# Patient Record
Sex: FEMALE | Race: WHITE | NOT HISPANIC OR LATINO | Employment: OTHER | URBAN - METROPOLITAN AREA
[De-identification: names, ages, dates, MRNs, and addresses within clinical notes are randomized per-mention and may not be internally consistent; named-entity substitution may affect disease eponyms.]

---

## 2017-05-18 ENCOUNTER — OFFICE VISIT (OUTPATIENT)
Dept: MEDICAL GROUP | Facility: MEDICAL CENTER | Age: 52
End: 2017-05-18
Payer: COMMERCIAL

## 2017-05-18 VITALS
WEIGHT: 120 LBS | DIASTOLIC BLOOD PRESSURE: 62 MMHG | TEMPERATURE: 98.4 F | OXYGEN SATURATION: 98 % | SYSTOLIC BLOOD PRESSURE: 110 MMHG | HEIGHT: 62 IN | BODY MASS INDEX: 22.08 KG/M2 | HEART RATE: 68 BPM

## 2017-05-18 DIAGNOSIS — Z13.220 LIPID SCREENING: ICD-10-CM

## 2017-05-18 DIAGNOSIS — Z78.0 POSTMENOPAUSAL STATUS: ICD-10-CM

## 2017-05-18 DIAGNOSIS — N32.81 OVERACTIVE BLADDER: ICD-10-CM

## 2017-05-18 DIAGNOSIS — F43.21 GRIEF: ICD-10-CM

## 2017-05-18 DIAGNOSIS — K51.80 OTHER ULCERATIVE COLITIS WITHOUT COMPLICATION (HCC): ICD-10-CM

## 2017-05-18 DIAGNOSIS — E03.9 ACQUIRED HYPOTHYROIDISM: ICD-10-CM

## 2017-05-18 PROBLEM — K51.90 ULCERATIVE COLITIS WITHOUT COMPLICATIONS (HCC): Status: ACTIVE | Noted: 2017-05-18

## 2017-05-18 PROCEDURE — 99204 OFFICE O/P NEW MOD 45 MIN: CPT | Performed by: NURSE PRACTITIONER

## 2017-05-18 RX ORDER — DOCUSATE SODIUM 250 MG
250 CAPSULE ORAL DAILY
COMMUNITY
End: 2019-07-10

## 2017-05-18 RX ORDER — AZATHIOPRINE 50 MG/1
50-100 TABLET ORAL 2 TIMES DAILY
COMMUNITY

## 2017-05-18 RX ORDER — OXYBUTYNIN CHLORIDE 5 MG/1
5 TABLET ORAL 3 TIMES DAILY
COMMUNITY
End: 2017-09-12 | Stop reason: SDUPTHER

## 2017-05-18 RX ORDER — OXYBUTYNIN CHLORIDE 5 MG/1
5 TABLET ORAL 3 TIMES DAILY
Qty: 90 TAB | Refills: 1 | Status: SHIPPED | OUTPATIENT
Start: 2017-05-18 | End: 2018-05-15 | Stop reason: SDUPTHER

## 2017-05-18 RX ORDER — DOCUSATE SODIUM 250 MG
250 CAPSULE ORAL DAILY
Qty: 30 CAP | Refills: 2 | Status: SHIPPED | OUTPATIENT
Start: 2017-05-18 | End: 2019-07-10

## 2017-05-18 RX ORDER — LEVOTHYROXINE SODIUM 88 UG/1
88 TABLET ORAL
COMMUNITY
End: 2017-09-12 | Stop reason: SDUPTHER

## 2017-05-18 ASSESSMENT — PATIENT HEALTH QUESTIONNAIRE - PHQ9: CLINICAL INTERPRETATION OF PHQ2 SCORE: 0

## 2017-05-18 NOTE — MR AVS SNAPSHOT
"        Mira Ramirez   2017 1:00 PM   Office Visit   MRN: [id number]    Department:  [facility]   Dept Phone:  [phone]    Description:  Female : 1965   Provider:  VANNA Mcintosh           Reason for Visit     Establish Care           Allergies as of 2017     Allergen Noted Reactions    Mesalamine 2017   Shortness of Breath    Sulfa Drugs 2017   Shortness of Breath    Sulfasalazine 2017   Shortness of Breath      You were diagnosed with     Grief   [626992]       Acquired hypothyroidism   [5105499]       Other ulcerative colitis without complication   [1842270]       Overactive bladder   [029336]       Postmenopausal status   [993420]       Lipid screening   [327509]         Vital Signs     Blood Pressure Pulse Temperature Height Weight Body Mass Index    110/62 mmHg 68 36.9 °C (98.4 °F) 1.575 m (5' 2\") 54.432 kg (120 lb) 21.94 kg/m2    Oxygen Saturation Smoking Status                98% Never Smoker           Basic Information     Date Of Birth Sex Race Ethnicity Preferred Language    1965 Female White Non- English      Problem List              ICD-10-CM Priority Class Noted - Resolved    Grief F43.20   2017 - Present    Acquired hypothyroidism E03.9   2017 - Present    Ulcerative colitis without complications (CMS-[facility]) K51.90   2017 - Present    Postmenopausal status Z78.0   2017 - Present    Overactive bladder N32.81   2017 - Present      Health Maintenance     Patient has no pending health maintenance at this time      Current Immunizations     No immunizations on file.      Below and/or attached are the medications your provider expects you to take. Review all of your home medications and newly ordered medications with your provider and/or pharmacist. Follow medication instructions as directed by your provider and/or pharmacist. Please keep your medication list with you and share with your provider. Update the " information when medications are discontinued, doses are changed, or new medications (including over-the-counter products) are added; and carry medication information at all times in the event of emergency situations     Allergies:  MESALAMINE - Shortness of Breath     SULFA DRUGS - Shortness of Breath     SULFASALAZINE - Shortness of Breath               Medications  Valid as of: May 18, 2017 -  3:02 PM    Generic Name Brand Name Tablet Size Instructions for use    AzaTHIOprine (Tab) IMURAN 50 MG Take 50 mg by mouth every day.        Calcium Citrate-Vitamin D (Tab) CITRACAL+D 315-200 MG-UNIT Take 1 Tab by mouth every day.        Docusate Sodium (Cap) COLACE 250 MG Take 250 mg by mouth every day.        Docusate Sodium (Cap) COLACE 250 MG Take 1 Cap by mouth every day.        Levothyroxine Sodium (Tab) SYNTHROID 88 MCG Take 88 mcg by mouth Every morning on an empty stomach.        Oxybutynin Chloride (Tab) DITROPAN 5 MG Take 5 mg by mouth 3 times a day.        Oxybutynin Chloride (Tab) DITROPAN 5 MG Take 1 Tab by mouth 3 times a day.        Pediatric Multivitamins-Iron (Solution) VI-CINDY/FE 10 MG/ML Take 1 mL by mouth every day.        .                 Medicines prescribed today were sent to:     [id number] - [place], [place] - [address] [address] [address]    Phone: [phone] Fax: [phone]    Open 24 Hours?: No      Medication refill instructions:       If your prescription bottle indicates you have medication refills left, it is not necessary to call your provider’s office. Please contact your pharmacy and they will refill your medication.    If your prescription bottle indicates you do not have any refills left, you may request refills at any time through one of the following ways: The online [employer] system (except Urgent Care), by calling your provider’s office, or by asking your pharmacy to contact your provider’s office with a refill request. Medication refills are  processed only during regular business hours and may not be available until the next business day. Your provider may request additional information or to have a follow-up visit with you prior to refilling your medication.   *Please Note: Medication refills are assigned a new Rx number when refilled electronically. Your pharmacy may indicate that no refills were authorized even though a new prescription for the same medication is available at the pharmacy. Please request the medicine by name with the pharmacy before contacting your provider for a refill.        Your To Do List     Future Labs/Procedures Complete By Expires    CBC WITH DIFFERENTIAL  As directed 5/19/2018    COMP METABOLIC PANEL  As directed 5/19/2018    FREE THYROXINE  As directed 5/19/2018    LIPID PROFILE  As directed 5/19/2018    TSH  As directed 5/19/2018      Referral     A referral request has been sent to our patient care coordination department. Please allow 3-5 business days for us to process this request and contact you either by phone or mail. If you do not hear from us by the 5th business day, please call us at [phone].           [employer] Access Code: [email or web address]  Expires: 6/17/2017 12:35 PM    [employer]  A secure, online tool to manage your health information     [employer]’s [employer]® is a secure, online tool that connects you to your personalized health information from the privacy of your home -- day or night - making it very easy for you to manage your healthcare. Once the activation process is completed, you can even access your medical information using the [employer] luiz, which is available for free in the [employer] Luiz store or Google Play store.     [employer] provides the following levels of access (as shown below):   My Chart Features   [facility] Doctor [facility]  Specialists [facility]  Urgent  Care Non-[facility]  Primary Care  Doctor   Email your healthcare team securely and privately 24/7 X X X    Manage appointments:  schedule your next appointment; view details of past/upcoming appointments X      Request prescription refills. X      View recent personal medical records, including lab and immunizations X X X X   View health record, including health history, allergies, medications X X X X   Read reports about your outpatient visits, procedures, consult and ER notes X X X X   See your discharge summary, which is a recap of your hospital and/or ER visit that includes your diagnosis, lab results, and care plan. X X       How to register for [employer]:  1. Go to  [email or web address].  2. Click on the Sign Up Now box, which takes you to the New Member Sign Up page. You will need to provide the following information:  a. Enter your [employer] Access Code exactly as it appears at the top of this page. (You will not need to use this code after you’ve completed the sign-up process. If you do not sign up before the expiration date, you must request a new code.)   b. Enter your date of birth.   c. Enter your home email address.   d. Click Submit, and follow the next screen’s instructions.  3. Create a [employer] ID. This will be your [employer] login ID and cannot be changed, so think of one that is secure and easy to remember.  4. Create a [employer] password. You can change your password at any time.  5. Enter your Password Reset Question and Answer. This can be used at a later time if you forget your password.   6. Enter your e-mail address. This allows you to receive e-mail notifications when new information is available in [employer].  7. Click Sign Up. You can now view your health information.    For assistance activating your [employer] account, call [phone]

## 2017-05-18 NOTE — ASSESSMENT & PLAN NOTE
passed from [place] about a year ago  Interested in counseling and requesting referral  Denies depression

## 2017-05-18 NOTE — Clinical Note
[facility]  SAMEERA Mcintosh.  [address]  [place] [address]  Fax: [phone]   Authorization for Release/Disclosure of   Protected Health Information   Name: MIRA MULLINS : 1965 SSN: [id number]   Address:  [address] [place] [address] Phone:    [phone] (home)    I authorize the entity listed below to release/disclose the PHI below to:   [facility]/VANNA Mcintosh and VANNA Mcintosh   Provider or Entity Name:  Mae   Address   City, [facility] [place]   Phone:      Fax:     Reason for request: continuity of care   Information to be released:    [ x ] LAST COLONOSCOPY,  including any PATH REPORT and follow-up  [  ] LAST FIT/COLOGUARD RESULT [  ] LAST DEXA  [ x ] LAST MAMMOGRAM  [  ] LAST PAP  [  ] LAST LABS [  ] RETINA EXAM REPORT  [  ] IMMUNIZATION RECORDS  [ x ] Release all info      [  ] Check here and initial the line next to each item to release ALL health information INCLUDING  _____ Care and treatment for drug and / or alcohol abuse  _____ HIV testing, infection status, or AIDS  _____ Genetic Testing    DATES OF SERVICE OR TIME PERIOD TO BE DISCLOSED: _____________  I understand and acknowledge that:  * This Authorization may be revoked at any time by you in writing, except if your health information has already been used or disclosed.  * Your health information that will be used or disclosed as a result of you signing this authorization could be re-disclosed by the recipient. If this occurs, your re-disclosed health information may no longer be protected by State or Federal laws.  * You may refuse to sign this Authorization. Your refusal will not affect your ability to obtain treatment.  * This Authorization becomes effective upon signing and will  on (date) __________.      If no date is indicated, this Authorization will  one (1) year from the signature date.    Name: Mira Mullins    Signature:   Date:          5/18/2017       PLEASE FAX REQUESTED RECORDS BACK TO: [phone]

## 2017-05-18 NOTE — Clinical Note
5/18/2017    Subject: Action Required to Complete [employer] Activation    Dear Mira Ramirez,    Thank you for enrolling in [employer], a free online tool where you can schedule appointments, request prescription refills, view test results and more. To complete your [employer] activation, please follow the instructions below.     1. Visit [employer]     2. Click “Sign Up Now”    3. Enter activation code: [email or web address]  Expires: 6/17/2017 12:35 PM    4. Follow the instructions on screen to complete the quick, 3-step activation    Once you’ve completed your activation, you’re ready to view your medical record. Please remember, [employer] is not to be used for urgent needs. For medical emergencies, dial 911.    [employer]’s secure online tool allows you to manage your health information day or night. [employer] allows you to:    • Schedule and view appointments  • View test results  • Request prescription refills  • Message your healthcare provider  • Keep track of your family’s health  • Review immunization records  • View or download your Summary of Care document    Download the [employer] Luiz   After you’ve created a login by following the steps above, you can download the [employer] luiz for your smartphone for even quicker access. Simply visit the luiz store and search “Finding [employer].”    For questions or assistance with [employer], please call Customer Service at [phone].    Sincerely,  Customer Service Team

## 2017-05-18 NOTE — PROGRESS NOTES
Chief Complaint   Patient presents with   • Establish Care     Mira Ramirez is a 51 y.o. female here to [facility]. She has recently relocated from [facility]. She has a daughter in college and son in high school, she recently lost her . She is working at [employer]. We discussed:    Grief   passed from [place] about a year ago  Interested in counseling and requesting referral  Denies depression    Acquired hypothyroidism  Managed with levothyroxine 88 mcg daily  Weight stable, no fatigue, no constipation    Ulcerative colitis without complications (CMS-HCC)  Doing well with imuran  Needs local gastroenterologist  No recent abd pain, no blood in stool    Postmenopausal status  On CA and Vi D supplementation  Unsure if she has had bone density    Overactive bladder  Controlled with oxybutynin    Last mammogram was November 2016. States that she is up-to-date on Pap smear as well  Current medicines (including changes today)  Current Outpatient Prescriptions   Medication Sig Dispense Refill   • poly vits with iron (VI-CINDY/FE) 10 MG/ML Solution Take 1 mL by mouth every day.     • calcium citrate 315 mg-vitamin D 200 mg (CALCIUM CITRATE +) 315-200 MG-UNIT per tablet Take 1 Tab by mouth every day.     • docusate sodium (COLACE) 250 MG capsule Take 250 mg by mouth every day.     • azathioprine (IMURAN) 50 MG Tab Take 50 mg by mouth every day.     • levothyroxine (SYNTHROID) 88 MCG Tab Take 88 mcg by mouth Every morning on an empty stomach.     • oxybutynin (DITROPAN) 5 MG Tab Take 5 mg by mouth 3 times a day.     • oxybutynin (DITROPAN) 5 MG Tab Take 1 Tab by mouth 3 times a day. 90 Tab 1   • docusate sodium (RA COL-RITE) 250 MG capsule Take 1 Cap by mouth every day. 30 Cap 2     No current facility-administered medications for this visit.     She  has no past medical history on file.  She  has past surgical history that includes eye surgery.  Social History   Substance Use Topics   • Smoking  "status: Not on file   • Smokeless tobacco: Not on file   • Alcohol Use: Not on file     Social History     Social History Narrative   • No narrative on file     No family history on file.  No family status information on file.         ROS  Problems listed discussed above, all other systems reviewed and negative     Objective:     Blood pressure 110/62, pulse 68, temperature 36.9 °C (98.4 °F), height 1.575 m (5' 2\"), weight 54.432 kg (120 lb), SpO2 98 %. Body mass index is 21.94 kg/(m^2).  Physical Exam:  General: Alert, oriented in no acute distress.  Eye contact is good, speech is normal, affect calm  HEENT: Right ptosis, perrl, Oral mucosa pink moist, no lesions. Nares patent. TMs gray with good landmarks bilaterally. No cervical or supraclavicular lymphadenopathy, no thyromegaly  Lungs: clear to auscultation bilaterally, good aeration, normal effort. No wheeze/ rhonchi/ rales.  CV: regular rate and rhythm, S1, S2. No murmur, no edema. Pedal pulses 2 + bilaterally  Abdomen: soft, nontender, BS x4, no hepatosplenomegaly.  Ext: color normal, vascularity normal, temperature normal. No rash or lesions.  MS: no point tenderness over spine, no obvious deformity. No joint swelling or redness. Strength is 5/5 globally  Neuro: DTR 2+ bilaterally   Assessment and Plan:   The following treatment plan was discussed   1. Grief  Grieving from loss of , would like counseling. Denies depression  REFERRAL TO PSYCHOLOGY   2. Acquired hypothyroidism  Clinically euthyroid. Recheck labs  COMP METABOLIC PANEL    TSH    FREE THYROXINE   3. Other ulcerative colitis without complication  Stable, refer to local specialist  REFERRAL TO GASTROENTEROLOGY    CBC WITH DIFFERENTIAL    docusate sodium (RA COL-RITE) 250 MG capsule   4. Overactive bladder  Doing well with ditropan, refill provided  oxybutynin (DITROPAN) 5 MG Tab   5. Postmenopausal status  continue calcium and vitamin D supplementation. Will review records for DEXA scan  "   6. Lipid screening  LIPID PROFILE       Educated in proper administration of medication(s) ordered today including safety, possible SE, risks, benefits, rationale and alternatives to therapy.       Records requested, will determine next due date for mammogram  Followup: Pending labs             Please note that this dictation was created using voice recognition software. I have worked with consultants from the vendor as well as technical experts from [facility] to optimize the interface. I have made every reasonable attempt to correct obvious errors, but I expect that there are errors of grammar and possibly content that I did not discover before finalizing the note.

## 2017-05-23 ENCOUNTER — HOSPITAL ENCOUNTER (OUTPATIENT)
Dept: LAB | Facility: MEDICAL CENTER | Age: 52
End: 2017-05-23
Attending: NURSE PRACTITIONER
Payer: COMMERCIAL

## 2017-05-23 ENCOUNTER — APPOINTMENT (OUTPATIENT)
Dept: BEHAVIORAL HEALTH | Facility: PHYSICIAN GROUP | Age: 52
End: 2017-05-23
Payer: COMMERCIAL

## 2017-05-23 ENCOUNTER — OFFICE VISIT (OUTPATIENT)
Dept: BEHAVIORAL HEALTH | Facility: PHYSICIAN GROUP | Age: 52
End: 2017-05-23
Payer: COMMERCIAL

## 2017-05-23 DIAGNOSIS — F43.23 ADJUSTMENT DISORDER WITH MIXED ANXIETY AND DEPRESSED MOOD: ICD-10-CM

## 2017-05-23 DIAGNOSIS — Z13.220 LIPID SCREENING: ICD-10-CM

## 2017-05-23 DIAGNOSIS — K51.80 OTHER ULCERATIVE COLITIS WITHOUT COMPLICATION (HCC): ICD-10-CM

## 2017-05-23 DIAGNOSIS — E03.9 ACQUIRED HYPOTHYROIDISM: ICD-10-CM

## 2017-05-23 LAB
ALBUMIN SERPL BCP-MCNC: 4.3 G/DL (ref 3.2–4.9)
ALBUMIN/GLOB SERPL: 1.3 G/DL
ALP SERPL-CCNC: 58 U/L (ref 30–99)
ALT SERPL-CCNC: 8 U/L (ref 2–50)
ANION GAP SERPL CALC-SCNC: 7 MMOL/L (ref 0–11.9)
AST SERPL-CCNC: 18 U/L (ref 12–45)
BASOPHILS # BLD AUTO: 1.1 % (ref 0–1.8)
BASOPHILS # BLD: 0.06 K/UL (ref 0–0.12)
BILIRUB SERPL-MCNC: 1.9 MG/DL (ref 0.1–1.5)
BUN SERPL-MCNC: 19 MG/DL (ref 8–22)
CALCIUM SERPL-MCNC: 10 MG/DL (ref 8.5–10.5)
CHLORIDE SERPL-SCNC: 104 MMOL/L (ref 96–112)
CHOLEST SERPL-MCNC: 201 MG/DL (ref 100–199)
CO2 SERPL-SCNC: 27 MMOL/L (ref 20–33)
CREAT SERPL-MCNC: 0.57 MG/DL (ref 0.5–1.4)
EOSINOPHIL # BLD AUTO: 0.1 K/UL (ref 0–0.51)
EOSINOPHIL NFR BLD: 1.9 % (ref 0–6.9)
ERYTHROCYTE [DISTWIDTH] IN BLOOD BY AUTOMATED COUNT: 44.5 FL (ref 35.9–50)
GFR SERPL CREATININE-BSD FRML MDRD: >60 ML/MIN/1.73 M 2
GLOBULIN SER CALC-MCNC: 3.2 G/DL (ref 1.9–3.5)
GLUCOSE SERPL-MCNC: 84 MG/DL (ref 65–99)
HCT VFR BLD AUTO: 41.7 % (ref 37–47)
HDLC SERPL-MCNC: 70 MG/DL
HGB BLD-MCNC: 13.4 G/DL (ref 12–16)
IMM GRANULOCYTES # BLD AUTO: 0.01 K/UL (ref 0–0.11)
IMM GRANULOCYTES NFR BLD AUTO: 0.2 % (ref 0–0.9)
LDLC SERPL CALC-MCNC: 116 MG/DL
LYMPHOCYTES # BLD AUTO: 1.68 K/UL (ref 1–4.8)
LYMPHOCYTES NFR BLD: 31.6 % (ref 22–41)
MCH RBC QN AUTO: 30.5 PG (ref 27–33)
MCHC RBC AUTO-ENTMCNC: 32.1 G/DL (ref 33.6–35)
MCV RBC AUTO: 94.8 FL (ref 81.4–97.8)
MONOCYTES # BLD AUTO: 0.48 K/UL (ref 0–0.85)
MONOCYTES NFR BLD AUTO: 9 % (ref 0–13.4)
NEUTROPHILS # BLD AUTO: 2.98 K/UL (ref 2–7.15)
NEUTROPHILS NFR BLD: 56.2 % (ref 44–72)
NRBC # BLD AUTO: 0 K/UL
NRBC BLD AUTO-RTO: 0 /100 WBC
PLATELET # BLD AUTO: 368 K/UL (ref 164–446)
PMV BLD AUTO: 9.5 FL (ref 9–12.9)
POTASSIUM SERPL-SCNC: 4.3 MMOL/L (ref 3.6–5.5)
PROT SERPL-MCNC: 7.5 G/DL (ref 6–8.2)
RBC # BLD AUTO: 4.4 M/UL (ref 4.2–5.4)
SODIUM SERPL-SCNC: 138 MMOL/L (ref 135–145)
T4 FREE SERPL-MCNC: 1.22 NG/DL (ref 0.53–1.43)
TRIGL SERPL-MCNC: 74 MG/DL (ref 0–149)
TSH SERPL DL<=0.005 MIU/L-ACNC: 0.62 UIU/ML (ref 0.3–3.7)
WBC # BLD AUTO: 5.3 K/UL (ref 4.8–10.8)

## 2017-05-23 PROCEDURE — 90791 PSYCH DIAGNOSTIC EVALUATION: CPT | Performed by: MARRIAGE & FAMILY THERAPIST

## 2017-05-23 PROCEDURE — 80053 COMPREHEN METABOLIC PANEL: CPT

## 2017-05-23 PROCEDURE — 80061 LIPID PANEL: CPT

## 2017-05-23 PROCEDURE — 84439 ASSAY OF FREE THYROXINE: CPT

## 2017-05-23 PROCEDURE — 85025 COMPLETE CBC W/AUTO DIFF WBC: CPT

## 2017-05-23 PROCEDURE — 84443 ASSAY THYROID STIM HORMONE: CPT

## 2017-05-23 PROCEDURE — 36415 COLL VENOUS BLD VENIPUNCTURE: CPT

## 2017-05-23 NOTE — BH THERAPY
[facility]  INITIAL ASSESSMENT    Name: Mira Ramirez  MRN: [id number]  : 1965  Age: 51 y.o.  Date of assessment: 2017  PCP: SAMEERA Mcintosh.  Persons in attendance: Patient  Total session time: 45 minutes      CHIEF COMPLAINT AND HISTORY OF PRESENTING PROBLEM:  (as stated by Patient):  Mira Ramirez is a 51 y.o., White female referred for assessment by No ref. provider found.  Primary presenting issue includes   Chief Complaint   Patient presents with   • Anxiety     grief   .    FAMILY/SOCIAL HISTORY  Current living situation/household members: lives w/ 18 yo son Philipp, moved here in Dec 2016 after carlton  2016 of cancer  Relevant family history/structure/dynamics: carlton of 28 yrs was ill for 2 yrs of throat cancer and  in [place], son took care of him; pt says they were a close, loving family; scott, 20, got counseling after his death and it helped but Philipp refused till now and won't come w/out mom; pt moved here for a new start and to get away from memories  Current family/social stressors: grief; also Philipp has been having trouble in school, skipping when he feels like it; went to [employer], the pot thing in [place] w/out telling her; says he has good grades (goes to [employer]) but doesn't seem to care  Quality/quantity of current family and/or social support: her family, her fa is here  Does patient/parent report a family history of behavioral health issues, diagnoses, or treatment? No  No family history on file.     BEHAVIORAL HEALTH TREATMENT HISTORY  Does patient/parent report a history of prior behavioral health treatment for patient? No:    History of untreated behavioral health issues identified? No    MEDICAL HISTORY  Primary care behavioral health screenings: Patient Health Questionaire                                     If depressive symptoms identified deferred to follow up visit unless specifically addressed in assesment and  plan.    Interpretation of PHQ-9 Total Score   Score Severity   1-4 Minimal Depression   5-9 Mild Depression   10-14 Moderate Depression   15-19 Moderately Severe Depression   20-27 Severe Depression     Past medical/surgical history:   No past medical history on file.   Past Surgical History   Procedure Laterality Date   • Eye surgery          Medication Allergies:  Mesalamine; Sulfa drugs; and Sulfasalazine     Patient reports last physical exam: May 2017   Does patient/parent report any history of or current developmental concerns? No  Does patient/parent report nutritional concerns? No  Does patient/parent report change in appetite or weight loss/gain? No  Does patient/parent report history of eating disorder symptoms? No  Does patient/parent report dental problem? No  Does patient/parent report physical pain? No   Indicate if pain is acute or chronic, and location: na   Pain scale rating:       Does patient/parent report functional impact of medical, developmental, or pain issues?   no    EDUCATIONAL  Is patient currently enrolled in a school/educational program?   No:   Highest grade level completed: AA  School performance/functioning: ok  History of Special Education/repeated grades/learning issues: no  Preferred learning style: doing  Current learning needs (large print, language barrier, etc):  no    EMPLOYMENT/RESOURCES  Is the patient currently employed? Yes [employer] business for 29 yrs; [place] [employer]  Does the patient/parent report adequate financial resources? Yes  Does patient identify impact of presenting issue on work functioning? No  Work or income-related stressors:  no     HISTORY:  Does patient report current or past enlistment? No    [If yes, trigger below section]  Does patient report history of exposure to combat? No  Does patient report history of  sexual trauma? No  Does patient report other -related stressors? No    SPIRITUAL/CULTURAL/IDENTITY:  What are the  patient’s/family’s spiritual beliefs or practices? Pentecostalism  What is the patient’s cultural or ethnic background/identity? cauc  How does the patient identify their sexual orientation? het  How does the patient identify their gender? female  Does the patient identify any spiritual/cultural/identity factors as relevant to the presenting issue? No    LEGAL HISTORY  Has the patient ever been involved with juvenile, adult, or family legal systems? No   [If yes, trigger section below:]  Does patient report ever being a victim of a crime?  No  Does patient report involvement in any current legal issues?  No  Does patient report ever being arrested or committing a crime? No  Does patient report any current agency (parole/probation/CPS/) involvement? No    ABUSE/NEGLECT/TRAUMA SCREENING  Does patient report feeling “unsafe” in his/her home, or afraid of anyone? No  Does patient report any history of physical, sexual, or emotional abuse? No  Does parent or significant other report any of the above? na  Is there evidence of neglect by self? No  Is there evidence of neglect by a caregiver? No  Does the patient/parent report any history of CPS/APS/police involvement related to suspected abuse/neglect or domestic violence? No  Does the patient/parent report any other history of potentially traumatic life events? No  Based on the information provided during the current assessment, is a mandated report of suspected abuse/neglect being made?  No     SAFETY ASSESSMENT - SELF  Does patient acknowledge current or past symptoms of dangerousness to self? No  Does parent/significant other report patient has current or past symptoms of dangerousness to self? na      Recent change in frequency/specificity/intensity of suicidal thoughts or self-harm behavior? No  Current access to firearms, medications, or other identified means of suicide/self-harm? na  If yes, willing to restrict access to means of suicide/self-harm?  na  Protective factors present: Fear of suicide, Moral objection to suicide, Spiritual beliefs/practices and Strong family connections    Current Suicide Risk: Low  Crisis Safety Plan completed and copy given to patient: No    SAFETY ASSESSMENT - OTHERS  Does paor past symptoms of aggressive behavior or risk to others? No  Does parent/significant othtient acknowledge current or past symptoms of aggressive behavior or risk to others? No  Does parent/significant other report patient has current or past symptoms of aggressive behavior or risk to others? na    Recent change in frequency/specificity/intensity of thoughts or threats to harm others? No  Current access to firearms/other identified means of harm? No  If yes, willing to restrict access to weapons/means of harm? na  Protective factors present: Moral/spiritual prohibition, Well-developed sense of empathy, Positive impulse-control, Stable relationships and Stable employment    Current Homicide Risk:  Not applicable  Crisis Safety Plan completed and copy given to patient? No  Based on information provided during the current assessment, is a mandated “duty to warn” being exercised? No    SUBSTANCE USE/ADDICTION HISTORY  [] Not applicable - patient 10 years of age or younger    Is there a family history of substance use/addiction? No  Does patient acknowledge or parent/significant other report use of/dependence on substances? No  Last time patient used alcohol: mos  Within the past week? No  Last time patient used marijuana: never  Within the past month? No  Any other street drugs ever tried even once? No  Any use of prescription medications/pills without a prescription, or for reasons others than originally prescribed?  No  Any other addictive behavior reported (gambling, shopping, sex)? No     Drug History:  Amphetamine:      Cannibis:      Cocaine:      Ecstasy:      Hallucinogen:      Inhalant:       Opiate:      Other:      Sedative:           What  "consequences does the patient associate with any of the above substance use and or addictive behaviors? None    Patient’s motivation/readiness for change: here for help w/ son's grief    [] Patient denies use of any substance/addictive behaviors    STRENGTHS/ASSETS  Strengths Identified by interviewer: Stable relationships and Cognitive flexibility  Strengths Identified by patient: willingness    MENTAL STATUS/OBSERVATIONS   Participation: Active verbal participation and Open to feedback  Grooming: Casual and Neat  Orientation:Alert   Behavior: Agitated  Eye contact: Good   Mood:Anxious  Affect:Sad, Anxious and Tearful  Thought process: Logical  Thought content:  Within normal limits  Speech: Pressured and Rapid  Perception: Within normal limits  Memory: No gross evidence of memory deficits  Insight: Good  Judgment:  Good  Other:    Family/couple interaction observations: na    RESULTS OF SCREENING MEASURES:  [] Not applicable  Measure:   Score:     Measure:   Score:       CLINICAL FORMULATION: 50 yo Mira lost her husb last year to cancer, then moved here w/ son to start again; has concerns for him; he took care of his fa the last year; has been having trouble w/ school since the move, even tho he gets good grades; she says she's \"fine\", just mostly concerned about Philipp; scott will be going to college in [place]; will be bringing Philipp to future appts, since she said he will only come w/ her      DIAGNOSTIC IMPRESSION(S):  1. Adjustment disorder with mixed anxiety and depressed mood          IDENTIFIED NEEDS/PLAN:  [If any of these marked, trigger DISPOSITION list]  Mood/anxiety and Other: grief  Actively being addressed by [facility]    Does patient express agreement with the above plan? Yes     Referral appointment(s) scheduled? w/ leonardo therapist for pt and son     End time of session: 1:45    DES Coto.        "

## 2017-05-24 ENCOUNTER — TELEPHONE (OUTPATIENT)
Dept: MEDICAL GROUP | Facility: MEDICAL CENTER | Age: 52
End: 2017-05-24

## 2017-05-24 NOTE — TELEPHONE ENCOUNTER
"----- Message from VANNA Mcintosh sent at 5/24/2017  9:51 AM PDT -----  Please inform patient:  Metabolic panel and blood counts are generally normal. Total cholesterol is 201, normal being under 199. LDL or \"bad cholesterol\" is 116, this is slightly above normal which is less than 100. However, her HDL or \"good cholesterol\" is quite high which balances risk. Thyroid levels in normal range.  Continue with current medications  "

## 2017-06-20 ENCOUNTER — OFFICE VISIT (OUTPATIENT)
Dept: BEHAVIORAL HEALTH | Facility: PHYSICIAN GROUP | Age: 52
End: 2017-06-20
Payer: COMMERCIAL

## 2017-06-20 DIAGNOSIS — F43.23 ADJUSTMENT DISORDER WITH MIXED ANXIETY AND DEPRESSED MOOD: ICD-10-CM

## 2017-06-20 PROCEDURE — 90834 PSYTX W PT 45 MINUTES: CPT | Performed by: MARRIAGE & FAMILY THERAPIST

## 2017-06-20 NOTE — BH THERAPY
[facility]  Therapy Progress Note    Patient Name: Mira Ramirez  Patient MRN: [id number]  Today's Date: 6/20/2017     Type of session: family therapy   Length of session: 45 minutes  Persons in attendance:Patient and Children son Philipp    Subjective/New Info: pt wanted him to talk about his father, but he won't; both complained about fighting and she said he won't do what he's supposed to; he failed 3 classes and now has to go to school longer days, he insists he will graduate, she fears he won't; he got a ticket and she had to do a lot of work to get it handled; she's upset because he won't do chores or tell her his work schedule; he really doesn't see the reason for it; he says they both have hot tempers    Objective/Observations:   Participation: Active verbal participation   Grooming: Casual and Neat   Cognition: Alert   Eye contact: Good   Mood: Anxious and Angry   Affect: Flexible   Thought process: Logical   Speech: Rapid   Other:     Diagnoses:   1. Adjustment disorder with mixed anxiety and depressed mood         Current risk:   SUICIDE: Low   Homicide: Not applicable   Self-harm: Low   Relapse: Not applicable   Other:    Safety Plan reviewed? Not Indicated   If evidence of imminent risk is present, intervention/plan:     Therapeutic Intervention(s): Communication skills, Conflict clarification, Conflict resolution skills, Limit-setting, Parenting/familial roles addressed and Stressors assessed    Treatment Goal(s)/Objective(s) addressed: disc slowing down and talking one at a time; also for Philipp to do his chores at first so mom doesn't get on him so much     Progress toward Treatment Goals: No change    Plan:  - Next appointment scheduled:  7/5/2017    LUCIANA Coto  6/20/2017

## 2017-07-05 ENCOUNTER — APPOINTMENT (OUTPATIENT)
Dept: BEHAVIORAL HEALTH | Facility: PHYSICIAN GROUP | Age: 52
End: 2017-07-05

## 2017-07-19 ENCOUNTER — OFFICE VISIT (OUTPATIENT)
Dept: BEHAVIORAL HEALTH | Facility: PHYSICIAN GROUP | Age: 52
End: 2017-07-19
Payer: COMMERCIAL

## 2017-07-19 DIAGNOSIS — F43.23 ADJUSTMENT DISORDER WITH MIXED ANXIETY AND DEPRESSED MOOD: ICD-10-CM

## 2017-07-19 PROCEDURE — 90834 PSYTX W PT 45 MINUTES: CPT | Performed by: MARRIAGE & FAMILY THERAPIST

## 2017-07-19 NOTE — BH THERAPY
[facility]  Therapy Progress Note    Patient Name: Mira Ramirez  Patient MRN: [id number]  Today's Date: 7/19/2017     Type of session:Family therapy  Length of session: 45 minutes  Persons in attendance:Patient and Children son Philipp    Subjective/New Info: Philipp went to cancer camp but took vape paraphenalia and got caught, pt had to bring him home the next day; he says he accepts the consequences but is angry because she told counselors to throw stuff away; does agree he should never have taken it; mom got angry, so did he and he punched out the sun roof; she made him stay w/ his friend till this appt; both of them get very angry, yell, and don't listen; he said he wants to go home and she agreed if he doesn't have friends there; he's not allowed to drive because he got another ticket    Objective/Observations:   Participation: Active verbal participation   Grooming: Casual   Cognition: Alert   Eye contact: Good   Mood: Anxious and Angry   Affect: Angry   Thought process: Logical   Speech: Rate within normal limits   Other:     Diagnoses:   1. Adjustment disorder with mixed anxiety and depressed mood         Current risk:   SUICIDE: Low   Homicide: Not applicable   Self-harm: Not applicable   Relapse: Not applicable   Other:    Safety Plan reviewed? Not Indicated   If evidence of imminent risk is present, intervention/plan:     Therapeutic Intervention(s): Conflict clarification, Conflict resolution skills, Limit-setting, Maladaptive behavior addressed, Parenting/familial roles addressed and Problem-solving    Treatment Goal(s)/Objective(s) addressed: both did agree that since last session things addressed have improved - Philipp has been doing chores and level of conflict has decreased for the most part; he will return home; observing house rules and both will work on communication and respect     Progress toward Treatment Goals: Mild improvement    Plan:  - Next appointment scheduled:   8/15/2017    LUCIANA Coto  7/19/2017

## 2017-09-12 ENCOUNTER — OFFICE VISIT (OUTPATIENT)
Dept: MEDICAL GROUP | Facility: MEDICAL CENTER | Age: 52
End: 2017-09-12
Payer: COMMERCIAL

## 2017-09-12 VITALS
DIASTOLIC BLOOD PRESSURE: 64 MMHG | OXYGEN SATURATION: 99 % | TEMPERATURE: 98 F | HEIGHT: 62 IN | WEIGHT: 127 LBS | SYSTOLIC BLOOD PRESSURE: 118 MMHG | BODY MASS INDEX: 23.37 KG/M2 | RESPIRATION RATE: 16 BRPM | HEART RATE: 64 BPM

## 2017-09-12 DIAGNOSIS — N90.89 SKIN TAG OF LABIA: ICD-10-CM

## 2017-09-12 DIAGNOSIS — R04.0 EPISTAXIS: ICD-10-CM

## 2017-09-12 DIAGNOSIS — L84 CORN OF FOOT: ICD-10-CM

## 2017-09-12 DIAGNOSIS — Z23 NEED FOR INFLUENZA VACCINATION: ICD-10-CM

## 2017-09-12 DIAGNOSIS — N32.81 OVERACTIVE BLADDER: ICD-10-CM

## 2017-09-12 DIAGNOSIS — E03.9 ACQUIRED HYPOTHYROIDISM: ICD-10-CM

## 2017-09-12 PROCEDURE — 99214 OFFICE O/P EST MOD 30 MIN: CPT | Mod: 25 | Performed by: NURSE PRACTITIONER

## 2017-09-12 PROCEDURE — 99999 PR NO CHARGE: CPT | Performed by: NURSE PRACTITIONER

## 2017-09-12 PROCEDURE — 90686 IIV4 VACC NO PRSV 0.5 ML IM: CPT | Performed by: NURSE PRACTITIONER

## 2017-09-12 PROCEDURE — 90471 IMMUNIZATION ADMIN: CPT | Performed by: NURSE PRACTITIONER

## 2017-09-12 RX ORDER — LEVOTHYROXINE SODIUM 88 UG/1
88 TABLET ORAL
Qty: 30 TAB | Refills: 11 | Status: SHIPPED | OUTPATIENT
Start: 2017-09-12 | End: 2018-05-15 | Stop reason: SDUPTHER

## 2017-09-12 RX ORDER — OXYBUTYNIN CHLORIDE 5 MG/1
5 TABLET ORAL 2 TIMES DAILY
Qty: 60 TAB | Refills: 11 | Status: SHIPPED | OUTPATIENT
Start: 2017-09-12 | End: 2019-11-06 | Stop reason: SDUPTHER

## 2017-09-12 NOTE — ASSESSMENT & PLAN NOTE
She's noticed a bump on the left external labia that she would like evaluated today. It is not painful, has not been growing or changing

## 2017-09-12 NOTE — ASSESSMENT & PLAN NOTE
Doing well on oxybutynin 5 mg twice daily, no frequent urination, no urinary retention  Needing refill

## 2017-09-12 NOTE — PROGRESS NOTES
Subjective:     Chief Complaint   Patient presents with   • Foot Problem   • Epistaxis   • Lump     vaginal     Mira Blanchard is a 51 y.o. female here today to follow up on:    Corn of foot  L foot, has been present for several years  Now causing more pain    Overactive bladder  Doing well on oxybutynin 5 mg twice daily, no frequent urination, no urinary retention  Needing refill    Skin tag of labia  She's noticed a bump on the left external labia that she would like evaluated today. It is not painful, has not been growing or changing    Epistaxis  Occasional difficulty with slight bloody nose over the last few weeks  No nasal sprays  No acute congestion, sinus pressure  No bruising, fatigue      Acquired hypothyroidism  Doing well on levothyroxine, recently notes reviewed   no constipation, fatigue, hair loss      She would like influenza vaccine today       Current medicines (including changes today)  Current Outpatient Prescriptions   Medication Sig Dispense Refill   • oxybutynin (DITROPAN) 5 MG Tab Take 1 Tab by mouth 2 Times a Day. 60 Tab 11   • levothyroxine (SYNTHROID) 88 MCG Tab Take 1 Tab by mouth Every morning on an empty stomach. 30 Tab 11   • poly vits with iron (VI-CINDY/FE) 10 MG/ML Solution Take 1 mL by mouth every day.     • calcium citrate 315 mg-vitamin D 200 mg (CALCIUM CITRATE +) 315-200 MG-UNIT per tablet Take 1 Tab by mouth every day.     • docusate sodium (COLACE) 250 MG capsule Take 250 mg by mouth every day.     • azathioprine (IMURAN) 50 MG Tab Take 50 mg by mouth every day.     • oxybutynin (DITROPAN) 5 MG Tab Take 1 Tab by mouth 3 times a day. 90 Tab 1   • docusate sodium (RA COL-RITE) 250 MG capsule Take 1 Cap by mouth every day. 30 Cap 2     No current facility-administered medications for this visit.      She  has no past medical history on file.    ROS included above     Objective:     Blood pressure 118/64, pulse 64, temperature 36.7 °C (98 °F), resp. rate 16, height 1.575 m (5'  "2\"), weight 57.6 kg (127 lb), SpO2 99 %. Body mass index is 23.23 kg/m².     Physical Exam:  General: Alert, oriented in no acute distress.  Eye contact is good, speech is normal, affect calm  HEENT: Nares with dry mucosa. No ulcerations   Lungs: clear to auscultation bilaterally, normal effort, no wheeze/ rhonchi/ rales.  CV: regular rate and rhythm, S1, S2   Pelvic: Approximately 1 cm fleshy protrusion on the left external labia consistent with skin tag. External genitalia pink, moist. No lesions.   Ext: Small corn present on the left plantar foot. No edema, color normal, vascularity normal, temperature normal    Assessment and Plan:   The following treatment plan was discussed   1. Corn of foot  Has become increasingly painful, she would like to see a podiatrist about removal. Referral placed  REFERRAL TO PODIATRY   2. Overactive bladder  Doing well on oxybutynin, refill provided  oxybutynin (DITROPAN) 5 MG Tab   3. Skin tag of labia  Benign skin growth on left external labia. Notify me if growing or becoming irritated    4. Acquired hypothyroidism  Stable  levothyroxine (SYNTHROID) 88 MCG Tab   5. Epistaxis  Likely related to dry climate. Encourage nasal moisturization with Vaseline, bedside humidifier    6. Need for influenza vaccination  I have placed the below orders and discussed them with an approved delegating provider. The MA is performing the below orders under the direction of Dr. Keisha Becker Quad Inj >3 Year Pre-Filled PF       Followup: as needed         Please note that this dictation was created using voice recognition software. I have worked with consultants from the vendor as well as technical experts from [employer] to optimize the interface. I have made every reasonable attempt to correct obvious errors, but I expect that there are errors of grammar and possibly content that I did not discover before finalizing the note.       "

## 2017-09-12 NOTE — ASSESSMENT & PLAN NOTE
Occasional difficulty with slight bloody nose over the last few weeks  No nasal sprays  No acute congestion, sinus pressure  No bruising, fatigue

## 2017-09-26 ENCOUNTER — OFFICE VISIT (OUTPATIENT)
Dept: BEHAVIORAL HEALTH | Facility: PHYSICIAN GROUP | Age: 52
End: 2017-09-26
Payer: COMMERCIAL

## 2017-09-26 DIAGNOSIS — F43.23 ADJUSTMENT DISORDER WITH MIXED ANXIETY AND DEPRESSED MOOD: ICD-10-CM

## 2017-09-26 PROCEDURE — 90834 PSYTX W PT 45 MINUTES: CPT | Performed by: MARRIAGE & FAMILY THERAPIST

## 2017-09-26 NOTE — BH THERAPY
[facility]  Therapy Progress Note    Patient Name: Mira Blanchard  Patient MRN: [id number]  Today's Date: 9/26/2017     Type of session: family therapy  Length of session: 45 minutes  Persons in attendance:Patient and Children son Philipp    Subjective/New Info: both report that things had been going along well; Philipp is in online school and is catching up w/ credits; session deteriorated when they focused on past two days when he didn't do his chores and didn't put in the four hours he needs to for school; pt very concerned that he won't graduate, he wants her to get out of his school; she worries that he'll slack off; they both swear at each other and talk over the other; not responsive to prompts    Objective/Observations:   Participation: Active verbal participation   Grooming: Neat   Cognition: Alert   Eye contact: Good   Mood: Angry   Affect: Angry   Thought process: Logical   Speech: Rapid   Other:     Diagnoses:   1. Adjustment disorder with mixed anxiety and depressed mood         Current risk:   SUICIDE: Low   Homicide: Not applicable   Self-harm: Not applicable   Relapse: Not applicable   Other:    Safety Plan reviewed? Not Indicated   If evidence of imminent risk is present, intervention/plan:     Therapeutic Intervention(s): Communication skills, Conflict clarification, Goal-setting, Limit-setting, Parenting/familial roles addressed and Problem-solving    Treatment Goal(s)/Objective(s) addressed: both very angry and not listening to each other or to me; focused on what's not happening     Progress toward Treatment Goals: Moderate decline    Plan:  - Next appointment scheduled:  10/10/2017    LUCIANA Coto  9/26/2017

## 2017-10-10 ENCOUNTER — APPOINTMENT (OUTPATIENT)
Dept: BEHAVIORAL HEALTH | Facility: PHYSICIAN GROUP | Age: 52
End: 2017-10-10

## 2017-10-31 ENCOUNTER — HOSPITAL ENCOUNTER (OUTPATIENT)
Dept: LAB | Facility: MEDICAL CENTER | Age: 52
End: 2017-10-31
Attending: INTERNAL MEDICINE
Payer: COMMERCIAL

## 2017-10-31 LAB
ALBUMIN SERPL BCP-MCNC: 4.5 G/DL (ref 3.2–4.9)
ALBUMIN/GLOB SERPL: 1.4 G/DL
ALP SERPL-CCNC: 52 U/L (ref 30–99)
ALT SERPL-CCNC: 12 U/L (ref 2–50)
ANION GAP SERPL CALC-SCNC: 8 MMOL/L (ref 0–11.9)
AST SERPL-CCNC: 23 U/L (ref 12–45)
BASOPHILS # BLD AUTO: 1.6 % (ref 0–1.8)
BASOPHILS # BLD: 0.1 K/UL (ref 0–0.12)
BILIRUB SERPL-MCNC: 1.9 MG/DL (ref 0.1–1.5)
BUN SERPL-MCNC: 16 MG/DL (ref 8–22)
CALCIUM SERPL-MCNC: 10.1 MG/DL (ref 8.5–10.5)
CHLORIDE SERPL-SCNC: 102 MMOL/L (ref 96–112)
CO2 SERPL-SCNC: 27 MMOL/L (ref 20–33)
CREAT SERPL-MCNC: 0.55 MG/DL (ref 0.5–1.4)
EOSINOPHIL # BLD AUTO: 0.13 K/UL (ref 0–0.51)
EOSINOPHIL NFR BLD: 2.1 % (ref 0–6.9)
ERYTHROCYTE [DISTWIDTH] IN BLOOD BY AUTOMATED COUNT: 45 FL (ref 35.9–50)
GFR SERPL CREATININE-BSD FRML MDRD: >60 ML/MIN/1.73 M 2
GLOBULIN SER CALC-MCNC: 3.3 G/DL (ref 1.9–3.5)
GLUCOSE SERPL-MCNC: 91 MG/DL (ref 65–99)
HCT VFR BLD AUTO: 42.2 % (ref 37–47)
HGB BLD-MCNC: 13.5 G/DL (ref 12–16)
IMM GRANULOCYTES # BLD AUTO: 0.01 K/UL (ref 0–0.11)
IMM GRANULOCYTES NFR BLD AUTO: 0.2 % (ref 0–0.9)
LYMPHOCYTES # BLD AUTO: 1.78 K/UL (ref 1–4.8)
LYMPHOCYTES NFR BLD: 28.2 % (ref 22–41)
MCH RBC QN AUTO: 30.6 PG (ref 27–33)
MCHC RBC AUTO-ENTMCNC: 32 G/DL (ref 33.6–35)
MCV RBC AUTO: 95.7 FL (ref 81.4–97.8)
MONOCYTES # BLD AUTO: 0.48 K/UL (ref 0–0.85)
MONOCYTES NFR BLD AUTO: 7.6 % (ref 0–13.4)
NEUTROPHILS # BLD AUTO: 3.82 K/UL (ref 2–7.15)
NEUTROPHILS NFR BLD: 60.3 % (ref 44–72)
NRBC # BLD AUTO: 0 K/UL
NRBC BLD AUTO-RTO: 0 /100 WBC
PLATELET # BLD AUTO: 395 K/UL (ref 164–446)
PMV BLD AUTO: 9.5 FL (ref 9–12.9)
POTASSIUM SERPL-SCNC: 4.4 MMOL/L (ref 3.6–5.5)
PROT SERPL-MCNC: 7.8 G/DL (ref 6–8.2)
RBC # BLD AUTO: 4.41 M/UL (ref 4.2–5.4)
SODIUM SERPL-SCNC: 137 MMOL/L (ref 135–145)
WBC # BLD AUTO: 6.3 K/UL (ref 4.8–10.8)

## 2017-10-31 PROCEDURE — 36415 COLL VENOUS BLD VENIPUNCTURE: CPT

## 2017-10-31 PROCEDURE — 85025 COMPLETE CBC W/AUTO DIFF WBC: CPT

## 2017-10-31 PROCEDURE — 80053 COMPREHEN METABOLIC PANEL: CPT

## 2018-05-15 DIAGNOSIS — N32.81 OVERACTIVE BLADDER: ICD-10-CM

## 2018-05-15 DIAGNOSIS — E03.9 ACQUIRED HYPOTHYROIDISM: ICD-10-CM

## 2018-05-15 RX ORDER — LEVOTHYROXINE SODIUM 88 UG/1
88 TABLET ORAL
Qty: 90 TAB | Refills: 1 | Status: SHIPPED | OUTPATIENT
Start: 2018-05-15 | End: 2018-11-27 | Stop reason: SDUPTHER

## 2018-05-15 RX ORDER — OXYBUTYNIN CHLORIDE 5 MG/1
5 TABLET ORAL 3 TIMES DAILY
Qty: 270 TAB | Refills: 1 | Status: SHIPPED | OUTPATIENT
Start: 2018-05-15 | End: 2019-02-23 | Stop reason: SDUPTHER

## 2018-05-15 NOTE — TELEPHONE ENCOUNTER
Was the patient seen in the last year in this department? Yes     Does patient have an active prescription for medications requested? Yes     Received Request Via: Patient     Patient number: [phone]  Patient called stating she would like you to give her 2 to 3 months refill supply instead of every month. Patient states it's a hassle to have to go get medications every month. And patient is wondering if you can change that now before getting her next refill.    Levothyroxine  Oxybutynin

## 2018-05-24 ENCOUNTER — HOSPITAL ENCOUNTER (OUTPATIENT)
Facility: MEDICAL CENTER | Age: 53
End: 2018-05-24
Attending: NURSE PRACTITIONER
Payer: COMMERCIAL

## 2018-05-24 PROCEDURE — 87493 C DIFF AMPLIFIED PROBE: CPT

## 2018-05-25 LAB
C DIFF DNA SPEC QL NAA+PROBE: NEGATIVE
C DIFF TOX GENS STL QL NAA+PROBE: NEGATIVE

## 2018-06-19 ENCOUNTER — HOSPITAL ENCOUNTER (OUTPATIENT)
Dept: LAB | Facility: MEDICAL CENTER | Age: 53
End: 2018-06-19
Attending: INTERNAL MEDICINE
Payer: COMMERCIAL

## 2018-06-19 ENCOUNTER — HOSPITAL ENCOUNTER (OUTPATIENT)
Dept: LAB | Facility: MEDICAL CENTER | Age: 53
End: 2018-06-19
Attending: NURSE PRACTITIONER
Payer: COMMERCIAL

## 2018-06-19 LAB
ALBUMIN SERPL BCP-MCNC: 4.2 G/DL (ref 3.2–4.9)
ALBUMIN/GLOB SERPL: 1.5 G/DL
ALP SERPL-CCNC: 39 U/L (ref 30–99)
ALT SERPL-CCNC: 17 U/L (ref 2–50)
ANION GAP SERPL CALC-SCNC: 8 MMOL/L (ref 0–11.9)
AST SERPL-CCNC: 14 U/L (ref 12–45)
BASOPHILS # BLD AUTO: 0.1 % (ref 0–1.8)
BASOPHILS # BLD: 0.01 K/UL (ref 0–0.12)
BILIRUB SERPL-MCNC: 1.9 MG/DL (ref 0.1–1.5)
BUN SERPL-MCNC: 18 MG/DL (ref 8–22)
CALCIUM SERPL-MCNC: 9.6 MG/DL (ref 8.5–10.5)
CHLORIDE SERPL-SCNC: 105 MMOL/L (ref 96–112)
CO2 SERPL-SCNC: 26 MMOL/L (ref 20–33)
CREAT SERPL-MCNC: 0.5 MG/DL (ref 0.5–1.4)
CRP SERPL HS-MCNC: 0.03 MG/DL (ref 0–0.75)
EOSINOPHIL # BLD AUTO: 0 K/UL (ref 0–0.51)
EOSINOPHIL NFR BLD: 0 % (ref 0–6.9)
ERYTHROCYTE [DISTWIDTH] IN BLOOD BY AUTOMATED COUNT: 49.2 FL (ref 35.9–50)
GLOBULIN SER CALC-MCNC: 2.8 G/DL (ref 1.9–3.5)
GLUCOSE SERPL-MCNC: 87 MG/DL (ref 65–99)
HCT VFR BLD AUTO: 43 % (ref 37–47)
HGB BLD-MCNC: 13.9 G/DL (ref 12–16)
IMM GRANULOCYTES # BLD AUTO: 0.02 K/UL (ref 0–0.11)
IMM GRANULOCYTES NFR BLD AUTO: 0.3 % (ref 0–0.9)
LYMPHOCYTES # BLD AUTO: 0.99 K/UL (ref 1–4.8)
LYMPHOCYTES NFR BLD: 13.1 % (ref 22–41)
MCH RBC QN AUTO: 30.9 PG (ref 27–33)
MCHC RBC AUTO-ENTMCNC: 32.3 G/DL (ref 33.6–35)
MCV RBC AUTO: 95.6 FL (ref 81.4–97.8)
MONOCYTES # BLD AUTO: 0.37 K/UL (ref 0–0.85)
MONOCYTES NFR BLD AUTO: 4.9 % (ref 0–13.4)
NEUTROPHILS # BLD AUTO: 6.19 K/UL (ref 2–7.15)
NEUTROPHILS NFR BLD: 81.6 % (ref 44–72)
NRBC # BLD AUTO: 0 K/UL
NRBC BLD-RTO: 0 /100 WBC
PLATELET # BLD AUTO: 373 K/UL (ref 164–446)
PMV BLD AUTO: 9.4 FL (ref 9–12.9)
POTASSIUM SERPL-SCNC: 4.3 MMOL/L (ref 3.6–5.5)
PROT SERPL-MCNC: 7 G/DL (ref 6–8.2)
RBC # BLD AUTO: 4.5 M/UL (ref 4.2–5.4)
SODIUM SERPL-SCNC: 139 MMOL/L (ref 135–145)
WBC # BLD AUTO: 7.6 K/UL (ref 4.8–10.8)

## 2018-06-19 PROCEDURE — 86140 C-REACTIVE PROTEIN: CPT

## 2018-06-19 PROCEDURE — 80053 COMPREHEN METABOLIC PANEL: CPT

## 2018-06-19 PROCEDURE — 85025 COMPLETE CBC W/AUTO DIFF WBC: CPT

## 2018-06-19 PROCEDURE — 36415 COLL VENOUS BLD VENIPUNCTURE: CPT

## 2018-06-26 ENCOUNTER — HOSPITAL ENCOUNTER (OUTPATIENT)
Facility: MEDICAL CENTER | Age: 53
End: 2018-06-26
Attending: NURSE PRACTITIONER
Payer: COMMERCIAL

## 2018-06-26 PROCEDURE — 83993 ASSAY FOR CALPROTECTIN FECAL: CPT

## 2018-06-30 LAB — CALPROTECTIN STL-MCNT: 745 UG/G

## 2018-08-07 ENCOUNTER — HOSPITAL ENCOUNTER (OUTPATIENT)
Dept: LAB | Facility: MEDICAL CENTER | Age: 53
End: 2018-08-07
Attending: INTERNAL MEDICINE
Payer: COMMERCIAL

## 2018-08-07 PROCEDURE — 36415 COLL VENOUS BLD VENIPUNCTURE: CPT

## 2018-08-07 PROCEDURE — 85730 THROMBOPLASTIN TIME PARTIAL: CPT

## 2018-08-07 PROCEDURE — 85025 COMPLETE CBC W/AUTO DIFF WBC: CPT

## 2018-08-07 PROCEDURE — 85610 PROTHROMBIN TIME: CPT

## 2018-08-08 LAB
APTT PPP: 30.9 SEC (ref 24.7–36)
BASOPHILS # BLD AUTO: 1.3 % (ref 0–1.8)
BASOPHILS # BLD: 0.07 K/UL (ref 0–0.12)
EOSINOPHIL # BLD AUTO: 0.18 K/UL (ref 0–0.51)
EOSINOPHIL NFR BLD: 3.4 % (ref 0–6.9)
ERYTHROCYTE [DISTWIDTH] IN BLOOD BY AUTOMATED COUNT: 46.5 FL (ref 35.9–50)
HCT VFR BLD AUTO: 40.5 % (ref 37–47)
HGB BLD-MCNC: 12.7 G/DL (ref 12–16)
IMM GRANULOCYTES # BLD AUTO: 0.01 K/UL (ref 0–0.11)
IMM GRANULOCYTES NFR BLD AUTO: 0.2 % (ref 0–0.9)
INR PPP: 1.12 (ref 0.87–1.13)
LYMPHOCYTES # BLD AUTO: 1.73 K/UL (ref 1–4.8)
LYMPHOCYTES NFR BLD: 33.1 % (ref 22–41)
MCH RBC QN AUTO: 30.3 PG (ref 27–33)
MCHC RBC AUTO-ENTMCNC: 31.4 G/DL (ref 33.6–35)
MCV RBC AUTO: 96.7 FL (ref 81.4–97.8)
MONOCYTES # BLD AUTO: 0.37 K/UL (ref 0–0.85)
MONOCYTES NFR BLD AUTO: 7.1 % (ref 0–13.4)
NEUTROPHILS # BLD AUTO: 2.87 K/UL (ref 2–7.15)
NEUTROPHILS NFR BLD: 54.9 % (ref 44–72)
NRBC # BLD AUTO: 0 K/UL
NRBC BLD-RTO: 0 /100 WBC
PLATELET # BLD AUTO: 363 K/UL (ref 164–446)
PMV BLD AUTO: 9.9 FL (ref 9–12.9)
PROTHROMBIN TIME: 14.1 SEC (ref 12–14.6)
RBC # BLD AUTO: 4.19 M/UL (ref 4.2–5.4)
WBC # BLD AUTO: 5.2 K/UL (ref 4.8–10.8)

## 2018-09-19 ENCOUNTER — NON-PROVIDER VISIT (OUTPATIENT)
Dept: MEDICAL GROUP | Facility: MEDICAL CENTER | Age: 53
End: 2018-09-19
Payer: COMMERCIAL

## 2018-09-19 DIAGNOSIS — Z23 NEED FOR VACCINATION: ICD-10-CM

## 2018-09-19 PROCEDURE — 90471 IMMUNIZATION ADMIN: CPT | Performed by: NURSE PRACTITIONER

## 2018-09-19 PROCEDURE — 90686 IIV4 VACC NO PRSV 0.5 ML IM: CPT | Performed by: NURSE PRACTITIONER

## 2018-09-19 NOTE — PROGRESS NOTES
"Mira Ramirez is a 52 y.o. female here for a non-provider visit for:   FLU    Reason for immunization: Annual Flu Vaccine  Immunization records indicate need for vaccine: Yes, confirmed with Epic  Minimum interval has been met for this vaccine: Yes  ABN completed: Not Indicated    Order and dose verified by: CHARLIE BLUM  VIS Dated  08/07/2015 was given to patient: Yes  All IAC Questionnaire questions were answered \"No.\"    Patient tolerated injection and no adverse effects were observed or reported: Yes    Pt scheduled for next dose in series: No    "

## 2018-10-02 ENCOUNTER — OFFICE VISIT (OUTPATIENT)
Dept: MEDICAL GROUP | Facility: MEDICAL CENTER | Age: 53
End: 2018-10-02
Payer: COMMERCIAL

## 2018-10-02 VITALS
TEMPERATURE: 98.4 F | BODY MASS INDEX: 22.82 KG/M2 | WEIGHT: 124 LBS | SYSTOLIC BLOOD PRESSURE: 100 MMHG | OXYGEN SATURATION: 98 % | DIASTOLIC BLOOD PRESSURE: 70 MMHG | HEART RATE: 92 BPM | HEIGHT: 62 IN | RESPIRATION RATE: 16 BRPM

## 2018-10-02 DIAGNOSIS — N81.10 ACQUIRED FEMALE BLADDER PROLAPSE: ICD-10-CM

## 2018-10-02 PROCEDURE — 99213 OFFICE O/P EST LOW 20 MIN: CPT | Performed by: NURSE PRACTITIONER

## 2018-10-02 ASSESSMENT — PATIENT HEALTH QUESTIONNAIRE - PHQ9: CLINICAL INTERPRETATION OF PHQ2 SCORE: 0

## 2018-10-03 NOTE — ASSESSMENT & PLAN NOTE
"She has noted a bulge in the vaginal area for the last few months, protrude slightly when standing, states that she has \"pushed it back up\" when showering.  It has the bulge is not fully protruded from the vaginal introitus.  She has had difficulty with overactive bladder but denies leakage, difficulty emptying her bladder.  No difficulty with recurrent UTI.  No vaginal pain.  Not sexually active  "

## 2018-10-03 NOTE — PROGRESS NOTES
"Subjective:     Chief Complaint   Patient presents with   • Other     VAGINAL PROBLEM     Mira Ramirez is a 52 y.o. female here today to follow up on:    Acquired female bladder prolapse  She has noted a bulge in the vaginal area for the last few months, protrude slightly when standing, states that she has \"pushed it back up\" when showering.  It has the bulge is not fully protruded from the vaginal introitus.  She has had difficulty with overactive bladder but denies leakage, difficulty emptying her bladder.  No difficulty with recurrent UTI.  No vaginal pain.  Not sexually active       Current medicines (including changes today)  Current Outpatient Prescriptions   Medication Sig Dispense Refill   • levothyroxine (SYNTHROID) 88 MCG Tab Take 1 Tab by mouth Every morning on an empty stomach. 90 Tab 1   • oxybutynin (DITROPAN) 5 MG Tab Take 1 Tab by mouth 2 Times a Day. 60 Tab 11   • poly vits with iron (VI-CINDY/FE) 10 MG/ML Solution Take 1 mL by mouth every day.     • calcium citrate 315 mg-vitamin D 200 mg (CALCIUM CITRATE +) 315-200 MG-UNIT per tablet Take 1 Tab by mouth every day.     • docusate sodium (COLACE) 250 MG capsule Take 250 mg by mouth every day.     • azathioprine (IMURAN) 50 MG Tab Take 50 mg by mouth every day.     • docusate sodium (RA COL-RITE) 250 MG capsule Take 1 Cap by mouth every day. 30 Cap 2   • oxybutynin (DITROPAN) 5 MG Tab Take 1 Tab by mouth 3 times a day. 270 Tab 1     No current facility-administered medications for this visit.      She  has no past medical history on file.    ROS included above     Objective:     Blood pressure 100/70, pulse 92, temperature 36.9 °C (98.4 °F), temperature source Temporal, resp. rate 16, height 1.575 m (5' 2\"), weight 56.2 kg (124 lb), SpO2 98 %. Body mass index is 22.68 kg/m².     Physical Exam:  General: Alert, oriented in no acute distress.  Eye contact is good, speech is normal, affect calm  Lungs: clear to auscultation bilaterally, normal " effort, no wheeze/ rhonchi/ rales.  CV: regular rate and rhythm, S1, S2, no murmur  Pelvic: external genitalia pink, moist. No lesions. Vaginal canal with scant white discharge.  Mild central cystocele   Ext: no edema, color normal, vascularity normal, temperature normal    Assessment and Plan:   The following treatment plan was discussed  1. Acquired female bladder prolapse   mild cystocele.  Patient denies any vaginal pain, difficulty emptying bladder.  States that this is a minor concern for her, declines referral at this time.  We will continue to monitor       Followup: As needed         Please note that this dictation was created using voice recognition software. I have worked with consultants from the vendor as well as technical experts from [employer] to optimize the interface. I have made every reasonable attempt to correct obvious errors, but I expect that there are errors of grammar and possibly content that I did not discover before finalizing the note.

## 2018-11-27 DIAGNOSIS — E03.9 ACQUIRED HYPOTHYROIDISM: ICD-10-CM

## 2018-11-27 RX ORDER — LEVOTHYROXINE SODIUM 88 UG/1
88 TABLET ORAL
Qty: 90 TAB | Refills: 3 | Status: SHIPPED | OUTPATIENT
Start: 2018-11-27 | End: 2019-11-06 | Stop reason: SDUPTHER

## 2019-02-13 ENCOUNTER — HOSPITAL ENCOUNTER (OUTPATIENT)
Dept: LAB | Facility: MEDICAL CENTER | Age: 54
End: 2019-02-13
Attending: NURSE PRACTITIONER
Payer: COMMERCIAL

## 2019-02-13 LAB
ALBUMIN SERPL BCP-MCNC: 4.3 G/DL (ref 3.2–4.9)
ALBUMIN/GLOB SERPL: 1.4 G/DL
ALP SERPL-CCNC: 44 U/L (ref 30–99)
ALT SERPL-CCNC: 14 U/L (ref 2–50)
ANION GAP SERPL CALC-SCNC: 9 MMOL/L (ref 0–11.9)
AST SERPL-CCNC: 17 U/L (ref 12–45)
BASOPHILS # BLD AUTO: 0.7 % (ref 0–1.8)
BASOPHILS # BLD: 0.07 K/UL (ref 0–0.12)
BILIRUB SERPL-MCNC: 1.5 MG/DL (ref 0.1–1.5)
BUN SERPL-MCNC: 18 MG/DL (ref 8–22)
CALCIUM SERPL-MCNC: 9.6 MG/DL (ref 8.5–10.5)
CHLORIDE SERPL-SCNC: 103 MMOL/L (ref 96–112)
CO2 SERPL-SCNC: 28 MMOL/L (ref 20–33)
CREAT SERPL-MCNC: 0.68 MG/DL (ref 0.5–1.4)
EOSINOPHIL # BLD AUTO: 0.07 K/UL (ref 0–0.51)
EOSINOPHIL NFR BLD: 0.7 % (ref 0–6.9)
ERYTHROCYTE [DISTWIDTH] IN BLOOD BY AUTOMATED COUNT: 48.4 FL (ref 35.9–50)
FASTING STATUS PATIENT QL REPORTED: NORMAL
GLOBULIN SER CALC-MCNC: 3.1 G/DL (ref 1.9–3.5)
GLUCOSE SERPL-MCNC: 79 MG/DL (ref 65–99)
HCT VFR BLD AUTO: 40.7 % (ref 37–47)
HGB BLD-MCNC: 12.9 G/DL (ref 12–16)
IMM GRANULOCYTES # BLD AUTO: 0.03 K/UL (ref 0–0.11)
IMM GRANULOCYTES NFR BLD AUTO: 0.3 % (ref 0–0.9)
LYMPHOCYTES # BLD AUTO: 1.98 K/UL (ref 1–4.8)
LYMPHOCYTES NFR BLD: 19.5 % (ref 22–41)
MCH RBC QN AUTO: 30.9 PG (ref 27–33)
MCHC RBC AUTO-ENTMCNC: 31.7 G/DL (ref 33.6–35)
MCV RBC AUTO: 97.4 FL (ref 81.4–97.8)
MONOCYTES # BLD AUTO: 0.85 K/UL (ref 0–0.85)
MONOCYTES NFR BLD AUTO: 8.4 % (ref 0–13.4)
NEUTROPHILS # BLD AUTO: 7.14 K/UL (ref 2–7.15)
NEUTROPHILS NFR BLD: 70.4 % (ref 44–72)
NRBC # BLD AUTO: 0 K/UL
NRBC BLD-RTO: 0 /100 WBC
PLATELET # BLD AUTO: 447 K/UL (ref 164–446)
PMV BLD AUTO: 9.1 FL (ref 9–12.9)
POTASSIUM SERPL-SCNC: 3.7 MMOL/L (ref 3.6–5.5)
PROT SERPL-MCNC: 7.4 G/DL (ref 6–8.2)
RBC # BLD AUTO: 4.18 M/UL (ref 4.2–5.4)
SODIUM SERPL-SCNC: 140 MMOL/L (ref 135–145)
WBC # BLD AUTO: 10.1 K/UL (ref 4.8–10.8)

## 2019-02-13 PROCEDURE — 36415 COLL VENOUS BLD VENIPUNCTURE: CPT

## 2019-02-13 PROCEDURE — 85025 COMPLETE CBC W/AUTO DIFF WBC: CPT

## 2019-02-13 PROCEDURE — 80053 COMPREHEN METABOLIC PANEL: CPT

## 2019-02-19 ENCOUNTER — HOSPITAL ENCOUNTER (OUTPATIENT)
Dept: LAB | Facility: MEDICAL CENTER | Age: 54
End: 2019-02-19
Attending: NURSE PRACTITIONER
Payer: COMMERCIAL

## 2019-02-19 PROCEDURE — 86140 C-REACTIVE PROTEIN: CPT

## 2019-02-19 PROCEDURE — 36415 COLL VENOUS BLD VENIPUNCTURE: CPT

## 2019-02-20 LAB — CRP SERPL HS-MCNC: 0.27 MG/DL (ref 0–0.75)

## 2019-02-23 DIAGNOSIS — N32.81 OVERACTIVE BLADDER: ICD-10-CM

## 2019-02-25 RX ORDER — OXYBUTYNIN CHLORIDE 5 MG/1
TABLET ORAL
Qty: 270 TAB | Refills: 1 | Status: SHIPPED | OUTPATIENT
Start: 2019-02-25 | End: 2019-07-10

## 2019-03-04 ENCOUNTER — HOSPITAL ENCOUNTER (OUTPATIENT)
Facility: MEDICAL CENTER | Age: 54
End: 2019-03-04
Attending: NURSE PRACTITIONER
Payer: COMMERCIAL

## 2019-03-04 PROCEDURE — 87493 C DIFF AMPLIFIED PROBE: CPT

## 2019-03-05 LAB
C DIFF DNA SPEC QL NAA+PROBE: NEGATIVE
C DIFF TOX GENS STL QL NAA+PROBE: NEGATIVE

## 2019-03-07 ENCOUNTER — HOSPITAL ENCOUNTER (OUTPATIENT)
Facility: MEDICAL CENTER | Age: 54
End: 2019-03-07
Attending: NURSE PRACTITIONER

## 2019-03-07 PROCEDURE — 83993 ASSAY FOR CALPROTECTIN FECAL: CPT

## 2019-03-09 LAB — CALPROTECTIN STL-MCNT: 156 UG/G

## 2019-07-09 ENCOUNTER — TELEPHONE (OUTPATIENT)
Dept: MEDICAL GROUP | Facility: MEDICAL CENTER | Age: 54
End: 2019-07-09

## 2019-07-09 DIAGNOSIS — N95.0 POSTMENOPAUSAL BLEEDING: ICD-10-CM

## 2019-07-09 DIAGNOSIS — N81.10 BLADDER PROLAPSE, FEMALE, ACQUIRED: ICD-10-CM

## 2019-07-09 NOTE — TELEPHONE ENCOUNTER
Please inform patient I recommend she see a specialist, will start an urgent referral.  Postmenopausal bleeding requires further evaluation, possibly a biopsy.  She should receive a phone call to schedule with OB/GYN in the next few days.  If bleeding is heavy or she is having pain I recommend ER evaluation

## 2019-07-09 NOTE — TELEPHONE ENCOUNTER
VOICEMAIL ENCOUNTER:    1. Caller Name: Mira Ramirez                              Call Back Number: [phone]    2. Message: Patient called and left voice message, she was crying over the phone, stating that she feels like her uterus is getting lower and lower, she said she mentioned this at her last visit, states that yesterday she was bleeding and is very concerned, does not know if she needs to go to the ER, patient was begging for an appointment to see you today or tomorrow, she is not schedule to see you until next week and would like to come in sooner. Please advise..    3. Patient approves office to leave a detailed voicemail/6sicuro.ithart message: N\A

## 2019-07-10 ENCOUNTER — APPOINTMENT (OUTPATIENT)
Dept: RADIOLOGY | Facility: MEDICAL CENTER | Age: 54
End: 2019-07-10
Attending: EMERGENCY MEDICINE
Payer: COMMERCIAL

## 2019-07-10 ENCOUNTER — HOSPITAL ENCOUNTER (EMERGENCY)
Facility: MEDICAL CENTER | Age: 54
End: 2019-07-10
Attending: EMERGENCY MEDICINE
Payer: COMMERCIAL

## 2019-07-10 VITALS
WEIGHT: 127.21 LBS | TEMPERATURE: 98.2 F | OXYGEN SATURATION: 97 % | RESPIRATION RATE: 16 BRPM | HEIGHT: 62 IN | SYSTOLIC BLOOD PRESSURE: 108 MMHG | BODY MASS INDEX: 23.41 KG/M2 | DIASTOLIC BLOOD PRESSURE: 62 MMHG | HEART RATE: 68 BPM

## 2019-07-10 DIAGNOSIS — N93.8 DYSFUNCTIONAL UTERINE BLEEDING: ICD-10-CM

## 2019-07-10 LAB
ALBUMIN SERPL BCP-MCNC: 4.2 G/DL (ref 3.2–4.9)
ALBUMIN/GLOB SERPL: 1.1 G/DL
ALP SERPL-CCNC: 49 U/L (ref 30–99)
ALT SERPL-CCNC: 14 U/L (ref 2–50)
ANION GAP SERPL CALC-SCNC: 12 MMOL/L (ref 0–11.9)
APPEARANCE UR: CLEAR
AST SERPL-CCNC: 18 U/L (ref 12–45)
BACTERIA #/AREA URNS HPF: ABNORMAL /HPF
BASOPHILS # BLD AUTO: 1.1 % (ref 0–1.8)
BASOPHILS # BLD: 0.06 K/UL (ref 0–0.12)
BILIRUB SERPL-MCNC: 1.5 MG/DL (ref 0.1–1.5)
BILIRUB UR QL STRIP.AUTO: NEGATIVE
BUN SERPL-MCNC: 23 MG/DL (ref 8–22)
CALCIUM SERPL-MCNC: 10 MG/DL (ref 8.4–10.2)
CHLORIDE SERPL-SCNC: 102 MMOL/L (ref 96–112)
CO2 SERPL-SCNC: 25 MMOL/L (ref 20–33)
COLOR UR: YELLOW
CREAT SERPL-MCNC: 0.69 MG/DL (ref 0.5–1.4)
EOSINOPHIL # BLD AUTO: 0.17 K/UL (ref 0–0.51)
EOSINOPHIL NFR BLD: 3.1 % (ref 0–6.9)
EPI CELLS #/AREA URNS HPF: ABNORMAL /HPF
ERYTHROCYTE [DISTWIDTH] IN BLOOD BY AUTOMATED COUNT: 46.5 FL (ref 35.9–50)
GLOBULIN SER CALC-MCNC: 4 G/DL (ref 1.9–3.5)
GLUCOSE SERPL-MCNC: 94 MG/DL (ref 65–99)
GLUCOSE UR STRIP.AUTO-MCNC: NEGATIVE MG/DL
HCG SERPL QL: NEGATIVE
HCT VFR BLD AUTO: 36.9 % (ref 37–47)
HGB BLD-MCNC: 11.7 G/DL (ref 12–16)
IMM GRANULOCYTES # BLD AUTO: 0.01 K/UL (ref 0–0.11)
IMM GRANULOCYTES NFR BLD AUTO: 0.2 % (ref 0–0.9)
KETONES UR STRIP.AUTO-MCNC: NEGATIVE MG/DL
LEUKOCYTE ESTERASE UR QL STRIP.AUTO: NEGATIVE
LYMPHOCYTES # BLD AUTO: 1.55 K/UL (ref 1–4.8)
LYMPHOCYTES NFR BLD: 28.7 % (ref 22–41)
MCH RBC QN AUTO: 28.6 PG (ref 27–33)
MCHC RBC AUTO-ENTMCNC: 31.7 G/DL (ref 33.6–35)
MCV RBC AUTO: 90.2 FL (ref 81.4–97.8)
MICRO URNS: ABNORMAL
MONOCYTES # BLD AUTO: 0.59 K/UL (ref 0–0.85)
MONOCYTES NFR BLD AUTO: 10.9 % (ref 0–13.4)
MUCOUS THREADS #/AREA URNS HPF: ABNORMAL /HPF
NEUTROPHILS # BLD AUTO: 3.02 K/UL (ref 2–7.15)
NEUTROPHILS NFR BLD: 56 % (ref 44–72)
NITRITE UR QL STRIP.AUTO: NEGATIVE
NRBC # BLD AUTO: 0 K/UL
NRBC BLD-RTO: 0 /100 WBC
PH UR STRIP.AUTO: 6 [PH]
PLATELET # BLD AUTO: 418 K/UL (ref 164–446)
PMV BLD AUTO: 8.6 FL (ref 9–12.9)
POTASSIUM SERPL-SCNC: 4.1 MMOL/L (ref 3.6–5.5)
PROT SERPL-MCNC: 8.2 G/DL (ref 6–8.2)
PROT UR QL STRIP: NEGATIVE MG/DL
RBC # BLD AUTO: 4.09 M/UL (ref 4.2–5.4)
RBC # URNS HPF: ABNORMAL /HPF
RBC UR QL AUTO: ABNORMAL
SODIUM SERPL-SCNC: 139 MMOL/L (ref 135–145)
SP GR UR STRIP.AUTO: 1.02
WBC # BLD AUTO: 5.4 K/UL (ref 4.8–10.8)
WBC #/AREA URNS HPF: ABNORMAL /HPF

## 2019-07-10 PROCEDURE — 84703 CHORIONIC GONADOTROPIN ASSAY: CPT

## 2019-07-10 PROCEDURE — 36415 COLL VENOUS BLD VENIPUNCTURE: CPT

## 2019-07-10 PROCEDURE — 76856 US EXAM PELVIC COMPLETE: CPT

## 2019-07-10 PROCEDURE — 85025 COMPLETE CBC W/AUTO DIFF WBC: CPT

## 2019-07-10 PROCEDURE — 80053 COMPREHEN METABOLIC PANEL: CPT

## 2019-07-10 PROCEDURE — 99284 EMERGENCY DEPT VISIT MOD MDM: CPT

## 2019-07-10 PROCEDURE — 81001 URINALYSIS AUTO W/SCOPE: CPT

## 2019-07-10 RX ORDER — MESALAMINE 0.38 G/1
1.5 CAPSULE, EXTENDED RELEASE ORAL EVERY MORNING
COMMUNITY
Start: 2019-07-01

## 2019-07-10 NOTE — ED PROVIDER NOTES
ED Provider Note    CHIEF COMPLAINT  Chief Complaint   Patient presents with   • Vaginal Bleeding     vaginal spotting, cramping, and uterine prolapse  Has already went through menopause       HPI  Mira Ramirez is a 53 y.o. female who presents for evaluation of vaginal bleeding, discomfort with history of uterine prolapse.  The patient has a gynecologist locally.  She called them earlier this week and not able to get into Monday.  She denies any history of easy bruising or bleeding no high fevers or chills.  She has no report of dysuria or hematuria no night sweats or weight loss.    REVIEW OF SYSTEMS  See HPI for further details.  No high fevers chills night sweats weight loss all other systems are negative.     PAST MEDICAL HISTORY  No past medical history on file.  Thyroid disease  FAMILY HISTORY  No history of hemophilia    SOCIAL HISTORY  Social History     Social History   • Marital status:      Spouse name: N/A   • Number of children: N/A   • Years of education: N/A     Social History Main Topics   • Smoking status: Never Smoker   • Smokeless tobacco: Never Used   • Alcohol use 0.0 oz/week      Comment: rare   • Drug use: No   • Sexual activity: Not Currently     Partners: Male     Other Topics Concern   • Not on file     Social History Narrative   • No narrative on file     Denies drugs or alcohol  SURGICAL HISTORY  Past Surgical History:   Procedure Laterality Date   • EYE SURGERY         CURRENT MEDICATIONS  No current facility-administered medications for this encounter.     Current Outpatient Prescriptions:   •  Mesalamine (APRISO) 0.375 GM CAPSULE SR 24 HR, Take 1.5 g by mouth every morning., Disp: , Rfl:   •  IRON PO, Take 1 Tab by mouth every day., Disp: , Rfl:   •  levothyroxine (SYNTHROID) 88 MCG Tab, Take 1 Tab by mouth Every morning on an empty stomach., Disp: 90 Tab, Rfl: 3  •  oxybutynin (DITROPAN) 5 MG Tab, Take 1 Tab by mouth 2 Times a Day., Disp: 60 Tab, Rfl: 11  •  calcium  "citrate 315 mg-vitamin D 200 mg (CALCIUM CITRATE +) 315-200 MG-UNIT per tablet, Take 1 Tab by mouth every day., Disp: , Rfl:   •  azathioprine (IMURAN) 50 MG Tab, Take  mg by mouth 2 Times a Day. 50mg in AM, 100mg in PM, Disp: , Rfl:       ALLERGIES  Allergies   Allergen Reactions   • Sulfa Drugs Shortness of Breath   • Sulfasalazine Shortness of Breath   • Latex Unspecified     Bumps     • Mesalamine Shortness of Breath     Given new RX 7/2019, currently taking       PHYSICAL EXAM  VITAL SIGNS: /69   Pulse 78   Temp 37.1 °C (98.7 °F) (Temporal)   Resp 16   Ht 1.575 m (5' 2\")   Wt 57.7 kg (127 lb 3.3 oz)   SpO2 96%   BMI 23.27 kg/m²  Room air O2: 96    Constitutional: Well developed, Well nourished, No acute distress, Non-toxic appearance.   HENT: Normocephalic, Atraumatic, Bilateral external ears normal, Oropharynx moist, No oral exudates, Nose normal.   Eyes: PERRLA, EOMI, Conjunctiva normal, No discharge.   Neck: Normal range of motion, No tenderness, Supple, No stridor.   Cardiovascular: Normal heart rate, Normal rhythm, No murmurs, No rubs, No gallops.   Thorax & Lungs: Normal breath sounds, No respiratory distress, No wheezing, No chest tenderness.   Abdomen: Bowel sounds normal, Soft, No tenderness, No masses, No pulsatile masses.   Skin: Warm, Dry, No erythema, No rash.   Back: No tenderness, No CVA tenderness.   Genitalia: Patient declined pelvic exam  Extremities: Intact distal pulses, No edema, No tenderness, No cyanosis, No clubbing.   Neurologic: Alert & oriented x 3, Normal motor function, Normal sensory function, No focal deficits noted.   Psychiatric: Anxious  Results for orders placed or performed during the hospital encounter of 07/10/19   CBC with Differential   Result Value Ref Range    WBC 5.4 4.8 - 10.8 K/uL    RBC 4.09 (L) 4.20 - 5.40 M/uL    Hemoglobin 11.7 (L) 12.0 - 16.0 g/dL    Hematocrit 36.9 (L) 37.0 - 47.0 %    MCV 90.2 81.4 - 97.8 fL    MCH 28.6 27.0 - 33.0 pg    " MCHC 31.7 (L) 33.6 - 35.0 g/dL    RDW 46.5 35.9 - 50.0 fL    Platelet Count 418 164 - 446 K/uL    MPV 8.6 (L) 9.0 - 12.9 fL    Neutrophils-Polys 56.00 44.00 - 72.00 %    Lymphocytes 28.70 22.00 - 41.00 %    Monocytes 10.90 0.00 - 13.40 %    Eosinophils 3.10 0.00 - 6.90 %    Basophils 1.10 0.00 - 1.80 %    Immature Granulocytes 0.20 0.00 - 0.90 %    Nucleated RBC 0.00 /100 WBC    Neutrophils (Absolute) 3.02 2.00 - 7.15 K/uL    Lymphs (Absolute) 1.55 1.00 - 4.80 K/uL    Monos (Absolute) 0.59 0.00 - 0.85 K/uL    Eos (Absolute) 0.17 0.00 - 0.51 K/uL    Baso (Absolute) 0.06 0.00 - 0.12 K/uL    Immature Granulocytes (abs) 0.01 0.00 - 0.11 K/uL    NRBC (Absolute) 0.00 K/uL   Comp Metabolic Panel   Result Value Ref Range    Sodium 139 135 - 145 mmol/L    Potassium 4.1 3.6 - 5.5 mmol/L    Chloride 102 96 - 112 mmol/L    Co2 25 20 - 33 mmol/L    Anion Gap 12.0 (H) 0.0 - 11.9    Glucose 94 65 - 99 mg/dL    Bun 23 (H) 8 - 22 mg/dL    Creatinine 0.69 0.50 - 1.40 mg/dL    Calcium 10.0 8.4 - 10.2 mg/dL    AST(SGOT) 18 12 - 45 U/L    ALT(SGPT) 14 2 - 50 U/L    Alkaline Phosphatase 49 30 - 99 U/L    Total Bilirubin 1.5 0.1 - 1.5 mg/dL    Albumin 4.2 3.2 - 4.9 g/dL    Total Protein 8.2 6.0 - 8.2 g/dL    Globulin 4.0 (H) 1.9 - 3.5 g/dL    A-G Ratio 1.1 g/dL   HCG Qual Serum   Result Value Ref Range    Beta-Hcg Qualitative Serum Negative Negative   ESTIMATED GFR   Result Value Ref Range    GFR If African American >60 >60 mL/min/1.73 m 2    GFR If Non African American >60 >60 mL/min/1.73 m 2   URINALYSIS,CULTURE IF INDICATED   Result Value Ref Range    Micro Urine Req Microscopic         RADIOLOGY/PROCEDURES  US-PELVIC COMPLETE (TRANSABDOMINAL/TRANSVAGINAL) (COMBO)   Final Result      1.  Small subserosal uterine leiomyoma.      2.  3.3 cm left ovarian cyst with a tiny peripheral calcification, likely benign. Consider follow-up imaging to ensure stability or resolution.            COURSE & MEDICAL DECISION MAKING  Pertinent Labs &  Imaging studies reviewed. (See chart for details)  Patient presents here with some very mild vaginal bleeding.  She has no tachycardia her hemoglobin is on the lower end but is reasonable at 11.7.  She is not on any anticoagulant therapy.  Ultrasound demonstrates a leiomyoma as well as an ovarian cyst.  The patient apparently Estuardo has follow-up with gynecology.  Due to her age I will not place her on any hormones and I will provide her copies of her records for gynecological follow-up which apparently will happen on Monday    FINAL IMPRESSION  1.  Dysfunctional uterine bleeding         Electronically signed by: Miguel Riggs, 7/10/2019 3:33 PM

## 2019-07-10 NOTE — ED NOTES
Med rec completed per pt at bedside and a call to verify new RX with pharmacy (Apriso) that pt was unsure of   Allergies reviewed  No ABX in last 14 days

## 2019-07-11 ENCOUNTER — GYNECOLOGY VISIT (OUTPATIENT)
Dept: OBGYN | Facility: MEDICAL CENTER | Age: 54
End: 2019-07-11
Payer: COMMERCIAL

## 2019-07-11 ENCOUNTER — HOSPITAL ENCOUNTER (OUTPATIENT)
Facility: MEDICAL CENTER | Age: 54
End: 2019-07-11
Attending: OBSTETRICS & GYNECOLOGY
Payer: COMMERCIAL

## 2019-07-11 VITALS
BODY MASS INDEX: 22.82 KG/M2 | WEIGHT: 124 LBS | DIASTOLIC BLOOD PRESSURE: 64 MMHG | SYSTOLIC BLOOD PRESSURE: 100 MMHG | HEIGHT: 62 IN

## 2019-07-11 DIAGNOSIS — Z11.51 SCREENING FOR HUMAN PAPILLOMAVIRUS (HPV): ICD-10-CM

## 2019-07-11 DIAGNOSIS — N81.3 THIRD DEGREE UTERINE PROLAPSE: ICD-10-CM

## 2019-07-11 DIAGNOSIS — Z12.4 SCREENING FOR CERVICAL CANCER: ICD-10-CM

## 2019-07-11 DIAGNOSIS — N95.0 POST-MENOPAUSAL BLEEDING: ICD-10-CM

## 2019-07-11 DIAGNOSIS — Z12.39 SCREENING FOR BREAST CANCER: ICD-10-CM

## 2019-07-11 PROCEDURE — 87624 HPV HI-RISK TYP POOLED RSLT: CPT

## 2019-07-11 PROCEDURE — 99204 OFFICE O/P NEW MOD 45 MIN: CPT | Performed by: OBSTETRICS & GYNECOLOGY

## 2019-07-11 PROCEDURE — 88175 CYTOPATH C/V AUTO FLUID REDO: CPT

## 2019-07-12 ENCOUNTER — TELEPHONE (OUTPATIENT)
Dept: OBGYN | Facility: CLINIC | Age: 54
End: 2019-07-12

## 2019-07-12 DIAGNOSIS — Z12.4 SCREENING FOR CERVICAL CANCER: ICD-10-CM

## 2019-07-12 DIAGNOSIS — Z11.51 SCREENING FOR HUMAN PAPILLOMAVIRUS (HPV): ICD-10-CM

## 2019-07-12 LAB
CYTOLOGY REG CYTOL: NORMAL
HPV HR 12 DNA CVX QL NAA+PROBE: NEGATIVE
HPV16 DNA SPEC QL NAA+PROBE: NEGATIVE
HPV18 DNA SPEC QL NAA+PROBE: NEGATIVE
SPECIMEN SOURCE: NORMAL

## 2019-07-12 NOTE — TELEPHONE ENCOUNTER
Pt called stating she was going to get a second opinion because she feels her uterine prolapse should be taken care of sooner rather than later, pt requested to make an appointment with Dr. Crow, adv pt she is in the same group as Dr. Dewitt, pt stated she will call another MD and get a second opinion and wait for EMB

## 2019-07-16 ENCOUNTER — HOSPITAL ENCOUNTER (OUTPATIENT)
Dept: RADIOLOGY | Facility: MEDICAL CENTER | Age: 54
End: 2019-07-16
Attending: NURSE PRACTITIONER
Payer: COMMERCIAL

## 2019-07-16 ENCOUNTER — OFFICE VISIT (OUTPATIENT)
Dept: MEDICAL GROUP | Facility: MEDICAL CENTER | Age: 54
End: 2019-07-16
Payer: COMMERCIAL

## 2019-07-16 VITALS
HEIGHT: 61 IN | TEMPERATURE: 98.1 F | OXYGEN SATURATION: 94 % | BODY MASS INDEX: 23.6 KG/M2 | WEIGHT: 125 LBS | SYSTOLIC BLOOD PRESSURE: 112 MMHG | HEART RATE: 69 BPM | DIASTOLIC BLOOD PRESSURE: 88 MMHG | RESPIRATION RATE: 16 BRPM

## 2019-07-16 DIAGNOSIS — M25.532 LEFT WRIST PAIN: ICD-10-CM

## 2019-07-16 PROCEDURE — 99213 OFFICE O/P EST LOW 20 MIN: CPT | Performed by: NURSE PRACTITIONER

## 2019-07-16 PROCEDURE — 73100 X-RAY EXAM OF WRIST: CPT | Mod: LT

## 2019-07-16 ASSESSMENT — PATIENT HEALTH QUESTIONNAIRE - PHQ9: CLINICAL INTERPRETATION OF PHQ2 SCORE: 0

## 2019-07-16 NOTE — PROGRESS NOTES
"Subjective:     Chief Complaint   Patient presents with   • Hand Pain     LEFT HAND ONLY, THROBBING PAIN      Mira Ramirez is a 53 y.o. female established patient here for evaluation of left wrist pain.  The wrist is been bothering her for the last several weeks, gradually worsening.  Pain is up to 7 out of 10 at times, and the dorsal aspect of the wrist, the \"throbbing\".  She works as a  and is constantly using her hands, this seems to be exacerbating her discomfort.  She also feels that her flexion at the wrist is somewhat limited.  No history of injury, discoloration.  No numbness or tingling in the hand.  She has been trying a topical over-the-counter analgesic without much improvement.  She also has a wrist brace    She has recently been evaluated by OB/GYN for uterine prolapse and postmenopausal bleeding, she is awaiting authorization for endometrial biopsy    No problem-specific Assessment & Plan notes found for this encounter.       Current medicines (including changes today)  Current Outpatient Prescriptions   Medication Sig Dispense Refill   • Diclofenac Sodium 1 % Gel Apply 2-4 g to skin as directed 4 times a day as needed. 1 Tube 5   • Mesalamine (APRISO) 0.375 GM CAPSULE SR 24 HR Take 1.5 g by mouth every morning.     • IRON PO Take 1 Tab by mouth every day.     • levothyroxine (SYNTHROID) 88 MCG Tab Take 1 Tab by mouth Every morning on an empty stomach. 90 Tab 3   • oxybutynin (DITROPAN) 5 MG Tab Take 1 Tab by mouth 2 Times a Day. 60 Tab 11   • calcium citrate 315 mg-vitamin D 200 mg (CALCIUM CITRATE +) 315-200 MG-UNIT per tablet Take 1 Tab by mouth every day.     • azathioprine (IMURAN) 50 MG Tab Take  mg by mouth 2 Times a Day. 50mg in AM, 100mg in PM       No current facility-administered medications for this visit.      She  has a past medical history of Bleeding from the nose; Blood transfusion without reported diagnosis; Hay fever; Hives; Measles; and Mumps.    ROS included " "above     Objective:     /88 (BP Location: Left arm, Patient Position: Sitting, BP Cuff Size: Adult)   Pulse 69   Temp 36.7 °C (98.1 °F) (Temporal)   Resp 16   Ht 1.549 m (5' 1\")   Wt 56.7 kg (125 lb)   SpO2 94%  Body mass index is 23.62 kg/m².     Physical Exam:  General: Alert, oriented in no acute distress.  Eye contact is good, speech is normal, affect calm  Lungs: clear to auscultation bilaterally, normal effort, no wheeze/ rhonchi/ rales.  CV: regular rate and rhythm, S1, S2, no murmur  MS: Moderately tender of the left dorsal wrist with slight swelling present, no discrete nodule, no overlying erythema.  Slight limitation of dorsiflexion at the wrist, normal range of motion otherwise Ext: no edema, color normal, vascularity normal, temperature normal    Assessment and Plan:   The following treatment plan was discussed   1. Left wrist pain   possible ganglion cyst in the dorsal left wrist somewhat limiting flexion.  Will evaluate x-ray, may try topical diclofenac or oral NSAID as needed.  I will follow-up with her pending results  DX-WRIST-LIMITED 2- LEFT    Diclofenac Sodium 1 % Gel       Followup: Pending test         Please note that this dictation was created using voice recognition software. I have worked with consultants from the vendor as well as technical experts from [employer] to optimize the interface. I have made every reasonable attempt to correct obvious errors, but I expect that there are errors of grammar and possibly content that I did not discover before finalizing the note.       "

## 2019-07-23 ENCOUNTER — HOSPITAL ENCOUNTER (OUTPATIENT)
Dept: LAB | Facility: MEDICAL CENTER | Age: 54
End: 2019-07-23
Attending: NURSE PRACTITIONER
Payer: COMMERCIAL

## 2019-07-23 LAB
BASOPHILS # BLD AUTO: 1.3 % (ref 0–1.8)
BASOPHILS # BLD: 0.08 K/UL (ref 0–0.12)
EOSINOPHIL # BLD AUTO: 0.11 K/UL (ref 0–0.51)
EOSINOPHIL NFR BLD: 1.8 % (ref 0–6.9)
ERYTHROCYTE [DISTWIDTH] IN BLOOD BY AUTOMATED COUNT: 47.8 FL (ref 35.9–50)
HCT VFR BLD AUTO: 36.5 % (ref 37–47)
HGB BLD-MCNC: 11.3 G/DL (ref 12–16)
IMM GRANULOCYTES # BLD AUTO: 0.01 K/UL (ref 0–0.11)
IMM GRANULOCYTES NFR BLD AUTO: 0.2 % (ref 0–0.9)
LYMPHOCYTES # BLD AUTO: 1.27 K/UL (ref 1–4.8)
LYMPHOCYTES NFR BLD: 20.4 % (ref 22–41)
MCH RBC QN AUTO: 28.7 PG (ref 27–33)
MCHC RBC AUTO-ENTMCNC: 31 G/DL (ref 33.6–35)
MCV RBC AUTO: 92.6 FL (ref 81.4–97.8)
MONOCYTES # BLD AUTO: 0.46 K/UL (ref 0–0.85)
MONOCYTES NFR BLD AUTO: 7.4 % (ref 0–13.4)
NEUTROPHILS # BLD AUTO: 4.29 K/UL (ref 2–7.15)
NEUTROPHILS NFR BLD: 68.9 % (ref 44–72)
NRBC # BLD AUTO: 0 K/UL
NRBC BLD-RTO: 0 /100 WBC
PLATELET # BLD AUTO: 364 K/UL (ref 164–446)
PMV BLD AUTO: 9.3 FL (ref 9–12.9)
RBC # BLD AUTO: 3.94 M/UL (ref 4.2–5.4)
WBC # BLD AUTO: 6.2 K/UL (ref 4.8–10.8)

## 2019-07-23 PROCEDURE — 80053 COMPREHEN METABOLIC PANEL: CPT

## 2019-07-23 PROCEDURE — 36415 COLL VENOUS BLD VENIPUNCTURE: CPT

## 2019-07-23 PROCEDURE — 85025 COMPLETE CBC W/AUTO DIFF WBC: CPT

## 2019-07-24 LAB
ALBUMIN SERPL BCP-MCNC: 4.3 G/DL (ref 3.2–4.9)
ALBUMIN/GLOB SERPL: 1.3 G/DL
ALP SERPL-CCNC: 45 U/L (ref 30–99)
ALT SERPL-CCNC: 9 U/L (ref 2–50)
ANION GAP SERPL CALC-SCNC: 7 MMOL/L (ref 0–11.9)
AST SERPL-CCNC: 16 U/L (ref 12–45)
BILIRUB SERPL-MCNC: 1.7 MG/DL (ref 0.1–1.5)
BUN SERPL-MCNC: 16 MG/DL (ref 8–22)
CALCIUM SERPL-MCNC: 9.8 MG/DL (ref 8.5–10.5)
CHLORIDE SERPL-SCNC: 106 MMOL/L (ref 96–112)
CO2 SERPL-SCNC: 26 MMOL/L (ref 20–33)
CREAT SERPL-MCNC: 0.65 MG/DL (ref 0.5–1.4)
FASTING STATUS PATIENT QL REPORTED: NORMAL
GLOBULIN SER CALC-MCNC: 3.3 G/DL (ref 1.9–3.5)
GLUCOSE SERPL-MCNC: 94 MG/DL (ref 65–99)
POTASSIUM SERPL-SCNC: 4.1 MMOL/L (ref 3.6–5.5)
PROT SERPL-MCNC: 7.6 G/DL (ref 6–8.2)
SODIUM SERPL-SCNC: 139 MMOL/L (ref 135–145)

## 2019-08-20 ENCOUNTER — TELEPHONE (OUTPATIENT)
Dept: MEDICAL GROUP | Facility: MEDICAL CENTER | Age: 54
End: 2019-08-20

## 2019-08-20 NOTE — TELEPHONE ENCOUNTER
VOICEMAIL ENCOUNTER:    1. Caller Name: Mira Ramirez                              Call Back Number: [phone]    2. Message: Patient called and states the cream you prescribed her for her hand is not working, her hand is still swollen and red and painful, also nobody has working on patient referral to orthopedics, I will try contacting someone in referrals dept.    3. Patient approves office to leave a detailed voicemail/MyChart message: N\A

## 2019-08-21 NOTE — TELEPHONE ENCOUNTER
Referral done, please notify pt    [facility]  [address]  [place] [address]  Phone: [phone]  Fax: [phone]  Patient Care Coordination notes:  Referral faxed

## 2019-11-06 DIAGNOSIS — N32.81 OVERACTIVE BLADDER: ICD-10-CM

## 2019-11-06 DIAGNOSIS — E03.9 ACQUIRED HYPOTHYROIDISM: ICD-10-CM

## 2019-11-06 RX ORDER — LEVOTHYROXINE SODIUM 88 UG/1
88 TABLET ORAL
Qty: 90 TAB | Refills: 1 | Status: SHIPPED | OUTPATIENT
Start: 2019-11-06 | End: 2020-03-24

## 2019-11-06 RX ORDER — OXYBUTYNIN CHLORIDE 5 MG/1
5 TABLET ORAL 2 TIMES DAILY
Qty: 60 TAB | Refills: 5 | Status: SHIPPED | OUTPATIENT
Start: 2019-11-06 | End: 2020-05-19

## 2020-03-24 DIAGNOSIS — E03.9 ACQUIRED HYPOTHYROIDISM: ICD-10-CM

## 2020-03-24 RX ORDER — LEVOTHYROXINE SODIUM 88 UG/1
TABLET ORAL
Qty: 90 TAB | Refills: 1 | Status: SHIPPED | OUTPATIENT
Start: 2020-03-24

## 2020-03-24 NOTE — TELEPHONE ENCOUNTER
Received request via: Pharmacy    Was the patient seen in the last year in this department? Yes    Does the patient have an active prescription (recently filled or refills available) for medication(s) requested? No     Requested Prescriptions     Pending Prescriptions Disp Refills   • levothyroxine (SYNTHROID) 88 MCG Tab [Pharmacy Med Name: LEVOTHYROXINE 88 MCG TABLET] 90 Tab      Sig: TAKE ONE TABLET BY MOUTH EVERY MORNING ON AN EMPTY STOMACH (SYNTHROID)

## 2020-05-19 DIAGNOSIS — N32.81 OVERACTIVE BLADDER: ICD-10-CM

## 2020-05-19 RX ORDER — OXYBUTYNIN CHLORIDE 5 MG/1
TABLET ORAL
Qty: 60 TAB | Refills: 4 | Status: SHIPPED | OUTPATIENT
Start: 2020-05-19

## 2021-03-15 DIAGNOSIS — Z23 NEED FOR VACCINATION: ICD-10-CM

## 2021-07-20 ENCOUNTER — HOSPITAL ENCOUNTER (OUTPATIENT)
Dept: LAB | Facility: MEDICAL CENTER | Age: 56
End: 2021-07-20
Attending: FAMILY MEDICINE
Payer: COMMERCIAL

## 2021-07-20 LAB
T4 FREE SERPL-MCNC: 1.87 NG/DL (ref 0.93–1.7)
TSH SERPL DL<=0.005 MIU/L-ACNC: 0.1 UIU/ML (ref 0.38–5.33)

## 2021-07-20 PROCEDURE — 36415 COLL VENOUS BLD VENIPUNCTURE: CPT

## 2021-07-20 PROCEDURE — 84439 ASSAY OF FREE THYROXINE: CPT

## 2021-07-20 PROCEDURE — 84443 ASSAY THYROID STIM HORMONE: CPT

## 2021-09-23 ENCOUNTER — HOSPITAL ENCOUNTER (OUTPATIENT)
Dept: LAB | Facility: MEDICAL CENTER | Age: 56
End: 2021-09-23
Attending: FAMILY MEDICINE
Payer: COMMERCIAL

## 2021-09-23 LAB
T4 FREE SERPL-MCNC: 1.6 NG/DL (ref 0.93–1.7)
TSH SERPL DL<=0.005 MIU/L-ACNC: 0.37 UIU/ML (ref 0.38–5.33)

## 2021-09-23 PROCEDURE — 84443 ASSAY THYROID STIM HORMONE: CPT

## 2021-09-23 PROCEDURE — 84439 ASSAY OF FREE THYROXINE: CPT

## 2021-09-23 PROCEDURE — 36415 COLL VENOUS BLD VENIPUNCTURE: CPT

## 2022-01-10 ENCOUNTER — HOSPITAL ENCOUNTER (OUTPATIENT)
Dept: LAB | Facility: MEDICAL CENTER | Age: 57
End: 2022-01-10
Attending: FAMILY MEDICINE
Payer: COMMERCIAL

## 2022-01-10 LAB
CHOLEST SERPL-MCNC: 253 MG/DL (ref 100–199)
FASTING STATUS PATIENT QL REPORTED: NORMAL
HDLC SERPL-MCNC: 58 MG/DL
LDLC SERPL CALC-MCNC: 159 MG/DL
T4 FREE SERPL-MCNC: 1.55 NG/DL (ref 0.93–1.7)
TRIGL SERPL-MCNC: 179 MG/DL (ref 0–149)
TSH SERPL DL<=0.005 MIU/L-ACNC: 0.33 UIU/ML (ref 0.38–5.33)

## 2022-01-10 PROCEDURE — 84443 ASSAY THYROID STIM HORMONE: CPT

## 2022-01-10 PROCEDURE — 36415 COLL VENOUS BLD VENIPUNCTURE: CPT

## 2022-01-10 PROCEDURE — 80061 LIPID PANEL: CPT

## 2022-01-10 PROCEDURE — 84439 ASSAY OF FREE THYROXINE: CPT

## 2022-04-19 ENCOUNTER — HOSPITAL ENCOUNTER (OUTPATIENT)
Dept: LAB | Facility: MEDICAL CENTER | Age: 57
End: 2022-04-19
Attending: FAMILY MEDICINE
Payer: COMMERCIAL

## 2022-04-19 LAB
T4 FREE SERPL-MCNC: 1.78 NG/DL (ref 0.93–1.7)
TSH SERPL DL<=0.005 MIU/L-ACNC: 0.3 UIU/ML (ref 0.38–5.33)

## 2022-04-19 PROCEDURE — 36415 COLL VENOUS BLD VENIPUNCTURE: CPT

## 2022-04-19 PROCEDURE — 84439 ASSAY OF FREE THYROXINE: CPT

## 2022-04-19 PROCEDURE — 84443 ASSAY THYROID STIM HORMONE: CPT

## 2022-11-17 ENCOUNTER — HOSPITAL ENCOUNTER (OUTPATIENT)
Dept: LAB | Facility: MEDICAL CENTER | Age: 57
End: 2022-11-17
Attending: FAMILY MEDICINE
Payer: COMMERCIAL

## 2022-11-17 LAB
CHOLEST SERPL-MCNC: 213 MG/DL (ref 100–199)
EST. AVERAGE GLUCOSE BLD GHB EST-MCNC: 126 MG/DL
HBA1C MFR BLD: 6 % (ref 4–5.6)
HDLC SERPL-MCNC: 60 MG/DL
LDLC SERPL CALC-MCNC: 135 MG/DL
TRIGL SERPL-MCNC: 92 MG/DL (ref 0–149)

## 2022-11-17 PROCEDURE — 36415 COLL VENOUS BLD VENIPUNCTURE: CPT

## 2022-11-17 PROCEDURE — 80061 LIPID PANEL: CPT

## 2022-11-17 PROCEDURE — 83036 HEMOGLOBIN GLYCOSYLATED A1C: CPT

## 2023-09-11 ENCOUNTER — HOSPITAL ENCOUNTER (OUTPATIENT)
Dept: LAB | Facility: MEDICAL CENTER | Age: 58
End: 2023-09-11
Attending: PEDIATRICS
Payer: COMMERCIAL

## 2023-09-11 LAB
25(OH)D3 SERPL-MCNC: 31 NG/ML (ref 30–100)
CRP SERPL HS-MCNC: <0.3 MG/DL (ref 0–0.75)
ERYTHROCYTE [SEDIMENTATION RATE] IN BLOOD BY WESTERGREN METHOD: 33 MM/HOUR (ref 0–25)

## 2023-09-11 PROCEDURE — 82306 VITAMIN D 25 HYDROXY: CPT

## 2023-09-11 PROCEDURE — 36415 COLL VENOUS BLD VENIPUNCTURE: CPT

## 2023-09-11 PROCEDURE — 86140 C-REACTIVE PROTEIN: CPT

## 2023-09-11 PROCEDURE — 85652 RBC SED RATE AUTOMATED: CPT

## 2023-09-20 ENCOUNTER — HOSPITAL ENCOUNTER (OUTPATIENT)
Facility: MEDICAL CENTER | Age: 58
End: 2023-09-20
Attending: NURSE PRACTITIONER
Payer: COMMERCIAL

## 2023-09-20 PROCEDURE — 83993 ASSAY FOR CALPROTECTIN FECAL: CPT

## 2023-09-24 LAB — CALPROTECTIN STL-MCNT: 44 UG/G

## 2023-10-30 ENCOUNTER — NON-PROVIDER VISIT (OUTPATIENT)
Dept: INTERNAL MEDICINE | Facility: OTHER | Age: 58
End: 2023-10-30
Payer: COMMERCIAL

## 2023-10-30 VITALS — BODY MASS INDEX: 22.84 KG/M2 | WEIGHT: 121 LBS | HEIGHT: 61 IN

## 2023-10-30 DIAGNOSIS — K90.9 INTESTINAL MALABSORPTION, UNSPECIFIED TYPE: ICD-10-CM

## 2023-10-30 DIAGNOSIS — Z71.3 DIETARY COUNSELING AND SURVEILLANCE: ICD-10-CM

## 2023-10-30 DIAGNOSIS — K51.019 ULCERATIVE PANCOLITIS WITH COMPLICATION (HCC): ICD-10-CM

## 2023-10-30 DIAGNOSIS — R63.4 UNINTENTIONAL WEIGHT LOSS: ICD-10-CM

## 2023-10-30 PROCEDURE — 97802 MEDICAL NUTRITION INDIV IN: CPT | Performed by: DIETITIAN, REGISTERED

## 2023-10-30 ASSESSMENT — FIBROSIS 4 INDEX: FIB4 SCORE: 0.4

## 2023-10-30 NOTE — PROGRESS NOTES
"Mira Ramirez is a 58 y.o. year old, female initial nutrition evaluation for management of ulcerative colitis.    ROS: UC dx since age 18  years; last flare-up in March 2023    Meds: weaning off mesalamine (Apriso), IV infusion Nleson began on   Supplements: Iron supplement, Calcium 500 mg +D  My Health Profile:    PHYSICAL ASSESSMENT  Current Height:  61\"  Ht Readings from Last 1 Encounters:   07/16/19 1.549 m (5' 1\")     Current Weight:  121#  Wt Readings from Last 1 Encounters:   07/16/19 56.7 kg (125 lb)     BMI:  22    FLUID INTAKE:  water 20 oz/day  Typical Beverages: apple juice seems to help her  Servings Alcohol/D/WK/MO: no  Tobacco: none    ACTIVITY REVIEW: walk dog  Current Exercise: stationary bike  Hobbies:     PERTINENT LABS:    Latest Reference Range & Units 09/11/23 14:13   Sed Rate Westergren 0 - 25 mm/hour 33 (H)   (H): Data is abnormally high      Patient Behaviors (indicate frequency)  Meal/Snack Pattern:     Eating less beef, more vegetables without skin, Cheerios work well with her, bananas    B: Cheerios+Banana  L: PB&Jelly sandwich  D: whatever is out, noodles, tomato soup  or egg or breakfast items  HS: hot chocolate before bed    Fish: salmon, tilapia, coconut prawns    Dines away from Home: occ bur not often  Locations:  restaurant  Who lives in home: son, self and renter  Additional Patient Behavior Information: cooks for self and son    PES: Ulcerative Colitis w/recent long flare leading to malabsorption, inability to nourish body as evidenced by 25 lb.weight loss reported with severe diarrhea    NUTRITION COMMENTS:    Mira is a 59 yo who is now feeling she is in good care with new PCP  Antoinette eBnson NP who is managing her ulcerative colitis.    Mira was in a flare from Feb-Sept  S/p colonoscopy led to cascade of illness, medication stopped working, couldn't keep anything down, lost 25 lbs  Severe diarrhea  In between MD's d/t retiring and not able to support her care, further " malnourishment     Mira has followed a low fiber diet for many years  [facility]. biopsies and confirmed dx seen in report from 2013    Has always bounced back after flares but this was debilitating for her  Read a lot and desires to know how to manage her UC optimally.    Supported Mira with IBD guidelines that will assist her in flare and non-flare periods of her chronic disease. Priority is to maintain her nutritional status that will carry her through flares and recovery.    Follow with MD.      RTC x  next available     Time Spent: 60 minutes

## 2023-10-30 NOTE — PATIENT INSTRUCTIONS
I will review IBD- UC guidelines  - note symptoms with re-introduction of foods with resolution of flare  - sample lactose free supplement for tolerance  - sample coconut water for natural electrolytes    I will add in more protein to my meals  - healing and repletion

## 2023-11-14 ENCOUNTER — HOSPITAL ENCOUNTER (OUTPATIENT)
Dept: LAB | Facility: MEDICAL CENTER | Age: 58
End: 2023-11-14
Attending: FAMILY MEDICINE
Payer: COMMERCIAL

## 2023-11-14 LAB
ALBUMIN SERPL BCP-MCNC: 3.9 G/DL (ref 3.2–4.9)
BASOPHILS # BLD AUTO: 0.6 % (ref 0–1.8)
BASOPHILS # BLD: 0.04 K/UL (ref 0–0.12)
BUN SERPL-MCNC: 17 MG/DL (ref 8–22)
CALCIUM ALBUM COR SERPL-MCNC: 9.6 MG/DL (ref 8.5–10.5)
CALCIUM SERPL-MCNC: 9.5 MG/DL (ref 8.4–10.2)
CHLORIDE SERPL-SCNC: 104 MMOL/L (ref 96–112)
CHOLEST SERPL-MCNC: 169 MG/DL (ref 100–199)
CO2 SERPL-SCNC: 25 MMOL/L (ref 20–33)
CREAT SERPL-MCNC: 0.58 MG/DL (ref 0.5–1.4)
EOSINOPHIL # BLD AUTO: 0.3 K/UL (ref 0–0.51)
EOSINOPHIL NFR BLD: 4.4 % (ref 0–6.9)
ERYTHROCYTE [DISTWIDTH] IN BLOOD BY AUTOMATED COUNT: 48.5 FL (ref 35.9–50)
EST. AVERAGE GLUCOSE BLD GHB EST-MCNC: 123 MG/DL
FASTING STATUS PATIENT QL REPORTED: NORMAL
GFR SERPLBLD CREATININE-BSD FMLA CKD-EPI: 103 ML/MIN/1.73 M 2
GLUCOSE SERPL-MCNC: 113 MG/DL (ref 65–99)
HBA1C MFR BLD: 5.9 % (ref 4–5.6)
HCT VFR BLD AUTO: 36.6 % (ref 37–47)
HDLC SERPL-MCNC: 50 MG/DL
HGB BLD-MCNC: 11.4 G/DL (ref 12–16)
IMM GRANULOCYTES # BLD AUTO: 0.01 K/UL (ref 0–0.11)
IMM GRANULOCYTES NFR BLD AUTO: 0.1 % (ref 0–0.9)
LDLC SERPL CALC-MCNC: 105 MG/DL
LYMPHOCYTES # BLD AUTO: 2.28 K/UL (ref 1–4.8)
LYMPHOCYTES NFR BLD: 33.1 % (ref 22–41)
MCH RBC QN AUTO: 27 PG (ref 27–33)
MCHC RBC AUTO-ENTMCNC: 31.1 G/DL (ref 32.2–35.5)
MCV RBC AUTO: 86.5 FL (ref 81.4–97.8)
MONOCYTES # BLD AUTO: 0.65 K/UL (ref 0–0.85)
MONOCYTES NFR BLD AUTO: 9.4 % (ref 0–13.4)
NEUTROPHILS # BLD AUTO: 3.61 K/UL (ref 1.82–7.42)
NEUTROPHILS NFR BLD: 52.4 % (ref 44–72)
NRBC # BLD AUTO: 0 K/UL
NRBC BLD-RTO: 0 /100 WBC (ref 0–0.2)
PHOSPHATE SERPL-MCNC: 3.3 MG/DL (ref 2.5–4.5)
PLATELET # BLD AUTO: 447 K/UL (ref 164–446)
PMV BLD AUTO: 8.6 FL (ref 9–12.9)
POTASSIUM SERPL-SCNC: 4.3 MMOL/L (ref 3.6–5.5)
RBC # BLD AUTO: 4.23 M/UL (ref 4.2–5.4)
SODIUM SERPL-SCNC: 139 MMOL/L (ref 135–145)
T4 FREE SERPL-MCNC: 1.49 NG/DL (ref 0.93–1.7)
TRIGL SERPL-MCNC: 69 MG/DL (ref 0–149)
TSH SERPL DL<=0.005 MIU/L-ACNC: 0.26 UIU/ML (ref 0.38–5.33)
WBC # BLD AUTO: 6.9 K/UL (ref 4.8–10.8)

## 2023-11-14 PROCEDURE — 85025 COMPLETE CBC W/AUTO DIFF WBC: CPT

## 2023-11-14 PROCEDURE — 83036 HEMOGLOBIN GLYCOSYLATED A1C: CPT

## 2023-11-14 PROCEDURE — 36415 COLL VENOUS BLD VENIPUNCTURE: CPT

## 2023-11-14 PROCEDURE — 80061 LIPID PANEL: CPT

## 2023-11-14 PROCEDURE — 84443 ASSAY THYROID STIM HORMONE: CPT

## 2023-11-14 PROCEDURE — 84439 ASSAY OF FREE THYROXINE: CPT

## 2023-11-14 PROCEDURE — 80069 RENAL FUNCTION PANEL: CPT

## 2024-01-08 ENCOUNTER — HOSPITAL ENCOUNTER (OUTPATIENT)
Facility: MEDICAL CENTER | Age: 59
End: 2024-01-08
Attending: NURSE PRACTITIONER
Payer: COMMERCIAL

## 2024-01-08 ENCOUNTER — HOSPITAL ENCOUNTER (OUTPATIENT)
Dept: LAB | Facility: MEDICAL CENTER | Age: 59
End: 2024-01-08
Attending: STUDENT IN AN ORGANIZED HEALTH CARE EDUCATION/TRAINING PROGRAM
Payer: COMMERCIAL

## 2024-01-08 LAB
ALBUMIN SERPL BCP-MCNC: 4.2 G/DL (ref 3.2–4.9)
ALBUMIN/GLOB SERPL: 1.3 G/DL
ALP SERPL-CCNC: 70 U/L (ref 30–99)
ALT SERPL-CCNC: 14 U/L (ref 2–50)
ANION GAP SERPL CALC-SCNC: 10 MMOL/L (ref 7–16)
AST SERPL-CCNC: 12 U/L (ref 12–45)
BASOPHILS # BLD AUTO: 0.3 % (ref 0–1.8)
BASOPHILS # BLD: 0.03 K/UL (ref 0–0.12)
BILIRUB SERPL-MCNC: 0.9 MG/DL (ref 0.1–1.5)
BUN SERPL-MCNC: 20 MG/DL (ref 8–22)
CALCIUM ALBUM COR SERPL-MCNC: 9.6 MG/DL (ref 8.5–10.5)
CALCIUM SERPL-MCNC: 9.8 MG/DL (ref 8.4–10.2)
CHLORIDE SERPL-SCNC: 101 MMOL/L (ref 96–112)
CO2 SERPL-SCNC: 28 MMOL/L (ref 20–33)
CREAT SERPL-MCNC: 0.64 MG/DL (ref 0.5–1.4)
CRP SERPL HS-MCNC: 0.42 MG/DL (ref 0–0.75)
EOSINOPHIL # BLD AUTO: 0.22 K/UL (ref 0–0.51)
EOSINOPHIL NFR BLD: 1.9 % (ref 0–6.9)
ERYTHROCYTE [DISTWIDTH] IN BLOOD BY AUTOMATED COUNT: 52.3 FL (ref 35.9–50)
ERYTHROCYTE [SEDIMENTATION RATE] IN BLOOD BY WESTERGREN METHOD: 26 MM/HOUR (ref 0–25)
GFR SERPLBLD CREATININE-BSD FMLA CKD-EPI: 101 ML/MIN/1.73 M 2
GLOBULIN SER CALC-MCNC: 3.2 G/DL (ref 1.9–3.5)
GLUCOSE SERPL-MCNC: 79 MG/DL (ref 65–99)
HCT VFR BLD AUTO: 36.2 % (ref 37–47)
HGB BLD-MCNC: 11.3 G/DL (ref 12–16)
IMM GRANULOCYTES # BLD AUTO: 0.06 K/UL (ref 0–0.11)
IMM GRANULOCYTES NFR BLD AUTO: 0.5 % (ref 0–0.9)
LYMPHOCYTES # BLD AUTO: 3.77 K/UL (ref 1–4.8)
LYMPHOCYTES NFR BLD: 33 % (ref 22–41)
MCH RBC QN AUTO: 27.2 PG (ref 27–33)
MCHC RBC AUTO-ENTMCNC: 31.2 G/DL (ref 32.2–35.5)
MCV RBC AUTO: 87.2 FL (ref 81.4–97.8)
MONOCYTES # BLD AUTO: 0.73 K/UL (ref 0–0.85)
MONOCYTES NFR BLD AUTO: 6.4 % (ref 0–13.4)
NEUTROPHILS # BLD AUTO: 6.61 K/UL (ref 1.82–7.42)
NEUTROPHILS NFR BLD: 57.9 % (ref 44–72)
NRBC # BLD AUTO: 0 K/UL
NRBC BLD-RTO: 0 /100 WBC (ref 0–0.2)
PLATELET # BLD AUTO: 385 K/UL (ref 164–446)
PMV BLD AUTO: 8.4 FL (ref 9–12.9)
POTASSIUM SERPL-SCNC: 4.4 MMOL/L (ref 3.6–5.5)
PROT SERPL-MCNC: 7.4 G/DL (ref 6–8.2)
RBC # BLD AUTO: 4.15 M/UL (ref 4.2–5.4)
SODIUM SERPL-SCNC: 139 MMOL/L (ref 135–145)
WBC # BLD AUTO: 11.4 K/UL (ref 4.8–10.8)

## 2024-01-08 PROCEDURE — 85652 RBC SED RATE AUTOMATED: CPT

## 2024-01-08 PROCEDURE — 80280 DRUG ASSAY VEDOLIZUMAB: CPT

## 2024-01-08 PROCEDURE — 85025 COMPLETE CBC W/AUTO DIFF WBC: CPT

## 2024-01-08 PROCEDURE — 87328 CRYPTOSPORIDIUM AG IA: CPT

## 2024-01-08 PROCEDURE — 83630 LACTOFERRIN FECAL (QUAL): CPT

## 2024-01-08 PROCEDURE — 80053 COMPREHEN METABOLIC PANEL: CPT

## 2024-01-08 PROCEDURE — 36415 COLL VENOUS BLD VENIPUNCTURE: CPT

## 2024-01-08 PROCEDURE — 86140 C-REACTIVE PROTEIN: CPT

## 2024-01-08 PROCEDURE — 87493 C DIFF AMPLIFIED PROBE: CPT

## 2024-01-08 PROCEDURE — 87899 AGENT NOS ASSAY W/OPTIC: CPT

## 2024-01-08 PROCEDURE — 87045 FECES CULTURE AEROBIC BACT: CPT

## 2024-01-08 PROCEDURE — 87329 GIARDIA AG IA: CPT

## 2024-01-08 PROCEDURE — 83993 ASSAY FOR CALPROTECTIN FECAL: CPT

## 2024-01-08 PROCEDURE — 87046 STOOL CULTR AEROBIC BACT EA: CPT

## 2024-01-08 PROCEDURE — 82397 CHEMILUMINESCENT ASSAY: CPT

## 2024-01-09 LAB
C DIFF DNA SPEC QL NAA+PROBE: NEGATIVE
C DIFF TOX GENS STL QL NAA+PROBE: NEGATIVE
G LAMBLIA+C PARVUM AG STL QL RAPID: NORMAL
LACTOFERRIN STL QL IA: POSITIVE
SIGNIFICANT IND 70042: NORMAL
SITE SITE: NORMAL
SOURCE SOURCE: NORMAL

## 2024-01-11 LAB — CALPROTECTIN STL-MCNT: 366 UG/G

## 2024-01-12 LAB
BACTERIA STL CULT: NORMAL
E COLI SXT1+2 STL IA: NORMAL
E COLI SXT1+2 STL IA: NORMAL
SIGNIFICANT IND 70042: NORMAL
SIGNIFICANT IND 70042: NORMAL
SITE SITE: NORMAL
SITE SITE: NORMAL
SOURCE SOURCE: NORMAL
SOURCE SOURCE: NORMAL

## 2024-01-19 LAB
CLINICAL BIOCHEMIST REVIEW: NORMAL
VEDOLIZUMAB AB SERPL IA-MCNC: <9.8 NG/ML
VEDOLIZUMAB TROUGH SERPL LC/MS/MS-MCNC: 20.1 MCG/ML

## 2024-01-29 ENCOUNTER — HOSPITAL ENCOUNTER (OUTPATIENT)
Dept: LAB | Facility: MEDICAL CENTER | Age: 59
End: 2024-01-29
Attending: FAMILY MEDICINE
Payer: COMMERCIAL

## 2024-01-29 LAB — TSH SERPL DL<=0.005 MIU/L-ACNC: 1.23 UIU/ML (ref 0.38–5.33)

## 2024-01-29 PROCEDURE — 36415 COLL VENOUS BLD VENIPUNCTURE: CPT

## 2024-01-29 PROCEDURE — 84443 ASSAY THYROID STIM HORMONE: CPT

## 2024-04-11 ENCOUNTER — HOSPITAL ENCOUNTER (OUTPATIENT)
Dept: LAB | Facility: MEDICAL CENTER | Age: 59
End: 2024-04-11
Attending: NURSE PRACTITIONER
Payer: COMMERCIAL

## 2024-04-11 LAB
25(OH)D3 SERPL-MCNC: 38 NG/ML (ref 30–100)
ALBUMIN SERPL BCP-MCNC: 4 G/DL (ref 3.2–4.9)
ALBUMIN/GLOB SERPL: 1.2 G/DL
ALP SERPL-CCNC: 72 U/L (ref 30–99)
ALT SERPL-CCNC: 7 U/L (ref 2–50)
ANION GAP SERPL CALC-SCNC: 9 MMOL/L (ref 7–16)
AST SERPL-CCNC: 13 U/L (ref 12–45)
BASOPHILS # BLD AUTO: 0.6 % (ref 0–1.8)
BASOPHILS # BLD: 0.04 K/UL (ref 0–0.12)
BILIRUB SERPL-MCNC: 0.5 MG/DL (ref 0.1–1.5)
BUN SERPL-MCNC: 18 MG/DL (ref 8–22)
CALCIUM ALBUM COR SERPL-MCNC: 9.5 MG/DL (ref 8.5–10.5)
CALCIUM SERPL-MCNC: 9.5 MG/DL (ref 8.4–10.2)
CHLORIDE SERPL-SCNC: 105 MMOL/L (ref 96–112)
CO2 SERPL-SCNC: 24 MMOL/L (ref 20–33)
CREAT SERPL-MCNC: 0.51 MG/DL (ref 0.5–1.4)
CRP SERPL HS-MCNC: 0.64 MG/DL (ref 0–0.75)
EOSINOPHIL # BLD AUTO: 0.26 K/UL (ref 0–0.51)
EOSINOPHIL NFR BLD: 4.1 % (ref 0–6.9)
ERYTHROCYTE [DISTWIDTH] IN BLOOD BY AUTOMATED COUNT: 42.5 FL (ref 35.9–50)
ERYTHROCYTE [SEDIMENTATION RATE] IN BLOOD BY WESTERGREN METHOD: 41 MM/HOUR (ref 0–25)
FASTING STATUS PATIENT QL REPORTED: NORMAL
GFR SERPLBLD CREATININE-BSD FMLA CKD-EPI: 106 ML/MIN/1.73 M 2
GLOBULIN SER CALC-MCNC: 3.3 G/DL (ref 1.9–3.5)
GLUCOSE SERPL-MCNC: 110 MG/DL (ref 65–99)
HBV SURFACE AG SER QL: NORMAL
HCT VFR BLD AUTO: 34.9 % (ref 37–47)
HGB BLD-MCNC: 11 G/DL (ref 12–16)
IMM GRANULOCYTES # BLD AUTO: 0.02 K/UL (ref 0–0.11)
IMM GRANULOCYTES NFR BLD AUTO: 0.3 % (ref 0–0.9)
LYMPHOCYTES # BLD AUTO: 2.35 K/UL (ref 1–4.8)
LYMPHOCYTES NFR BLD: 36.8 % (ref 22–41)
MCH RBC QN AUTO: 27.8 PG (ref 27–33)
MCHC RBC AUTO-ENTMCNC: 31.5 G/DL (ref 32.2–35.5)
MCV RBC AUTO: 88.1 FL (ref 81.4–97.8)
MONOCYTES # BLD AUTO: 0.55 K/UL (ref 0–0.85)
MONOCYTES NFR BLD AUTO: 8.6 % (ref 0–13.4)
NEUTROPHILS # BLD AUTO: 3.16 K/UL (ref 1.82–7.42)
NEUTROPHILS NFR BLD: 49.6 % (ref 44–72)
NRBC # BLD AUTO: 0 K/UL
NRBC BLD-RTO: 0 /100 WBC (ref 0–0.2)
PLATELET # BLD AUTO: 409 K/UL (ref 164–446)
PMV BLD AUTO: 8.8 FL (ref 9–12.9)
POTASSIUM SERPL-SCNC: 4.2 MMOL/L (ref 3.6–5.5)
PROT SERPL-MCNC: 7.3 G/DL (ref 6–8.2)
RBC # BLD AUTO: 3.96 M/UL (ref 4.2–5.4)
SODIUM SERPL-SCNC: 138 MMOL/L (ref 135–145)
WBC # BLD AUTO: 6.4 K/UL (ref 4.8–10.8)

## 2024-04-11 PROCEDURE — 80053 COMPREHEN METABOLIC PANEL: CPT

## 2024-04-11 PROCEDURE — 86480 TB TEST CELL IMMUN MEASURE: CPT

## 2024-04-11 PROCEDURE — 36415 COLL VENOUS BLD VENIPUNCTURE: CPT

## 2024-04-11 PROCEDURE — 87340 HEPATITIS B SURFACE AG IA: CPT

## 2024-04-11 PROCEDURE — 86140 C-REACTIVE PROTEIN: CPT

## 2024-04-11 PROCEDURE — 85652 RBC SED RATE AUTOMATED: CPT

## 2024-04-11 PROCEDURE — 85025 COMPLETE CBC W/AUTO DIFF WBC: CPT

## 2024-04-11 PROCEDURE — 82306 VITAMIN D 25 HYDROXY: CPT

## 2024-04-12 LAB
GAMMA INTERFERON BACKGROUND BLD IA-ACNC: 0.04 IU/ML
M TB IFN-G BLD-IMP: NEGATIVE
M TB IFN-G CD4+ BCKGRND COR BLD-ACNC: 0 IU/ML
MITOGEN IGNF BCKGRD COR BLD-ACNC: 4.7 IU/ML
QFT TB2 - NIL TBQ2: 0 IU/ML

## 2024-05-14 ENCOUNTER — HOSPITAL ENCOUNTER (OUTPATIENT)
Facility: MEDICAL CENTER | Age: 59
End: 2024-05-14
Attending: NURSE PRACTITIONER
Payer: COMMERCIAL

## 2024-05-17 LAB — CALPROTECTIN STL-MCNT: 1390 UG/G

## 2024-06-24 NOTE — PROGRESS NOTES
Date of Admission: 6/17/2024  Date of Discharge: 06/24/24    Final diagnosis:   R11.15 cyclical vomiting  R10.84 generalized abdominal pain  N17.9 acute kidney injury  I50.22 Chronic systolic (congestive) heart failure  I42.9 Cardiomyopathy, unspecified  Z72.0 Tobacco use  I48.2     Chronic atrial fibrillation       Past Medical History:   Diagnosis Date    Atrial fibrillation  (CMD)     Chronic kidney disease     Congestive cardiac failure  (CMD)     Essential (primary) hypertension     Fracture     neck - used Halo    Inguinal hernia, bilateral 10/2018    Otitis media     PAST MEDICAL HISTORY     chronic left abdominal pain    PONV (postoperative nausea and vomiting)     Snores     Tobacco abuse        Hospital course:     Abdominal pain   Vomiting     HPI  This is a 68-year-old male with past medical history significant for congestive heart failure with reduced ejection fraction 16%, nonischemic cardiomyopathy, atrial fibrillation on anticoagulation, status post ICD, was recently admitted to an outside hospital for evaluation of vomiting, was seen by GI, and was treated for constipation.  Presented to the ER last night for evaluation of vomiting, in the ER, labs and vitals were relatively stable, he was admitted to the floor for further management.     This admission the patient was given gentle hydration, Protonix, Carafate seen by Gastroenterology they plan EGD but did not pursue due to fragile cardiac status he had mild bradycardia beta-blocker was held but will be restarted he remains on Eliquis, Entresto and metoprolol.    He continues to have nausea vomiting for which he was given antiemetics and IV Dilaudid will be changed to p.o. Reglan and Percocet on discharge.    Further follow-up as outpatient.          Echo    Final Impressions    * Moderately increased left ventricular chamber size.    * Severely globally decreased left ventricular systolic function.    * Left ventricular ejection fraction; 21 %.     Subjective:      Mira Ramirez is a 53 y.o. female who presents with New Patient (Prolapse)        CC: prolapse    HPI: The patient is a 53-year-old  2 para 2-0-0-2 postmenopausal female who presents with complaints of her uterus falling out.  It has become more severe over the past 3 weeks.  She works in a grocery store as a  and feels her symptoms are worse because she is always on her feet.  She also has started to have vaginal bleeding the past 2 weeks.  Feels her cervix is sticking out she needs to shot it back in she reports difficulty urinating at times.  She has occasional leaking of urine but this is not a problem for her.  She states she has been menopausal for approximately 4 years.  She denies history of abnormal Pap smears or cervical dysplasia.  She reports her mother has history of ovarian cancer.                        ROS REVIEW OF SYSTEMS:    Pertinent positives and negatives mentioned in HPI.    All other systems reviewed and are negative or noncontributory.      Past Medical History:   Diagnosis Date   • Bleeding from the nose    • Blood transfusion without reported diagnosis    • Hay fever    • Hives    • Measles    • Mumps        Past Surgical History:   Procedure Laterality Date   • EYE SURGERY           Current Outpatient Prescriptions:   •  IRON PO, Take 1 Tab by mouth every day., Disp: , Rfl:   •  levothyroxine (SYNTHROID) 88 MCG Tab, Take 1 Tab by mouth Every morning on an empty stomach., Disp: 90 Tab, Rfl: 3  •  oxybutynin (DITROPAN) 5 MG Tab, Take 1 Tab by mouth 2 Times a Day., Disp: 60 Tab, Rfl: 11  •  azathioprine (IMURAN) 50 MG Tab, Take  mg by mouth 2 Times a Day. 50mg in AM, 100mg in PM, Disp: , Rfl:   •  Mesalamine (APRISO) 0.375 GM CAPSULE SR 24 HR, Take 1.5 g by mouth every morning., Disp: , Rfl:   •  calcium citrate 315 mg-vitamin D 200 mg (CALCIUM CITRATE +) 315-200 MG-UNIT per tablet, Take 1 Tab by mouth every day., Disp: , Rfl:     Sulfa drugs;  "Sulfasalazine; Latex; and Mesalamine    Family History   Problem Relation Age of Onset   • Arthritis Neg Hx    • Lung Disease Neg Hx    • Genetic Neg Hx    • Cancer Neg Hx    • Psychiatry Neg Hx    • Diabetes Neg Hx    • Heart Disease Neg Hx    • Hypertension Neg Hx    • Hyperlipidemia Neg Hx    • Stroke Neg Hx    • Alcohol/Drug Neg Hx        Social History     Social History   • Marital status:      Spouse name: N/A   • Number of children: N/A   • Years of education: N/A     Occupational History   • Not on file.     Social History Main Topics   • Smoking status: Never Smoker   • Smokeless tobacco: Never Used   • Alcohol use 0.0 oz/week      Comment: rare   • Drug use: No   • Sexual activity: Not Currently     Partners: Male     Other Topics Concern   • Not on file     Social History Narrative   • No narrative on file       Obstetric History       T2      L2     SAB0   TAB0   Ectopic0   Molar0   Multiple0   Live Births2               Objective:     /64 (BP Location: Left arm, Patient Position: Sitting)   Ht 1.575 m (5' 2\")   Wt 56.2 kg (124 lb)   LMP  (LMP Unknown)   Breastfeeding? No   BMI 22.68 kg/m²      Physical Exam      GENERAL: Alert, in no apparent distress  PSYCHIATRIC: Appropriate affect, intact insight and judgement.  NECK:  Nontender, no masses.  No Thyromegaly or nodules. No lymphadenopathy.  RESPIRATORY: Normal respiratory effort.  Lungs clear to auscultation.   CARDIOVASCULAR: RRR, no murmur, gallop, or rub.  ABDOMEN: Soft, nontender, nondistended.  No palpable masses.  No rebound or guarding.  No inguinal lymphadenopathy.  No hepatosplenomegaly.  No hernias.  BACK: No CVA tenderness  EXTREMITIES: No edema  SKIN: No rash    BREAST: No masses or tenderness.  No skin changes.  No nipple inversion or discharge. No axillary lymphadenopathy.      GENITOURINARY:  Normal external genitalia, no lesions.  Normal urethral meatus, no masses or tenderness.  Normal bladder without " * Grade II diastolic dysfunction of the left ventricle (pseudonormal filling  pattern).    * Mild-moderate mitral valve regurgitation.    * Left atrial volume index of 45 ml/m2.    * Normal IVC dimension with <50% respiratory change of the inferior vena  cava.    * Right ventricular systolic pressure; 32 mmHg.    * Mildly dilated ascending aorta.    * As compared to previous echocardiogram dated 04/17/2023 LVEF increased  from 17% to 21% in current, severe global hypokinesis noted.       CT abdomen    IMPRESSION:  1. 2.1 cm right adrenal nodule as well as some diffuse thickening of the  left adrenal gland.  2. Dilatation of the right renal collecting system unchanged and likely due  to congenital UPJ obstruction.  3. Nonobstructing calculi within the right kidney.  4. Renal cysts within the right kidney.  5. Diverticulosis involving the distal colon.  6. Prominent thickening of the bladder wall.  7. Bilateral pars defect at L5 with associated anterior listhesis.           Recent Labs   Lab 06/20/24  0628   WBC 10.6   RBC 5.01   HGB 16.5   HCT 48.1          Recent Labs   Lab 06/20/24  0628   SODIUM 137   POTASSIUM 3.5   CHLORIDE 102   CO2 29   BUN 13   CREATININE 0.93   GLUCOSE 107*   CALCIUM 9.7       Vitals:    06/23/24 0747 06/23/24 1611 06/23/24 2249 06/24/24 0812   BP: (!) 160/112 (!) 144/92 119/86 (!) 151/106   BP Location: RUE - Right upper extremity RUE - Right upper extremity LUE - Left upper extremity    Patient Position: Supine Supine Supine    Pulse: 99 76 95 71   Resp: 16 16 18 16   Temp: 97.5 °F (36.4 °C) 98.1 °F (36.7 °C) 98.2 °F (36.8 °C) 97.7 °F (36.5 °C)   TempSrc:   Oral    SpO2: 96% 100% 98% 99%   Weight:       Height:              Summary of your Discharge Medications        Take these Medications        Details   apixaBAN 5 MG Tab  Commonly known as: Eliquis   Take 1 tablet by mouth every 12 hours.     metoCLOPramide 5 MG tablet  Commonly known as: Reglan   Take 1 tablet by mouth 3 times  daily as needed for Nausea or Vomiting.     metoPROLOL succinate 25 MG 24 hr tablet  Commonly known as: TOPROL-XL   TAKE 1 TABLET BY MOUTH DAILY     Multiple Vitamin tablet   Take 1 tablet by mouth daily.     oxyCODONE-acetaminophen 5-325 MG per tablet  Commonly known as: Percocet   Take 1 tablet by mouth every 4 hours as needed for Pain.     potassium CHLORIDE 20 MEQ miky ER tablet  Commonly known as: KLOR-CON M   TAKE 1 TABLET BY MOUTH DAILY     psyllium 58.12 % packet for oral suspension  Commonly known as: METAMUCIL MULTIHEALTH FIBER   Take 1 packet by mouth daily as needed (constipation). Mix with water     sacubitril-valsartan 24-26 MG per tablet  Commonly known as: ENTRESTO   Take 0.5 tablets by mouth every morning AND 0.5 tablets every evening.     spironolactone 25 MG tablet  Commonly known as: ALDACTONE   TAKE 1 TABLET BY MOUTH DAILY  Comment: **Patient requests 90 days supply**ZERO refills remain on this prescription. Your patient is requesting advance approval of refills for this medication to PREVENT ANY MISSED DOSES     sucralfate 1 GM/10ML suspension  Commonly known as: CARAFATE   Take 10 mLs by mouth 4 times daily (before meals and nightly).     torsemide 20 MG tablet  Commonly known as: DEMADEX   TAKE 1 TABLET BY MOUTH DAILY  Comment: **Patient requests 90 days supply**                Exam is stable.       Cachorro Enriquez MD     fullness or masses.  Vagina well estrogenized, no vaginal discharge or lesions.  Cervix without lesions or discharge, nontender.  Descends 2 cm past introitus.   Uterus normal size, shape, and contour, nontender.  Adnexa nontender, no masses.  Normal anus and perineum.    Rectal Exam - not indicated.    7/10/19 - pelvic US  Uterus measures 4.02 x 6.98 x 3.98 cm.  A small fibroid is present 1.1 cm in the posterior uterine segment.  The endometrial echo measures 0.22 cm.  The right ovary measures 2.3 x 0.9 x 1.57 cm.  The left ovary measures 3.59 x 2.99 x 3.48 cm a left ovarian cyst measures approximately 3.3 cm x 2.6 cm x 3.0 cm no nodularity or  septations are appreciated.  There is a tiny calcification.     Assessment/Plan:     1. Third degree uterine prolapse  Third-degree uterine prolapse.  We discussed options of pessary versus hysterectomy.  Patient desires hysterectomy and BSO.  Due to the degree of prolapse, it is difficult to tell if she will require anterior posterior repair.  We will plan to perform Ohara's culdoplasty and possible ANP repair if indicated.    2. Post-menopausal bleeding  New onset of postmenopausal bleeding.  No blood was noted on exam today.  Suspect this is due to ulceration from the cervix hanging out of her vagina.  Will rule out endometrial hyperplasia with endometrial biopsy prior to surgery.  - REFERRAL TO GYNECOLOGY    3. Screening for breast cancer  - MA-SCREENING MAMMO BILAT W/TOMOSYNTHESIS W/CAD; Future    4. Screening for cervical cancer   - THINPREP PAP WITH HPV; Future    5. Screening for human papillomavirus (HPV)  - THINPREP PAP WITH HPV; Future    F/U for embx.

## 2024-07-26 ENCOUNTER — HOSPITAL ENCOUNTER (OUTPATIENT)
Dept: LAB | Facility: MEDICAL CENTER | Age: 59
End: 2024-07-26
Attending: NURSE PRACTITIONER
Payer: COMMERCIAL

## 2024-07-26 PROCEDURE — 82397 CHEMILUMINESCENT ASSAY: CPT | Mod: 91

## 2024-07-26 PROCEDURE — 80280 DRUG ASSAY VEDOLIZUMAB: CPT | Mod: 91

## 2024-07-26 PROCEDURE — 36415 COLL VENOUS BLD VENIPUNCTURE: CPT

## 2024-07-26 PROCEDURE — 82397 CHEMILUMINESCENT ASSAY: CPT

## 2024-07-26 PROCEDURE — 80280 DRUG ASSAY VEDOLIZUMAB: CPT

## 2024-08-01 LAB
CLINICAL BIOCHEMIST REVIEW: ABNORMAL
CLINICAL BIOCHEMIST REVIEW: ABNORMAL
VEDOLIZUMAB AB SERPL IA-MCNC: <9.8 NG/ML
VEDOLIZUMAB AB SERPL IA-MCNC: <9.8 NG/ML
VEDOLIZUMAB TROUGH SERPL LC/MS/MS-MCNC: 101 MCG/ML
VEDOLIZUMAB TROUGH SERPL LC/MS/MS-MCNC: 14.7 MCG/ML

## 2024-08-14 ENCOUNTER — HOSPITAL ENCOUNTER (OUTPATIENT)
Dept: LAB | Facility: MEDICAL CENTER | Age: 59
End: 2024-08-14
Attending: NURSE PRACTITIONER
Payer: COMMERCIAL

## 2024-08-14 LAB
ALBUMIN SERPL BCP-MCNC: 4 G/DL (ref 3.2–4.9)
ALBUMIN/GLOB SERPL: 1.1 G/DL
ALP SERPL-CCNC: 81 U/L (ref 30–99)
ALT SERPL-CCNC: 8 U/L (ref 2–50)
ANION GAP SERPL CALC-SCNC: 9 MMOL/L (ref 7–16)
AST SERPL-CCNC: 10 U/L (ref 12–45)
BASOPHILS # BLD AUTO: 0.7 % (ref 0–1.8)
BASOPHILS # BLD: 0.05 K/UL (ref 0–0.12)
BILIRUB SERPL-MCNC: 0.6 MG/DL (ref 0.1–1.5)
BUN SERPL-MCNC: 16 MG/DL (ref 8–22)
CALCIUM ALBUM COR SERPL-MCNC: 9.6 MG/DL (ref 8.5–10.5)
CALCIUM SERPL-MCNC: 9.6 MG/DL (ref 8.4–10.2)
CHLORIDE SERPL-SCNC: 104 MMOL/L (ref 96–112)
CO2 SERPL-SCNC: 28 MMOL/L (ref 20–33)
CREAT SERPL-MCNC: 0.53 MG/DL (ref 0.5–1.4)
EOSINOPHIL # BLD AUTO: 0.18 K/UL (ref 0–0.51)
EOSINOPHIL NFR BLD: 2.3 % (ref 0–6.9)
ERYTHROCYTE [DISTWIDTH] IN BLOOD BY AUTOMATED COUNT: 45.2 FL (ref 35.9–50)
FERRITIN SERPL-MCNC: 28 NG/ML (ref 10–291)
GFR SERPLBLD CREATININE-BSD FMLA CKD-EPI: 105 ML/MIN/1.73 M 2
GLOBULIN SER CALC-MCNC: 3.6 G/DL (ref 1.9–3.5)
GLUCOSE SERPL-MCNC: 108 MG/DL (ref 65–99)
HCT VFR BLD AUTO: 35.6 % (ref 37–47)
HGB BLD-MCNC: 11.2 G/DL (ref 12–16)
IMM GRANULOCYTES # BLD AUTO: 0.02 K/UL (ref 0–0.11)
IMM GRANULOCYTES NFR BLD AUTO: 0.3 % (ref 0–0.9)
IRON SATN MFR SERPL: 8 % (ref 15–55)
IRON SERPL-MCNC: 27 UG/DL (ref 40–170)
LYMPHOCYTES # BLD AUTO: 2.21 K/UL (ref 1–4.8)
LYMPHOCYTES NFR BLD: 28.8 % (ref 22–41)
MCH RBC QN AUTO: 27.9 PG (ref 27–33)
MCHC RBC AUTO-ENTMCNC: 31.5 G/DL (ref 32.2–35.5)
MCV RBC AUTO: 88.8 FL (ref 81.4–97.8)
MONOCYTES # BLD AUTO: 0.46 K/UL (ref 0–0.85)
MONOCYTES NFR BLD AUTO: 6 % (ref 0–13.4)
NEUTROPHILS # BLD AUTO: 4.75 K/UL (ref 1.82–7.42)
NEUTROPHILS NFR BLD: 61.9 % (ref 44–72)
NRBC # BLD AUTO: 0 K/UL
NRBC BLD-RTO: 0 /100 WBC (ref 0–0.2)
PLATELET # BLD AUTO: 430 K/UL (ref 164–446)
PMV BLD AUTO: 8.8 FL (ref 9–12.9)
POTASSIUM SERPL-SCNC: 4.9 MMOL/L (ref 3.6–5.5)
PROT SERPL-MCNC: 7.6 G/DL (ref 6–8.2)
RBC # BLD AUTO: 4.01 M/UL (ref 4.2–5.4)
SODIUM SERPL-SCNC: 141 MMOL/L (ref 135–145)
T4 FREE SERPL-MCNC: 1.59 NG/DL (ref 0.93–1.7)
TIBC SERPL-MCNC: 331 UG/DL (ref 250–450)
TSH SERPL DL<=0.005 MIU/L-ACNC: 0.2 UIU/ML (ref 0.38–5.33)
UIBC SERPL-MCNC: 304 UG/DL (ref 110–370)
WBC # BLD AUTO: 7.7 K/UL (ref 4.8–10.8)

## 2024-08-14 PROCEDURE — 84439 ASSAY OF FREE THYROXINE: CPT

## 2024-08-14 PROCEDURE — 84443 ASSAY THYROID STIM HORMONE: CPT

## 2024-08-14 PROCEDURE — 83540 ASSAY OF IRON: CPT

## 2024-08-14 PROCEDURE — 83550 IRON BINDING TEST: CPT

## 2024-08-14 PROCEDURE — 82728 ASSAY OF FERRITIN: CPT

## 2024-08-14 PROCEDURE — 80053 COMPREHEN METABOLIC PANEL: CPT

## 2024-08-14 PROCEDURE — 36415 COLL VENOUS BLD VENIPUNCTURE: CPT

## 2024-08-14 PROCEDURE — 85025 COMPLETE CBC W/AUTO DIFF WBC: CPT

## 2024-08-15 ENCOUNTER — HOSPITAL ENCOUNTER (OUTPATIENT)
Facility: MEDICAL CENTER | Age: 59
End: 2024-08-15
Attending: NURSE PRACTITIONER
Payer: COMMERCIAL

## 2024-08-15 LAB
ADV 40+41 DNA STL QL NAA+NON-PROBE: NOT DETECTED
ASTRO TYP 1-8 RNA STL QL NAA+NON-PROBE: NOT DETECTED
C CAYETANENSIS DNA STL QL NAA+NON-PROBE: NOT DETECTED
C COLI+JEJ+UPSA DNA STL QL NAA+NON-PROBE: NOT DETECTED
C DIFF DNA SPEC QL NAA+PROBE: NEGATIVE
C DIFF TOX GENS STL QL NAA+PROBE: NEGATIVE
CRYPTOSP DNA STL QL NAA+NON-PROBE: NOT DETECTED
E HISTOLYT DNA STL QL NAA+NON-PROBE: NOT DETECTED
EAEC PAA PLAS AGGR+AATA ST NAA+NON-PRB: NOT DETECTED
EC STX1+STX2 GENES STL QL NAA+NON-PROBE: NOT DETECTED
EPEC EAE GENE STL QL NAA+NON-PROBE: NOT DETECTED
ETEC LTA+ST1A+ST1B TOX ST NAA+NON-PROBE: NOT DETECTED
G LAMBLIA DNA STL QL NAA+NON-PROBE: NOT DETECTED
NOROVIRUS GI+II RNA STL QL NAA+NON-PROBE: NOT DETECTED
P SHIGELLOIDES DNA STL QL NAA+NON-PROBE: NOT DETECTED
RVA RNA STL QL NAA+NON-PROBE: NOT DETECTED
S ENT+BONG DNA STL QL NAA+NON-PROBE: NOT DETECTED
SAPO I+II+IV+V RNA STL QL NAA+NON-PROBE: NOT DETECTED
SHIGELLA SP+EIEC IPAH ST NAA+NON-PROBE: NOT DETECTED
V CHOL+PARA+VUL DNA STL QL NAA+NON-PROBE: NOT DETECTED
V CHOLERAE DNA STL QL NAA+NON-PROBE: NOT DETECTED
Y ENTEROCOL DNA STL QL NAA+NON-PROBE: NOT DETECTED

## 2024-08-15 PROCEDURE — 87507 IADNA-DNA/RNA PROBE TQ 12-25: CPT

## 2024-08-15 PROCEDURE — 83993 ASSAY FOR CALPROTECTIN FECAL: CPT

## 2024-08-15 PROCEDURE — 87493 C DIFF AMPLIFIED PROBE: CPT

## 2024-08-19 LAB — CALPROTECTIN STL-MCNT: >3000 UG/G

## 2024-09-26 ENCOUNTER — HOSPITAL ENCOUNTER (OUTPATIENT)
Dept: LAB | Facility: MEDICAL CENTER | Age: 59
End: 2024-09-26
Attending: FAMILY MEDICINE
Payer: COMMERCIAL

## 2024-09-26 LAB
ALBUMIN SERPL BCP-MCNC: 4.1 G/DL (ref 3.2–4.9)
ALBUMIN/GLOB SERPL: 1.2 G/DL
ALP SERPL-CCNC: 89 U/L (ref 30–99)
ALT SERPL-CCNC: 12 U/L (ref 2–50)
ANION GAP SERPL CALC-SCNC: 9 MMOL/L (ref 7–16)
AST SERPL-CCNC: 16 U/L (ref 12–45)
BASOPHILS # BLD AUTO: 0.8 % (ref 0–1.8)
BASOPHILS # BLD: 0.06 K/UL (ref 0–0.12)
BILIRUB SERPL-MCNC: 0.7 MG/DL (ref 0.1–1.5)
BUN SERPL-MCNC: 20 MG/DL (ref 8–22)
CALCIUM ALBUM COR SERPL-MCNC: 9.4 MG/DL (ref 8.5–10.5)
CALCIUM SERPL-MCNC: 9.5 MG/DL (ref 8.4–10.2)
CHLORIDE SERPL-SCNC: 101 MMOL/L (ref 96–112)
CHOLEST SERPL-MCNC: 178 MG/DL (ref 100–199)
CO2 SERPL-SCNC: 27 MMOL/L (ref 20–33)
CREAT SERPL-MCNC: 0.58 MG/DL (ref 0.5–1.4)
EOSINOPHIL # BLD AUTO: 0.17 K/UL (ref 0–0.51)
EOSINOPHIL NFR BLD: 2.2 % (ref 0–6.9)
ERYTHROCYTE [DISTWIDTH] IN BLOOD BY AUTOMATED COUNT: 44 FL (ref 35.9–50)
EST. AVERAGE GLUCOSE BLD GHB EST-MCNC: 120 MG/DL
FASTING STATUS PATIENT QL REPORTED: NORMAL
GFR SERPLBLD CREATININE-BSD FMLA CKD-EPI: 103 ML/MIN/1.73 M 2
GLOBULIN SER CALC-MCNC: 3.4 G/DL (ref 1.9–3.5)
GLUCOSE SERPL-MCNC: 86 MG/DL (ref 65–99)
HBA1C MFR BLD: 5.8 % (ref 4–5.6)
HCT VFR BLD AUTO: 36.2 % (ref 37–47)
HDLC SERPL-MCNC: 44 MG/DL
HGB BLD-MCNC: 11.3 G/DL (ref 12–16)
IMM GRANULOCYTES # BLD AUTO: 0.01 K/UL (ref 0–0.11)
IMM GRANULOCYTES NFR BLD AUTO: 0.1 % (ref 0–0.9)
LDLC SERPL CALC-MCNC: 91 MG/DL
LYMPHOCYTES # BLD AUTO: 2.08 K/UL (ref 1–4.8)
LYMPHOCYTES NFR BLD: 26.8 % (ref 22–41)
MCH RBC QN AUTO: 28 PG (ref 27–33)
MCHC RBC AUTO-ENTMCNC: 31.2 G/DL (ref 32.2–35.5)
MCV RBC AUTO: 89.8 FL (ref 81.4–97.8)
MONOCYTES # BLD AUTO: 0.6 K/UL (ref 0–0.85)
MONOCYTES NFR BLD AUTO: 7.7 % (ref 0–13.4)
NEUTROPHILS # BLD AUTO: 4.84 K/UL (ref 1.82–7.42)
NEUTROPHILS NFR BLD: 62.4 % (ref 44–72)
NRBC # BLD AUTO: 0 K/UL
NRBC BLD-RTO: 0 /100 WBC (ref 0–0.2)
PLATELET # BLD AUTO: 391 K/UL (ref 164–446)
PMV BLD AUTO: 8.7 FL (ref 9–12.9)
POTASSIUM SERPL-SCNC: 4.9 MMOL/L (ref 3.6–5.5)
PROT SERPL-MCNC: 7.5 G/DL (ref 6–8.2)
RBC # BLD AUTO: 4.03 M/UL (ref 4.2–5.4)
SODIUM SERPL-SCNC: 137 MMOL/L (ref 135–145)
T4 FREE SERPL-MCNC: 1.51 NG/DL (ref 0.93–1.7)
TRIGL SERPL-MCNC: 217 MG/DL (ref 0–149)
TSH SERPL DL<=0.005 MIU/L-ACNC: 0.18 UIU/ML (ref 0.38–5.33)
WBC # BLD AUTO: 7.8 K/UL (ref 4.8–10.8)

## 2024-09-26 PROCEDURE — 83036 HEMOGLOBIN GLYCOSYLATED A1C: CPT

## 2024-09-26 PROCEDURE — 84443 ASSAY THYROID STIM HORMONE: CPT

## 2024-09-26 PROCEDURE — 36415 COLL VENOUS BLD VENIPUNCTURE: CPT

## 2024-09-26 PROCEDURE — 84439 ASSAY OF FREE THYROXINE: CPT

## 2024-09-26 PROCEDURE — 85025 COMPLETE CBC W/AUTO DIFF WBC: CPT

## 2024-09-26 PROCEDURE — 80053 COMPREHEN METABOLIC PANEL: CPT

## 2024-09-26 PROCEDURE — 80061 LIPID PANEL: CPT

## 2025-01-16 ENCOUNTER — ANESTHESIA EVENT (OUTPATIENT)
Dept: SURGERY | Facility: MEDICAL CENTER | Age: 60
DRG: 397 | End: 2025-01-16
Payer: COMMERCIAL

## 2025-01-16 ENCOUNTER — HOSPITAL ENCOUNTER (INPATIENT)
Facility: MEDICAL CENTER | Age: 60
LOS: 5 days | DRG: 397 | End: 2025-01-21
Attending: STUDENT IN AN ORGANIZED HEALTH CARE EDUCATION/TRAINING PROGRAM | Admitting: STUDENT IN AN ORGANIZED HEALTH CARE EDUCATION/TRAINING PROGRAM
Payer: COMMERCIAL

## 2025-01-16 ENCOUNTER — ANESTHESIA (OUTPATIENT)
Dept: SURGERY | Facility: MEDICAL CENTER | Age: 60
DRG: 397 | End: 2025-01-16
Payer: COMMERCIAL

## 2025-01-16 ENCOUNTER — APPOINTMENT (OUTPATIENT)
Dept: RADIOLOGY | Facility: MEDICAL CENTER | Age: 60
DRG: 397 | End: 2025-01-16
Attending: STUDENT IN AN ORGANIZED HEALTH CARE EDUCATION/TRAINING PROGRAM
Payer: COMMERCIAL

## 2025-01-16 ENCOUNTER — APPOINTMENT (OUTPATIENT)
Dept: RADIOLOGY | Facility: MEDICAL CENTER | Age: 60
DRG: 397 | End: 2025-01-16
Payer: COMMERCIAL

## 2025-01-16 DIAGNOSIS — K35.80 ACUTE APPENDICITIS, UNSPECIFIED ACUTE APPENDICITIS TYPE: ICD-10-CM

## 2025-01-16 DIAGNOSIS — R00.1 BRADYCARDIA: ICD-10-CM

## 2025-01-16 PROBLEM — I95.9 HYPOTENSION: Status: ACTIVE | Noted: 2025-01-16

## 2025-01-16 PROBLEM — R10.9 ABDOMINAL PAIN: Status: ACTIVE | Noted: 2025-01-16

## 2025-01-16 LAB
ALBUMIN SERPL BCP-MCNC: 4.1 G/DL (ref 3.2–4.9)
ALBUMIN/GLOB SERPL: 1.1 G/DL
ALP SERPL-CCNC: 73 U/L (ref 30–99)
ALT SERPL-CCNC: 12 U/L (ref 2–50)
ANION GAP SERPL CALC-SCNC: 13 MMOL/L (ref 7–16)
APPEARANCE UR: CLEAR
AST SERPL-CCNC: 24 U/L (ref 12–45)
BACTERIA #/AREA URNS HPF: ABNORMAL /HPF
BASOPHILS # BLD AUTO: 0.1 % (ref 0–1.8)
BASOPHILS # BLD: 0.01 K/UL (ref 0–0.12)
BILIRUB SERPL-MCNC: 0.7 MG/DL (ref 0.1–1.5)
BILIRUB UR QL STRIP.AUTO: NEGATIVE
BUN SERPL-MCNC: 14 MG/DL (ref 8–22)
CALCIUM ALBUM COR SERPL-MCNC: 9.6 MG/DL (ref 8.5–10.5)
CALCIUM SERPL-MCNC: 9.7 MG/DL (ref 8.5–10.5)
CASTS URNS QL MICRO: ABNORMAL /LPF (ref 0–2)
CHLORIDE SERPL-SCNC: 99 MMOL/L (ref 96–112)
CO2 SERPL-SCNC: 22 MMOL/L (ref 20–33)
COLOR UR: YELLOW
CORTIS SERPL-MCNC: 10.9 UG/DL (ref 0–23)
CORTIS SERPL-MCNC: 35.4 UG/DL (ref 0–23)
CREAT SERPL-MCNC: 0.78 MG/DL (ref 0.5–1.4)
EKG IMPRESSION: NORMAL
EKG IMPRESSION: NORMAL
EOSINOPHIL # BLD AUTO: 0 K/UL (ref 0–0.51)
EOSINOPHIL NFR BLD: 0 % (ref 0–6.9)
EPITHELIAL CELLS 1715: ABNORMAL /HPF (ref 0–5)
ERYTHROCYTE [DISTWIDTH] IN BLOOD BY AUTOMATED COUNT: 47.9 FL (ref 35.9–50)
ERYTHROCYTE [DISTWIDTH] IN BLOOD BY AUTOMATED COUNT: 48.3 FL (ref 35.9–50)
ERYTHROCYTE [DISTWIDTH] IN BLOOD BY AUTOMATED COUNT: 51.2 FL (ref 35.9–50)
GFR SERPLBLD CREATININE-BSD FMLA CKD-EPI: 86 ML/MIN/1.73 M 2
GLOBULIN SER CALC-MCNC: 3.6 G/DL (ref 1.9–3.5)
GLUCOSE SERPL-MCNC: 130 MG/DL (ref 65–99)
GLUCOSE UR STRIP.AUTO-MCNC: NEGATIVE MG/DL
HCT VFR BLD AUTO: 28.6 % (ref 37–47)
HCT VFR BLD AUTO: 31.3 % (ref 37–47)
HCT VFR BLD AUTO: 39.2 % (ref 37–47)
HGB BLD-MCNC: 10.3 G/DL (ref 12–16)
HGB BLD-MCNC: 12.7 G/DL (ref 12–16)
HGB BLD-MCNC: 9 G/DL (ref 12–16)
IMM GRANULOCYTES # BLD AUTO: 0.03 K/UL (ref 0–0.11)
IMM GRANULOCYTES NFR BLD AUTO: 0.3 % (ref 0–0.9)
KETONES UR STRIP.AUTO-MCNC: 80 MG/DL
LACTATE SERPL-SCNC: 3.8 MMOL/L (ref 0.5–2)
LEUKOCYTE ESTERASE UR QL STRIP.AUTO: NEGATIVE
LIPASE SERPL-CCNC: 28 U/L (ref 11–82)
LYMPHOCYTES # BLD AUTO: 1.11 K/UL (ref 1–4.8)
LYMPHOCYTES NFR BLD: 12.6 % (ref 22–41)
MCH RBC QN AUTO: 28.9 PG (ref 27–33)
MCH RBC QN AUTO: 29.1 PG (ref 27–33)
MCH RBC QN AUTO: 29.3 PG (ref 27–33)
MCHC RBC AUTO-ENTMCNC: 31.5 G/DL (ref 32.2–35.5)
MCHC RBC AUTO-ENTMCNC: 32.4 G/DL (ref 32.2–35.5)
MCHC RBC AUTO-ENTMCNC: 32.9 G/DL (ref 32.2–35.5)
MCV RBC AUTO: 87.9 FL (ref 81.4–97.8)
MCV RBC AUTO: 89.7 FL (ref 81.4–97.8)
MCV RBC AUTO: 93.2 FL (ref 81.4–97.8)
MICRO URNS: ABNORMAL
MONOCYTES # BLD AUTO: 0.5 K/UL (ref 0–0.85)
MONOCYTES NFR BLD AUTO: 5.7 % (ref 0–13.4)
NEUTROPHILS # BLD AUTO: 7.14 K/UL (ref 1.82–7.42)
NEUTROPHILS NFR BLD: 81.3 % (ref 44–72)
NITRITE UR QL STRIP.AUTO: NEGATIVE
NRBC # BLD AUTO: 0 K/UL
NRBC BLD-RTO: 0 /100 WBC (ref 0–0.2)
PATHOLOGY CONSULT NOTE: NORMAL
PH UR STRIP.AUTO: 5.5 [PH] (ref 5–8)
PLATELET # BLD AUTO: 186 K/UL (ref 164–446)
PLATELET # BLD AUTO: 225 K/UL (ref 164–446)
PLATELET # BLD AUTO: 307 K/UL (ref 164–446)
PMV BLD AUTO: 8.8 FL (ref 9–12.9)
PMV BLD AUTO: 9 FL (ref 9–12.9)
PMV BLD AUTO: 9.4 FL (ref 9–12.9)
POTASSIUM SERPL-SCNC: 3.8 MMOL/L (ref 3.6–5.5)
PROT SERPL-MCNC: 7.7 G/DL (ref 6–8.2)
PROT UR QL STRIP: 30 MG/DL
RBC # BLD AUTO: 3.07 M/UL (ref 4.2–5.4)
RBC # BLD AUTO: 3.56 M/UL (ref 4.2–5.4)
RBC # BLD AUTO: 4.37 M/UL (ref 4.2–5.4)
RBC # URNS HPF: ABNORMAL /HPF (ref 0–2)
RBC UR QL AUTO: ABNORMAL
SODIUM SERPL-SCNC: 134 MMOL/L (ref 135–145)
SP GR UR STRIP.AUTO: 1.03
UROBILINOGEN UR STRIP.AUTO-MCNC: 1 EU/DL
WBC # BLD AUTO: 7 K/UL (ref 4.8–10.8)
WBC # BLD AUTO: 8.1 K/UL (ref 4.8–10.8)
WBC # BLD AUTO: 8.8 K/UL (ref 4.8–10.8)
WBC #/AREA URNS HPF: ABNORMAL /HPF

## 2025-01-16 PROCEDURE — 36415 COLL VENOUS BLD VENIPUNCTURE: CPT

## 2025-01-16 PROCEDURE — 700105 HCHG RX REV CODE 258: Performed by: NURSE PRACTITIONER

## 2025-01-16 PROCEDURE — 700102 HCHG RX REV CODE 250 W/ 637 OVERRIDE(OP): Performed by: STUDENT IN AN ORGANIZED HEALTH CARE EDUCATION/TRAINING PROGRAM

## 2025-01-16 PROCEDURE — 44970 LAPAROSCOPY APPENDECTOMY: CPT | Performed by: STUDENT IN AN ORGANIZED HEALTH CARE EDUCATION/TRAINING PROGRAM

## 2025-01-16 PROCEDURE — 76705 ECHO EXAM OF ABDOMEN: CPT

## 2025-01-16 PROCEDURE — 93005 ELECTROCARDIOGRAM TRACING: CPT | Mod: TC | Performed by: NURSE PRACTITIONER

## 2025-01-16 PROCEDURE — 700111 HCHG RX REV CODE 636 W/ 250 OVERRIDE (IP): Mod: JZ | Performed by: STUDENT IN AN ORGANIZED HEALTH CARE EDUCATION/TRAINING PROGRAM

## 2025-01-16 PROCEDURE — 99223 1ST HOSP IP/OBS HIGH 75: CPT | Mod: 57 | Performed by: STUDENT IN AN ORGANIZED HEALTH CARE EDUCATION/TRAINING PROGRAM

## 2025-01-16 PROCEDURE — 81001 URINALYSIS AUTO W/SCOPE: CPT

## 2025-01-16 PROCEDURE — 83605 ASSAY OF LACTIC ACID: CPT | Mod: 91

## 2025-01-16 PROCEDURE — 85025 COMPLETE CBC W/AUTO DIFF WBC: CPT

## 2025-01-16 PROCEDURE — A9270 NON-COVERED ITEM OR SERVICE: HCPCS | Performed by: STUDENT IN AN ORGANIZED HEALTH CARE EDUCATION/TRAINING PROGRAM

## 2025-01-16 PROCEDURE — 82533 TOTAL CORTISOL: CPT

## 2025-01-16 PROCEDURE — 83690 ASSAY OF LIPASE: CPT

## 2025-01-16 PROCEDURE — 700117 HCHG RX CONTRAST REV CODE 255: Performed by: STUDENT IN AN ORGANIZED HEALTH CARE EDUCATION/TRAINING PROGRAM

## 2025-01-16 PROCEDURE — 160029 HCHG SURGERY MINUTES - 1ST 30 MINS LEVEL 4: Performed by: STUDENT IN AN ORGANIZED HEALTH CARE EDUCATION/TRAINING PROGRAM

## 2025-01-16 PROCEDURE — 160036 HCHG PACU - EA ADDL 30 MINS PHASE I: Performed by: STUDENT IN AN ORGANIZED HEALTH CARE EDUCATION/TRAINING PROGRAM

## 2025-01-16 PROCEDURE — 36620 INSERTION CATHETER ARTERY: CPT

## 2025-01-16 PROCEDURE — 96365 THER/PROPH/DIAG IV INF INIT: CPT

## 2025-01-16 PROCEDURE — 74177 CT ABD & PELVIS W/CONTRAST: CPT

## 2025-01-16 PROCEDURE — 0DTJ4ZZ RESECTION OF APPENDIX, PERCUTANEOUS ENDOSCOPIC APPROACH: ICD-10-PCS | Performed by: STUDENT IN AN ORGANIZED HEALTH CARE EDUCATION/TRAINING PROGRAM

## 2025-01-16 PROCEDURE — 770022 HCHG ROOM/CARE - ICU (200)

## 2025-01-16 PROCEDURE — 160002 HCHG RECOVERY MINUTES (STAT): Performed by: STUDENT IN AN ORGANIZED HEALTH CARE EDUCATION/TRAINING PROGRAM

## 2025-01-16 PROCEDURE — 700111 HCHG RX REV CODE 636 W/ 250 OVERRIDE (IP): Mod: JZ | Performed by: NURSE PRACTITIONER

## 2025-01-16 PROCEDURE — 700105 HCHG RX REV CODE 258: Performed by: STUDENT IN AN ORGANIZED HEALTH CARE EDUCATION/TRAINING PROGRAM

## 2025-01-16 PROCEDURE — 160048 HCHG OR STATISTICAL LEVEL 1-5: Performed by: STUDENT IN AN ORGANIZED HEALTH CARE EDUCATION/TRAINING PROGRAM

## 2025-01-16 PROCEDURE — 160041 HCHG SURGERY MINUTES - EA ADDL 1 MIN LEVEL 4: Performed by: STUDENT IN AN ORGANIZED HEALTH CARE EDUCATION/TRAINING PROGRAM

## 2025-01-16 PROCEDURE — 160035 HCHG PACU - 1ST 60 MINS PHASE I: Performed by: STUDENT IN AN ORGANIZED HEALTH CARE EDUCATION/TRAINING PROGRAM

## 2025-01-16 PROCEDURE — 93005 ELECTROCARDIOGRAM TRACING: CPT | Mod: TC | Performed by: STUDENT IN AN ORGANIZED HEALTH CARE EDUCATION/TRAINING PROGRAM

## 2025-01-16 PROCEDURE — 85027 COMPLETE CBC AUTOMATED: CPT

## 2025-01-16 PROCEDURE — 99291 CRITICAL CARE FIRST HOUR: CPT

## 2025-01-16 PROCEDURE — 88304 TISSUE EXAM BY PATHOLOGIST: CPT | Performed by: PATHOLOGY

## 2025-01-16 PROCEDURE — 96375 TX/PRO/DX INJ NEW DRUG ADDON: CPT

## 2025-01-16 PROCEDURE — 160009 HCHG ANES TIME/MIN: Performed by: STUDENT IN AN ORGANIZED HEALTH CARE EDUCATION/TRAINING PROGRAM

## 2025-01-16 PROCEDURE — 700101 HCHG RX REV CODE 250: Performed by: STUDENT IN AN ORGANIZED HEALTH CARE EDUCATION/TRAINING PROGRAM

## 2025-01-16 PROCEDURE — 80053 COMPREHEN METABOLIC PANEL: CPT

## 2025-01-16 RX ORDER — SODIUM CHLORIDE, SODIUM LACTATE, POTASSIUM CHLORIDE, CALCIUM CHLORIDE 600; 310; 30; 20 MG/100ML; MG/100ML; MG/100ML; MG/100ML
INJECTION, SOLUTION INTRAVENOUS CONTINUOUS
Status: DISCONTINUED | OUTPATIENT
Start: 2025-01-16 | End: 2025-01-16

## 2025-01-16 RX ORDER — SODIUM PHOSPHATE,MONO-DIBASIC 19G-7G/118
1 ENEMA (ML) RECTAL
Status: DISCONTINUED | OUTPATIENT
Start: 2025-01-16 | End: 2025-01-21 | Stop reason: HOSPADM

## 2025-01-16 RX ORDER — MORPHINE SULFATE 4 MG/ML
4 INJECTION INTRAVENOUS ONCE
Status: COMPLETED | OUTPATIENT
Start: 2025-01-16 | End: 2025-01-16

## 2025-01-16 RX ORDER — KETOROLAC TROMETHAMINE 15 MG/ML
INJECTION, SOLUTION INTRAMUSCULAR; INTRAVENOUS PRN
Status: DISCONTINUED | OUTPATIENT
Start: 2025-01-16 | End: 2025-01-16 | Stop reason: SURG

## 2025-01-16 RX ORDER — AMOXICILLIN 250 MG
1 CAPSULE ORAL NIGHTLY
Status: DISCONTINUED | OUTPATIENT
Start: 2025-01-16 | End: 2025-01-21 | Stop reason: HOSPADM

## 2025-01-16 RX ORDER — OXYCODONE HYDROCHLORIDE 5 MG/1
5 TABLET ORAL
Status: DISCONTINUED | OUTPATIENT
Start: 2025-01-16 | End: 2025-01-21 | Stop reason: HOSPADM

## 2025-01-16 RX ORDER — ACETAMINOPHEN 500 MG
1000 TABLET ORAL EVERY 6 HOURS PRN
Status: DISCONTINUED | OUTPATIENT
Start: 2025-01-21 | End: 2025-01-21 | Stop reason: HOSPADM

## 2025-01-16 RX ORDER — ONDANSETRON 2 MG/ML
INJECTION INTRAMUSCULAR; INTRAVENOUS PRN
Status: DISCONTINUED | OUTPATIENT
Start: 2025-01-16 | End: 2025-01-16 | Stop reason: SURG

## 2025-01-16 RX ORDER — METRONIDAZOLE 500 MG/100ML
500 INJECTION, SOLUTION INTRAVENOUS ONCE
Status: DISCONTINUED | OUTPATIENT
Start: 2025-01-16 | End: 2025-01-16

## 2025-01-16 RX ORDER — LIDOCAINE HYDROCHLORIDE 20 MG/ML
JELLY TOPICAL PRN
Status: DISCONTINUED | OUTPATIENT
Start: 2025-01-16 | End: 2025-01-16 | Stop reason: SURG

## 2025-01-16 RX ORDER — DEXAMETHASONE SODIUM PHOSPHATE 4 MG/ML
INJECTION, SOLUTION INTRA-ARTICULAR; INTRALESIONAL; INTRAMUSCULAR; INTRAVENOUS; SOFT TISSUE PRN
Status: DISCONTINUED | OUTPATIENT
Start: 2025-01-16 | End: 2025-01-16 | Stop reason: SURG

## 2025-01-16 RX ORDER — ONDANSETRON 4 MG/1
4 TABLET, ORALLY DISINTEGRATING ORAL EVERY 4 HOURS PRN
Status: DISCONTINUED | OUTPATIENT
Start: 2025-01-16 | End: 2025-01-21 | Stop reason: HOSPADM

## 2025-01-16 RX ORDER — SODIUM CHLORIDE, SODIUM LACTATE, POTASSIUM CHLORIDE, AND CALCIUM CHLORIDE .6; .31; .03; .02 G/100ML; G/100ML; G/100ML; G/100ML
500 INJECTION, SOLUTION INTRAVENOUS ONCE
Status: COMPLETED | OUTPATIENT
Start: 2025-01-16 | End: 2025-01-16

## 2025-01-16 RX ORDER — CEFTRIAXONE 2 G/1
2000 INJECTION, POWDER, FOR SOLUTION INTRAMUSCULAR; INTRAVENOUS ONCE
Status: COMPLETED | OUTPATIENT
Start: 2025-01-16 | End: 2025-01-16

## 2025-01-16 RX ORDER — ONDANSETRON 2 MG/ML
4 INJECTION INTRAMUSCULAR; INTRAVENOUS EVERY 4 HOURS PRN
Status: DISCONTINUED | OUTPATIENT
Start: 2025-01-16 | End: 2025-01-21 | Stop reason: HOSPADM

## 2025-01-16 RX ORDER — ACETAMINOPHEN 650 MG/1
650 SUPPOSITORY RECTAL EVERY 4 HOURS PRN
Status: DISCONTINUED | OUTPATIENT
Start: 2025-01-16 | End: 2025-01-17

## 2025-01-16 RX ORDER — MEPERIDINE HYDROCHLORIDE 25 MG/ML
12.5 INJECTION INTRAMUSCULAR; INTRAVENOUS; SUBCUTANEOUS
Status: DISCONTINUED | OUTPATIENT
Start: 2025-01-16 | End: 2025-01-16

## 2025-01-16 RX ORDER — ROCURONIUM BROMIDE 10 MG/ML
INJECTION, SOLUTION INTRAVENOUS PRN
Status: DISCONTINUED | OUTPATIENT
Start: 2025-01-16 | End: 2025-01-16 | Stop reason: SURG

## 2025-01-16 RX ORDER — POLYETHYLENE GLYCOL 3350 17 G/17G
1 POWDER, FOR SOLUTION ORAL 2 TIMES DAILY
Status: DISCONTINUED | OUTPATIENT
Start: 2025-01-16 | End: 2025-01-21 | Stop reason: HOSPADM

## 2025-01-16 RX ORDER — OXYCODONE HCL 5 MG/5 ML
5 SOLUTION, ORAL ORAL
Status: DISCONTINUED | OUTPATIENT
Start: 2025-01-16 | End: 2025-01-16

## 2025-01-16 RX ORDER — PHENYLEPHRINE HCL IN 0.9% NACL 1 MG/10 ML
SYRINGE (ML) INTRAVENOUS PRN
Status: DISCONTINUED | OUTPATIENT
Start: 2025-01-16 | End: 2025-01-16 | Stop reason: SURG

## 2025-01-16 RX ORDER — ACETAMINOPHEN 325 MG/1
650 TABLET ORAL EVERY 4 HOURS PRN
Status: DISCONTINUED | OUTPATIENT
Start: 2025-01-16 | End: 2025-01-17

## 2025-01-16 RX ORDER — OXYCODONE HYDROCHLORIDE 5 MG/1
2.5 TABLET ORAL
Status: DISCONTINUED | OUTPATIENT
Start: 2025-01-16 | End: 2025-01-21 | Stop reason: HOSPADM

## 2025-01-16 RX ORDER — AMOXICILLIN 250 MG
1 CAPSULE ORAL
Status: DISCONTINUED | OUTPATIENT
Start: 2025-01-16 | End: 2025-01-21 | Stop reason: HOSPADM

## 2025-01-16 RX ORDER — SODIUM CHLORIDE, SODIUM LACTATE, POTASSIUM CHLORIDE, CALCIUM CHLORIDE 600; 310; 30; 20 MG/100ML; MG/100ML; MG/100ML; MG/100ML
INJECTION, SOLUTION INTRAVENOUS
Status: DISCONTINUED | OUTPATIENT
Start: 2025-01-16 | End: 2025-01-16 | Stop reason: SURG

## 2025-01-16 RX ORDER — DOCUSATE SODIUM 100 MG/1
100 CAPSULE, LIQUID FILLED ORAL 2 TIMES DAILY
Status: DISCONTINUED | OUTPATIENT
Start: 2025-01-16 | End: 2025-01-21 | Stop reason: HOSPADM

## 2025-01-16 RX ORDER — DIPHENHYDRAMINE HYDROCHLORIDE 50 MG/ML
12.5 INJECTION INTRAMUSCULAR; INTRAVENOUS
Status: DISCONTINUED | OUTPATIENT
Start: 2025-01-16 | End: 2025-01-16

## 2025-01-16 RX ORDER — CEFAZOLIN SODIUM 1 G/3ML
INJECTION, POWDER, FOR SOLUTION INTRAMUSCULAR; INTRAVENOUS PRN
Status: DISCONTINUED | OUTPATIENT
Start: 2025-01-16 | End: 2025-01-16 | Stop reason: SURG

## 2025-01-16 RX ORDER — BISACODYL 10 MG
10 SUPPOSITORY, RECTAL RECTAL
Status: DISCONTINUED | OUTPATIENT
Start: 2025-01-16 | End: 2025-01-21 | Stop reason: HOSPADM

## 2025-01-16 RX ORDER — ONDANSETRON 2 MG/ML
4 INJECTION INTRAMUSCULAR; INTRAVENOUS ONCE
Status: COMPLETED | OUTPATIENT
Start: 2025-01-16 | End: 2025-01-16

## 2025-01-16 RX ORDER — HALOPERIDOL 5 MG/ML
1 INJECTION INTRAMUSCULAR
Status: DISCONTINUED | OUTPATIENT
Start: 2025-01-16 | End: 2025-01-16

## 2025-01-16 RX ORDER — ENOXAPARIN SODIUM 100 MG/ML
40 INJECTION SUBCUTANEOUS DAILY
Status: DISCONTINUED | OUTPATIENT
Start: 2025-01-16 | End: 2025-01-21 | Stop reason: HOSPADM

## 2025-01-16 RX ORDER — LIDOCAINE HYDROCHLORIDE 20 MG/ML
INJECTION, SOLUTION EPIDURAL; INFILTRATION; INTRACAUDAL; PERINEURAL PRN
Status: DISCONTINUED | OUTPATIENT
Start: 2025-01-16 | End: 2025-01-16 | Stop reason: SURG

## 2025-01-16 RX ORDER — METRONIDAZOLE 500 MG/100ML
500 INJECTION, SOLUTION INTRAVENOUS EVERY 6 HOURS
Status: COMPLETED | OUTPATIENT
Start: 2025-01-16 | End: 2025-01-16

## 2025-01-16 RX ORDER — ACETAMINOPHEN 500 MG
1000 TABLET ORAL 3 TIMES DAILY PRN
COMMUNITY

## 2025-01-16 RX ORDER — OXYCODONE HCL 5 MG/5 ML
10 SOLUTION, ORAL ORAL
Status: DISCONTINUED | OUTPATIENT
Start: 2025-01-16 | End: 2025-01-16

## 2025-01-16 RX ORDER — HYDROCORTISONE SODIUM SUCCINATE 100 MG/2ML
100 INJECTION INTRAMUSCULAR; INTRAVENOUS EVERY 8 HOURS
Status: COMPLETED | OUTPATIENT
Start: 2025-01-16 | End: 2025-01-18

## 2025-01-16 RX ORDER — SODIUM CHLORIDE, SODIUM LACTATE, POTASSIUM CHLORIDE, CALCIUM CHLORIDE 600; 310; 30; 20 MG/100ML; MG/100ML; MG/100ML; MG/100ML
INJECTION, SOLUTION INTRAVENOUS CONTINUOUS
Status: DISCONTINUED | OUTPATIENT
Start: 2025-01-16 | End: 2025-01-18

## 2025-01-16 RX ORDER — MORPHINE SULFATE 4 MG/ML
2 INJECTION INTRAVENOUS
Status: DISCONTINUED | OUTPATIENT
Start: 2025-01-16 | End: 2025-01-21 | Stop reason: HOSPADM

## 2025-01-16 RX ORDER — ACETAMINOPHEN 500 MG
1000 TABLET ORAL EVERY 6 HOURS
Status: DISPENSED | OUTPATIENT
Start: 2025-01-16 | End: 2025-01-21

## 2025-01-16 RX ORDER — BUPIVACAINE HYDROCHLORIDE 2.5 MG/ML
INJECTION, SOLUTION EPIDURAL; INFILTRATION; INTRACAUDAL
Status: DISCONTINUED | OUTPATIENT
Start: 2025-01-16 | End: 2025-01-16 | Stop reason: HOSPADM

## 2025-01-16 RX ORDER — ONDANSETRON 2 MG/ML
4 INJECTION INTRAMUSCULAR; INTRAVENOUS
Status: DISCONTINUED | OUTPATIENT
Start: 2025-01-16 | End: 2025-01-16

## 2025-01-16 RX ADMIN — ONDANSETRON 4 MG: 2 INJECTION INTRAMUSCULAR; INTRAVENOUS at 10:36

## 2025-01-16 RX ADMIN — Medication 50 MCG: at 09:57

## 2025-01-16 RX ADMIN — MORPHINE SULFATE 4 MG: 4 INJECTION, SOLUTION INTRAMUSCULAR; INTRAVENOUS at 04:33

## 2025-01-16 RX ADMIN — CEFAZOLIN 2 G: 1 INJECTION, POWDER, FOR SOLUTION INTRAMUSCULAR; INTRAVENOUS at 09:55

## 2025-01-16 RX ADMIN — CEFTRIAXONE SODIUM 2000 MG: 2 INJECTION, POWDER, FOR SOLUTION INTRAMUSCULAR; INTRAVENOUS at 06:31

## 2025-01-16 RX ADMIN — PIPERACILLIN AND TAZOBACTAM 4.5 G: 4; .5 INJECTION, POWDER, FOR SOLUTION INTRAVENOUS at 12:45

## 2025-01-16 RX ADMIN — FENTANYL CITRATE 50 MCG: 50 INJECTION, SOLUTION INTRAMUSCULAR; INTRAVENOUS at 10:36

## 2025-01-16 RX ADMIN — SODIUM CHLORIDE, POTASSIUM CHLORIDE, SODIUM LACTATE AND CALCIUM CHLORIDE: 600; 310; 30; 20 INJECTION, SOLUTION INTRAVENOUS at 09:41

## 2025-01-16 RX ADMIN — ONDANSETRON 4 MG: 2 INJECTION INTRAMUSCULAR; INTRAVENOUS at 04:33

## 2025-01-16 RX ADMIN — Medication 100 MCG: at 09:54

## 2025-01-16 RX ADMIN — ACETAMINOPHEN 1000 MG: 500 TABLET ORAL at 23:18

## 2025-01-16 RX ADMIN — OXYCODONE 2.5 MG: 5 TABLET ORAL at 20:22

## 2025-01-16 RX ADMIN — PIPERACILLIN AND TAZOBACTAM 4.5 G: 4; .5 INJECTION, POWDER, FOR SOLUTION INTRAVENOUS at 20:30

## 2025-01-16 RX ADMIN — ROCURONIUM BROMIDE 50 MG: 50 INJECTION, SOLUTION INTRAVENOUS at 09:47

## 2025-01-16 RX ADMIN — SODIUM CHLORIDE, POTASSIUM CHLORIDE, SODIUM LACTATE AND CALCIUM CHLORIDE: 600; 310; 30; 20 INJECTION, SOLUTION INTRAVENOUS at 14:00

## 2025-01-16 RX ADMIN — LIDOCAINE HYDROCHLORIDE 60 MG: 20 INJECTION, SOLUTION EPIDURAL; INFILTRATION; INTRACAUDAL; PERINEURAL at 09:47

## 2025-01-16 RX ADMIN — FENTANYL CITRATE 50 MCG: 50 INJECTION, SOLUTION INTRAMUSCULAR; INTRAVENOUS at 09:44

## 2025-01-16 RX ADMIN — ENOXAPARIN SODIUM 40 MG: 100 INJECTION SUBCUTANEOUS at 20:23

## 2025-01-16 RX ADMIN — DEXAMETHASONE SODIUM PHOSPHATE 4 MG: 4 INJECTION INTRA-ARTICULAR; INTRALESIONAL; INTRAMUSCULAR; INTRAVENOUS; SOFT TISSUE at 09:50

## 2025-01-16 RX ADMIN — PROPOFOL 120 MG: 10 INJECTION, EMULSION INTRAVENOUS at 09:47

## 2025-01-16 RX ADMIN — HYDROCORTISONE SODIUM SUCCINATE 100 MG: 100 INJECTION, POWDER, FOR SOLUTION INTRAMUSCULAR; INTRAVENOUS at 21:13

## 2025-01-16 RX ADMIN — KETOROLAC TROMETHAMINE 15 MG: 15 INJECTION, SOLUTION INTRAMUSCULAR; INTRAVENOUS at 10:36

## 2025-01-16 RX ADMIN — SODIUM CHLORIDE, POTASSIUM CHLORIDE, SODIUM LACTATE AND CALCIUM CHLORIDE 500 ML: 600; 310; 30; 20 INJECTION, SOLUTION INTRAVENOUS at 21:36

## 2025-01-16 RX ADMIN — METRONIDAZOLE 500 MG: 500 SOLUTION INTRAVENOUS at 06:31

## 2025-01-16 RX ADMIN — LIDOCAINE HYDROCHLORIDE 3 ML: 20 JELLY TOPICAL at 09:48

## 2025-01-16 RX ADMIN — SODIUM CHLORIDE, POTASSIUM CHLORIDE, SODIUM LACTATE AND CALCIUM CHLORIDE 500 ML: 600; 310; 30; 20 INJECTION, SOLUTION INTRAVENOUS at 12:45

## 2025-01-16 RX ADMIN — SUGAMMADEX 200 MG: 100 INJECTION, SOLUTION INTRAVENOUS at 10:36

## 2025-01-16 RX ADMIN — ACETAMINOPHEN 1000 MG: 500 TABLET ORAL at 17:29

## 2025-01-16 RX ADMIN — PIPERACILLIN AND TAZOBACTAM 4.5 G: 4; .5 INJECTION, POWDER, FOR SOLUTION INTRAVENOUS at 16:09

## 2025-01-16 RX ADMIN — SODIUM CHLORIDE, POTASSIUM CHLORIDE, SODIUM LACTATE AND CALCIUM CHLORIDE 500 ML: 600; 310; 30; 20 INJECTION, SOLUTION INTRAVENOUS at 13:48

## 2025-01-16 RX ADMIN — HYDROCORTISONE SODIUM SUCCINATE 100 MG: 100 INJECTION, POWDER, FOR SOLUTION INTRAMUSCULAR; INTRAVENOUS at 17:29

## 2025-01-16 RX ADMIN — IOHEXOL 80 ML: 350 INJECTION, SOLUTION INTRAVENOUS at 04:53

## 2025-01-16 RX ADMIN — ACETAMINOPHEN 1000 MG: 500 TABLET ORAL at 12:08

## 2025-01-16 RX ADMIN — SENNOSIDES AND DOCUSATE SODIUM 1 TABLET: 50; 8.6 TABLET ORAL at 20:23

## 2025-01-16 ASSESSMENT — PAIN DESCRIPTION - PAIN TYPE
TYPE: ACUTE PAIN
TYPE: ACUTE PAIN;SURGICAL PAIN
TYPE: ACUTE PAIN

## 2025-01-16 ASSESSMENT — COPD QUESTIONNAIRES
COPD SCREENING SCORE: 2
HAVE YOU SMOKED AT LEAST 100 CIGARETTES IN YOUR ENTIRE LIFE: NO/DON'T KNOW
DURING THE PAST 4 WEEKS HOW MUCH DID YOU FEEL SHORT OF BREATH: NONE/LITTLE OF THE TIME
DO YOU EVER COUGH UP ANY MUCUS OR PHLEGM?: NO/ONLY WITH OCCASIONAL COLDS OR INFECTIONS

## 2025-01-16 ASSESSMENT — FIBROSIS 4 INDEX
FIB4 SCORE: 1.88
FIB4 SCORE: 0.72
FIB4 SCORE: 2.27

## 2025-01-16 NOTE — ANESTHESIA PREPROCEDURE EVALUATION
Case: [id number] Date/Time: 01/16/25 0957    Procedure: APPENDECTOMY, LAPAROSCOPIC    Pre-op diagnosis: acute appendicitis    Location: [facility] OR 09 / SURGERY [facility]    Surgeons: Brandon Contreras M.D.            Relevant Problems   ENDO   (positive) Acquired hypothyroidism       Physical Exam    Airway   Mallampati: II  TM distance: >3 FB  Neck ROM: full       Cardiovascular - normal exam  Rhythm: regular  Rate: normal     Dental - normal exam           Pulmonary - normal exam     Abdominal    Neurological - normal exam                   Anesthesia Plan    ASA 2       Plan - general       Airway plan will be ETT          Induction: intravenous    Postoperative Plan: Postoperative administration of opioids is intended.    Pertinent diagnostic labs and testing reviewed    Informed Consent:    Anesthetic plan and risks discussed with patient.    Use of blood products discussed with: patient whom consented to blood products.

## 2025-01-16 NOTE — ED NOTES
Med rec is complete per Patient at bedside.       Allergies reviewed.    Has patient had any outside antibiotics in the last 30 days? N    Any Anticoagulants (rivaroxaban, apixaban, edoxaban, dabigatran, warfarin, enoxaparin) taken in the last 14 days? N         Pharmacy/Pharmacies Pt utilizes : [facility] [phone]

## 2025-01-16 NOTE — PROGRESS NOTES
Bp 80/48 - Bob Ohara np notified - ordered 500ml LR bolus. Tylenol given for fever.     Fever resolved. Bp persistently low - second 500ml bolus ran, cbc and cortisol obtained, continuous fluids going at 100. Yariel to bedside - unconcerned about bleeding. Surgery went great. Lap sites c/d/I. Minimal pain.     Patient reporting lightheadedness. Ambulated oob to commode sba - bp after ambulation 80/52. Kecia aware.     Persistent sys bp in 70s - obtained Abdominal US - confirmed no bleed. Will transfer to icu for monitoring overnight.

## 2025-01-16 NOTE — ANESTHESIA PROCEDURE NOTES
Airway    Date/Time: 1/16/2025 9:48 AM    Performed by: Maciel Grayson M.D.  Authorized by: Maciel Grayson M.D.    Location:  OR  Urgency:  Elective  Indications for Airway Management:  Anesthesia      Spontaneous Ventilation: absent    Sedation Level:  Deep  Preoxygenated: Yes    Patient Position:  Sniffing  Final Airway Type:  Endotracheal airway  Final Endotracheal Airway:  ETT  Cuffed: Yes    Technique Used for Successful ETT Placement:  Direct laryngoscopy    Insertion Site:  Oral  Blade Type:  Yolande  Laryngoscope Blade/Videolaryngoscope Blade Size:  3  ETT Size (mm):  6.5  Measured from:  Teeth  ETT to Teeth (cm):  22  Placement Verified by: auscultation and capnometry    Cormack-Lehane Classification:  Grade I - full view of glottis  Number of Attempts at Approach:  1

## 2025-01-16 NOTE — H&P
DATE OF CONSULTATION:  1/16/2025     REFERRING PHYSICIAN:   Matt Lema M.D.     CONSULTING PHYSICIAN:  Herrera Doherty M.D.     REASON FOR CONSULTATION:  I have been asked by Dr. Lema to see the patient in surgical consultation for evaluation of abdominal pain.    HISTORY OF PRESENT ILLNESS: The patient is a 59 year-old woman with a history of ulcerative colitis who presents to the Emergency Department with 4 days of lower abdominal pain.  Patient endorses nausea and anorexia but no emesis.  She has also had some diarrhea which is nonbloody.  The patient states that she feels her ulcerative colitis is relatively well-controlled at this time and this does not feel like her previous episodes of UC flares.  She is undergoing infusions of Entyvio every 4 weeks, most recently 1 week ago.  The patient reports having a colonoscopy approximately 1 week ago and does not recall being told she had significant active inflammation.  She denies any prior abdominal surgeries.  Her ulcerative colitis was diagnosed in the 1990s.    PAST MEDICAL HISTORY:  has a past medical history of Bleeding from the nose, Blood transfusion without reported diagnosis, Hay fever, Hives, Measles, and Mumps.    PAST SURGICAL HISTORY:  has a past surgical history that includes eye surgery.    ALLERGIES:   Allergies   Allergen Reactions    Sulfa Drugs Shortness of Breath    Sulfasalazine Shortness of Breath    Latex Unspecified     Bumps      Mesalamine Shortness of Breath     Given new RX 7/2019, currently taking       CURRENT MEDICATIONS:    Home Medications       Reviewed by Dale Sampson R.N. (Registered Nurse) on 01/16/25 at 0338  Med List Status: Partial     Medication Last Dose Status   azathioprine (IMURAN) 50 MG Tab  Active   calcium citrate 315 mg-vitamin D 200 mg (CALCIUM CITRATE +) 315-200 MG-UNIT per tablet  Active   Diclofenac Sodium 1 % Gel  Active   IRON PO  Active   levothyroxine (SYNTHROID) 88 MCG Tab  Active    Mesalamine (APRISO) 0.375 GM CAPSULE SR 24 HR  Active   oxybutynin (DITROPAN) 5 MG Tab  Active                  Audit from Redirected Encounters    **Home medications have not yet been reviewed for this encounter**         FAMILY HISTORY: family history is not on file.    SOCIAL HISTORY:  reports that she has never smoked. She has never used smokeless tobacco. She reports current alcohol use. She reports that she does not use drugs.    REVIEW OF SYSTEMS: Comprehensive review of systems is negative with the exception of the aforementioned HPI, PMH, and PSH bullets in accordance with CMS guidelines.    PHYSICAL EXAMINATION:    CONSTITUTIONAL: Alert, awake, oriented x3.  HEENT: Normocephalic, atraumatic. PERRL. Conjunctivae normal.  NECK: Supple  CARDIOVASCULAR: Normal rate, regular rhythm.  PULMONARY/CHEST: No respiratory distress. Chest wall stable, non-tender, normal excursion.  ABDOMEN: Soft, non-distended, tender to palpation in the lower abdomen, most significant in the suprapubic region.  MUSCULOSKELETAL: Moving all extremities.  NEUROLOGICAL: CNII-XII grossly intact, no focal deficits.  SKIN: Warm and dry. No abrasions, lacerations.  PSYCHIATRIC: Behavior appropriate for situation.    LABORATORY VALUES:   Recent Labs     01/16/25  0426   WBC 8.8   RBC 4.37   HEMOGLOBIN 12.7   HEMATOCRIT 39.2   MCV 89.7   MCH 29.1   MCHC 32.4   RDW 47.9   PLATELETCT 307   MPV 8.8*     Recent Labs     01/16/25  0426   SODIUM 134*   POTASSIUM 3.8   CHLORIDE 99   CO2 22   GLUCOSE 130*   BUN 14   CREATININE 0.78   CALCIUM 9.7     Recent Labs     01/16/25  0426   ASTSGOT 24   ALTSGPT 12   TBILIRUBIN 0.7   ALKPHOSPHAT 73   GLOBULIN 3.6*            IMAGING:   CT-ABDOMEN-PELVIS WITH   Final Result         1.  Changes of acute appendicitis   2.  Fluid-filled dilated small bowel loops, appearance favoring component of ileus   3.  Trace of pleural effusion      These findings were discussed with the patient's clinician, Matt MABRY  Torey, on 1/16/2025 5:58 AM.          ASSESSMENT AND PLAN:     This is a 59-year-old female with a history of ulcerative colitis diagnosed in the 1990s, on Entyvio, presenting with acute appendicitis.    Appendicitis, acute  Assessment & Plan  The patient has CT and clinical evidence of acute appendicitis and would benefit from a laparoscopic, possible open appendectomy today  She has been counseled regarding the risks, alternatives, and benefits of the procedure.  She understands that her history of ulcerative colitis makes this procedure more high risk for complications such as staple line dehiscence, and that the appendectomy will not be performed if it is not felt to be safe due to inflammation of the surrounding intestines  N.p.o.  Ceftriaxone and Flagyl      DISPOSITION: Admit [facility].     ____________________________________     Herrera Doherty M.D.    DD: 1/16/2025  6:27 AM    AAST Grading System for EGS Conditions  ACS NSQIP Surgical Risk Calculator

## 2025-01-16 NOTE — ED TRIAGE NOTES
Chief Complaint   Patient presents with    Abdominal Pain     Lower abd pain 10/10 for two days.     Diarrhea     For 3 days      Vitals:    01/16/25 0324   BP: 94/72   Pulse: 99   Resp: 18   Temp: 37.4 °C (99.4 °F)   SpO2: 94%     Patient ambulatory to triage for above complaint. Patient A&Ox4, GCS 15, patient speaking in full sentences. Equal and unlabored respirations. Patient educated on triage process and encouraged to notify staff if condition worsens. Appropriate protocols ordered. Patient returned to the lobby in stable condition.

## 2025-01-16 NOTE — ED PROVIDER NOTES
ED Provider Note    CHIEF COMPLAINT  Chief Complaint   Patient presents with    Abdominal Pain     Lower abd pain 10/10 for two days.     Diarrhea     For 3 days        EXTERNAL RECORDS REVIEWED  Outpatient Notes ulcerative colitis on telemedicine visit on 8/8/2024    HPI/ROS  LIMITATION TO HISTORY   Select: : None  OUTSIDE HISTORIAN(S):  Family patient's son reports the patient has been in severe abdominal pain for 2 days    Mira Ramirez is a 59 y.o. female who presents with severe lower abdominal pain.  Patient denies dysuria or hematuria.  Patient endorses nausea but no vomiting.  Patient endorses diarrhea and reports she is passing stool.  Patient reports this feels different from her typical UC flare and does not feel like her nephrolithiasis either.    PAST MEDICAL HISTORY   has a past medical history of Bleeding from the nose, Blood transfusion without reported diagnosis, Hay fever, Hives, Measles, and Mumps.    SURGICAL HISTORY   has a past surgical history that includes eye surgery.    FAMILY HISTORY  Family History   Problem Relation Age of Onset    Arthritis Neg Hx     Lung Disease Neg Hx     Genetic Disorder Neg Hx     Cancer Neg Hx     Psychiatric Illness Neg Hx     Diabetes Neg Hx     Heart Disease Neg Hx     Hypertension Neg Hx     Hyperlipidemia Neg Hx     Stroke Neg Hx     Alcohol/Drug Neg Hx        SOCIAL HISTORY  Social History     Tobacco Use    Smoking status: Never    Smokeless tobacco: Never   Substance and Sexual Activity    Alcohol use: Yes     Alcohol/week: 0.0 oz     Comment: rare    Drug use: No    Sexual activity: Not Currently     Partners: Male       CURRENT MEDICATIONS  Home Medications       Reviewed by Dale Sampson R.N. (Registered Nurse) on 01/16/25 at 0338  Med List Status: Partial     Medication Last Dose Status   azathioprine (IMURAN) 50 MG Tab  Active   calcium citrate 315 mg-vitamin D 200 mg (CALCIUM CITRATE +) 315-200 MG-UNIT per tablet  Active   Diclofenac  "Sodium 1 % Gel  Active   IRON PO  Active   levothyroxine (SYNTHROID) 88 MCG Tab  Active   Mesalamine (APRISO) 0.375 GM CAPSULE SR 24 HR  Active   oxybutynin (DITROPAN) 5 MG Tab  Active                  Audit from Redirected Encounters    **Home medications have not yet been reviewed for this encounter**         ALLERGIES  Allergies   Allergen Reactions    Sulfa Drugs Shortness of Breath    Sulfasalazine Shortness of Breath    Latex Unspecified     Bumps      Mesalamine Shortness of Breath     Given new RX 7/2019, currently taking       PHYSICAL EXAM  VITAL SIGNS: /64   Pulse (!) 103   Temp 37.2 °C (99 °F)   Resp 16   Ht 1.549 m (5' 1\")   Wt 54.4 kg (120 lb)   LMP  (LMP Unknown)   SpO2 98%   BMI 22.67 kg/m²    Vitals and nursing note reviewed.   Constitutional:       Comments: Patient is lying in bed supine, pleasant, conversant, speaking in complete sentences, uncomfortable appearing  HENT:      Head: Normocephalic and atraumatic.   Eyes:      Extraocular Movements: Extraocular movements intact.      Conjunctiva/sclera: Conjunctivae normal.      Pupils: Pupils are equal, round, and reactive to light.   Cardiovascular:      Pulses: Normal pulses.      Comments: HR 99  Pulmonary:      Effort: Pulmonary effort is normal. No respiratory distress.   Abdominal:      Comments: Abdomen is soft, very tender in the lower abdomen  Musculoskeletal:         General: No swelling. Normal range of motion.      Cervical back: Normal range of motion. No rigidity.   Skin:     General: Skin is warm and dry.      Capillary Refill: Capillary refill takes less than 2 seconds.   Neurological:      Mental Status: Alert.     RADIOLOGY/PROCEDURES   I have independently interpreted the diagnostic imaging associated with this visit and am waiting the final reading from the radiologist.   My preliminary interpretation is as follows: No perforation    Radiologist interpretation:  CT-ABDOMEN-PELVIS WITH   Final Result         1.  " Changes of acute appendicitis   2.  Fluid-filled dilated small bowel loops, appearance favoring component of ileus   3.  Trace of pleural effusion      These findings were discussed with the patient's clinician, Matt Lema, on 1/16/2025 5:58 AM.          COURSE & MEDICAL DECISION MAKING    ASSESSMENT, COURSE AND PLAN  Care Narrative: CBC to evaluate for acute anemia and leukocytosis.  CMP to evaluate for acute electrolyte abnormality, acute kidney injury, acute liver failure or dysfunction.  Lipase to evaluate for pancreatitis.  Urinalysis to evaluate for UTI versus pyelonephritis.  CT abdomen pelvis to evaluate for nephrolithiasis, abscess, obstruction, appendicitis, diverticulitis or other acute intra-abdominal process.  Morphine and Zofran for pain control.    Electronically signed by: Matt Lema M.D., 1/16/2025 4:37 AM    CMP demonstrates no evidence of acute kidney injury, acute electrolyte abnormality, acute liver failure, CBC demonstrates no evidence of acute anemia or leukocytosis.  Urinalysis without evidence of UTI.  CT abdomen pelvis demonstrates findings consistent with acute appendicitis.  Dr. Doherty of general surgery is, the bedside to evaluate the patient and will take the patient to the operating room.  IV antibiotics have been ordered.    This dictation has been created using voice recognition software. I am continuously working with the software to minimize the number of voice recognition errors and I have made every attempt to manually correct the errors within my dictation. However errors  related to this voice recognition software may still exist and should be interpreted within the appropriate context.     Electronically signed by: Matt Lema M.D., 1/16/2025 6:14 AM        DISPOSITION AND DISCUSSIONS  I have discussed management of the patient with the following physicians and HOWARD's: Dr. Doherty    FINAL DIAGNOSIS  1. Acute appendicitis, unspecified acute  appendicitis type         Electronically signed by: Matt Lema M.D., 1/16/2025 4:36 AM

## 2025-01-16 NOTE — OP REPORT
DATE OF OPERATION: 1/16/2025     PREOPERATIVE DIAGNOSIS:Acute appendicitis.    POSTOPERATIVE DIAGNOSIS:Suppurative appendicitis.     PROCEDURE PERFORMED:Laparoscopic appendectomy     SURGEON: Brandon Contreras M.D.    ASSISTANT: JENNY Estrella.    ANESTHESIOLOGIST: Maciel Grayson M.D.     ANESTHESIA: General endotracheal anesthesia.    INDICATIONS: The patient is a 59 year-old woman with clinical and radiographic findings of acute appendicitis. She is taken to the operating room for laparoscopic appendectomy.     The nature of the surgical procedure warranted additional skilled operative assistance from an Advanced Registered Nurse Practitioner (ARNP). The assistant was present during the entire operation. The surgical assistant performed the following: provided assistance with optimal surgical exposure of the operative field and provided high complexity, subspecialty decision making input.    FINDINGS:   The appendix was dilated and inflamed  Purulent fluid in the RLQ and pelvis    WOUND CLASSIFICATION:  Class III, Contaminated.    SPECIMEN: Appendix.    ESTIMATED BLOOD LOSS: 5 mL.     PROCEDURE: Following informed consent consent, the patient was properly identified, taken to the operating room and placed in supine position where general endotracheal anesthesia was administered. Intravenous antibiotics were administered by the anesthesiologist in correct time interval. The patient voided prior to surgery. A urinary catheter was not placed. Sequential compression devices were employed. The abdomen was prepped and draped into a sterile field. A timeout was conducted with the full attention and participation of all operating room personnel.    Marcaine 0.5% with epinephrine was used to infiltrate the port sites. A small incision was made at Styles's point and a Veress needle was atraumatically inserted. Carbon dioxide pneumoperitoneum was instilled.     A 5 mm infraumbilical incision was made and an Chemclinview  port was passed with a 5 mm 30 degree lens and camera. There was no evidence of injury during trocar placement. A 12 mm port was placed in left lower quadrant under direct vision. A 5 mm port was placed in suprapubic midline under direct vision. The appendix was identified and elevated, appearance was consistent with acute purulent appendicitis. The filmy adhesions were taken down bluntly. The appendiceal mesentery was then taken down with a single firing of an Endo-MARISEL stapler with White Vascular/Thin load.The appendix was divided at its base with a single firing of an Endo-MARISEL stapler with a Blue Regular load.      The appendix was placed within a laparoscopic specimen retrieval bag and delivered intact from the abdominal cavity and submitted for permanent pathology. The resection site was inspected. Hemostasis was satisfactory.    The left lower quadrant port site fascia was approximated using a trocar site closure device with a 0 VICRYL® Plus Antibacterial suture.. The abdomen was desufflated and the ports were removed. The port site skin incisions were closed with interrupted 4-0 VICRYL® Plus Antibacterial subcuticular sutures. A DERMABOND ADVANCED® Topical Skin Adhesive dressing was applied.    The patient tolerated the procedure well, and there were no apparent complications. All sponge, needle, and instrument counts were correct on 2 separate occasions. The patient was awakened, extubated, and transferred to  to the post anesthesia care unit (PACU) in satisfactory condition.     ____________________________________     Brandon Contreras M.D.    DD: 1/16/2025  10:35 AM

## 2025-01-16 NOTE — ANESTHESIA POSTPROCEDURE EVALUATION
Patient: Mira Ramirez    Procedure Summary       Date: 01/16/25 Room / Location: [facility] 09 / SURGERY [facility]    Anesthesia Start: 0941 Anesthesia Stop: 1049    Procedure: APPENDECTOMY, LAPAROSCOPIC (Abdomen) Diagnosis: (purulent appendicitis)    Surgeons: Brandon Contreras M.D. Responsible Provider: Maciel Grayson M.D.    Anesthesia Type: general ASA Status: 2            Final Anesthesia Type: general  Last vitals  BP   Blood Pressure: (!) 80/48    Temp   37.3 °C (99.2 °F)    Pulse   86   Resp   17    SpO2   94 %      Anesthesia Post Evaluation    Patient location during evaluation: PACU  Patient participation: complete - patient participated  Level of consciousness: awake and alert    Airway patency: patent  Anesthetic complications: no  Cardiovascular status: hemodynamically stable  Respiratory status: acceptable  Hydration status: euvolemic    PONV: none          No notable events documented.     Nurse Pain Score: 3 (NPRS)

## 2025-01-16 NOTE — ASSESSMENT & PLAN NOTE
1/16 Laparoscopic appendectomy.  Superlative appendicitis with no perforation.  1/19 PO day 3, continue Zosyn.  1/20 4 day course of Zosyn to complete.  Transition to 7 day course of Augmentin to start 1/21

## 2025-01-16 NOTE — ANESTHESIA TIME REPORT
Anesthesia Start and Stop Event Times       Date Time Event    1/16/2025 0927 Ready for Procedure     0941 Anesthesia Start     1049 Anesthesia Stop          Responsible Staff  01/16/25      Name Role Begin End    Maciel Grayson M.D. Anesth 0941 1049          Overtime Reason:  no overtime (within assigned shift)    Comments:

## 2025-01-16 NOTE — PROGRESS NOTES
"  DATE: 1/16/2025  Mira Ramirez is a 59 y.o. women presenting with abdominal pain likely secondary to acute appendicitis    INTERVAL EVENTS:  Abx started  CT reviewed Plan for lap appy this am    REVIEW OF SYSTEMS:  Comprehensive review of systems is negative with the exception of the aforementioned HPI, PMH, and PSH bullets in accordance with CMS guidelines.    PHYSICAL EXAMINATION:  Vital Signs: /64   Pulse (!) 111   Temp 36.4 °C (97.6 °F) (Temporal)   Resp 18   Ht 1.549 m (5' 1\")   Wt 54.4 kg (120 lb)   SpO2 91%   Physical Exam  Vitals and nursing note reviewed.   Constitutional:       Appearance: Normal appearance.   HENT:      Head: Normocephalic.      Nose: Nose normal.      Mouth/Throat:      Mouth: Mucous membranes are moist.      Pharynx: Oropharynx is clear.   Eyes:      Conjunctiva/sclera: Conjunctivae normal.   Cardiovascular:      Rate and Rhythm: Normal rate and regular rhythm.   Pulmonary:      Effort: Pulmonary effort is normal. No respiratory distress.   Abdominal:      General: Abdomen is flat. There is no distension.      Palpations: Abdomen is soft.      Comments: +ttp in the lower abdomen    Skin:     General: Skin is warm and dry.   Neurological:      General: No focal deficit present.      Mental Status: She is alert and oriented to person, place, and time.   Psychiatric:         Mood and Affect: Mood normal.       LABORATORY VALUES:  Recent Labs     01/16/25 0426   WBC 8.8   RBC 4.37   HEMOGLOBIN 12.7   HEMATOCRIT 39.2   MCV 89.7   MCH 29.1   MCHC 32.4   RDW 47.9   PLATELETCT 307   MPV 8.8*     Recent Labs     01/16/25 0426   SODIUM 134*   POTASSIUM 3.8   CHLORIDE 99   CO2 22   GLUCOSE 130*   BUN 14   CREATININE 0.78   CALCIUM 9.7     Recent Labs     01/16/25 0426   ASTSGOT 24   ALTSGPT 12   TBILIRUBIN 0.7   ALKPHOSPHAT 73   GLOBULIN 3.6*           IMAGING:  CT-ABDOMEN-PELVIS WITH   Final Result         1.  Changes of acute appendicitis   2.  Fluid-filled dilated small " bowel loops, appearance favoring component of ileus   3.  Trace of pleural effusion      These findings were discussed with the patient's clinician, Matt Lema, on 1/16/2025 5:58 AM.          ASSESSMENT AND PLAN:  Appendicitis, acute  Assessment & Plan  The patient has CT and clinical evidence of acute appendicitis and would benefit from a laparoscopic, possible open appendectomy today  She has been counseled regarding the risks, alternatives, and benefits of the procedure.  She understands that her history of ulcerative colitis makes this procedure more high risk for complications such as staple line dehiscence, and that the appendectomy will not be performed if it is not felt to be safe due to inflammation of the surrounding intestines  N.p.o.  Ceftriaxone and Flagyl      The risk, benefits, and alternatives to surgery were discussed in detail with the patient.  The risks include (but not limited to) bleeding, infection, injury to other structures, and prolonged hospitalization.  All questions were answered and the patient is amendable to surgical intervention.       ____________________________________     Brandon Contreras M.D.    DD: 1/16/2025  9:03 AM

## 2025-01-17 ENCOUNTER — APPOINTMENT (OUTPATIENT)
Dept: RADIOLOGY | Facility: MEDICAL CENTER | Age: 60
DRG: 397 | End: 2025-01-17
Attending: STUDENT IN AN ORGANIZED HEALTH CARE EDUCATION/TRAINING PROGRAM
Payer: COMMERCIAL

## 2025-01-17 ENCOUNTER — APPOINTMENT (OUTPATIENT)
Dept: CARDIOLOGY | Facility: MEDICAL CENTER | Age: 60
DRG: 397 | End: 2025-01-17
Payer: COMMERCIAL

## 2025-01-17 ENCOUNTER — APPOINTMENT (OUTPATIENT)
Dept: CARDIOLOGY | Facility: MEDICAL CENTER | Age: 60
DRG: 397 | End: 2025-01-17
Attending: INTERNAL MEDICINE
Payer: COMMERCIAL

## 2025-01-17 PROBLEM — I49.9 ARRHYTHMIA: Status: ACTIVE | Noted: 2025-01-17

## 2025-01-17 LAB
ANION GAP SERPL CALC-SCNC: 12 MMOL/L (ref 7–16)
ANION GAP SERPL CALC-SCNC: 14 MMOL/L (ref 7–16)
BASOPHILS # BLD AUTO: 0.1 % (ref 0–1.8)
BASOPHILS # BLD: 0.01 K/UL (ref 0–0.12)
BUN SERPL-MCNC: 13 MG/DL (ref 8–22)
BUN SERPL-MCNC: 13 MG/DL (ref 8–22)
CALCIUM SERPL-MCNC: 8.5 MG/DL (ref 8.5–10.5)
CALCIUM SERPL-MCNC: 8.9 MG/DL (ref 8.5–10.5)
CHLORIDE SERPL-SCNC: 103 MMOL/L (ref 96–112)
CHLORIDE SERPL-SCNC: 103 MMOL/L (ref 96–112)
CO2 SERPL-SCNC: 21 MMOL/L (ref 20–33)
CO2 SERPL-SCNC: 26 MMOL/L (ref 20–33)
CREAT SERPL-MCNC: 0.53 MG/DL (ref 0.5–1.4)
CREAT SERPL-MCNC: 0.58 MG/DL (ref 0.5–1.4)
EKG IMPRESSION: NORMAL
EOSINOPHIL # BLD AUTO: 0 K/UL (ref 0–0.51)
EOSINOPHIL NFR BLD: 0 % (ref 0–6.9)
ERYTHROCYTE [DISTWIDTH] IN BLOOD BY AUTOMATED COUNT: 49.9 FL (ref 35.9–50)
GFR SERPLBLD CREATININE-BSD FMLA CKD-EPI: 103 ML/MIN/1.73 M 2
GFR SERPLBLD CREATININE-BSD FMLA CKD-EPI: 105 ML/MIN/1.73 M 2
GLUCOSE SERPL-MCNC: 159 MG/DL (ref 65–99)
GLUCOSE SERPL-MCNC: 187 MG/DL (ref 65–99)
HCT VFR BLD AUTO: 33 % (ref 37–47)
HGB BLD-MCNC: 10.4 G/DL (ref 12–16)
IMM GRANULOCYTES # BLD AUTO: 0.14 K/UL (ref 0–0.11)
IMM GRANULOCYTES NFR BLD AUTO: 1.6 % (ref 0–0.9)
LACTATE SERPL-SCNC: 1.1 MMOL/L (ref 0.5–2)
LACTATE SERPL-SCNC: 9.6 MMOL/L (ref 0.5–2)
LV EJECT FRACT  99904: 56
LV EJECT FRACT MOD 2C 99903: 55
LV EJECT FRACT MOD 4C 99902: 56.3
LYMPHOCYTES # BLD AUTO: 1.43 K/UL (ref 1–4.8)
LYMPHOCYTES NFR BLD: 16.2 % (ref 22–41)
MAGNESIUM SERPL-MCNC: 1.4 MG/DL (ref 1.5–2.5)
MAGNESIUM SERPL-MCNC: 2.5 MG/DL (ref 1.5–2.5)
MCH RBC QN AUTO: 28.6 PG (ref 27–33)
MCHC RBC AUTO-ENTMCNC: 31.5 G/DL (ref 32.2–35.5)
MCV RBC AUTO: 90.7 FL (ref 81.4–97.8)
MONOCYTES # BLD AUTO: 0.4 K/UL (ref 0–0.85)
MONOCYTES NFR BLD AUTO: 4.5 % (ref 0–13.4)
NEUTROPHILS # BLD AUTO: 6.84 K/UL (ref 1.82–7.42)
NEUTROPHILS NFR BLD: 77.6 % (ref 44–72)
NRBC # BLD AUTO: 0 K/UL
NRBC BLD-RTO: 0 /100 WBC (ref 0–0.2)
PHOSPHATE SERPL-MCNC: 2.9 MG/DL (ref 2.5–4.5)
PHOSPHATE SERPL-MCNC: 3 MG/DL (ref 2.5–4.5)
PLATELET # BLD AUTO: 247 K/UL (ref 164–446)
PMV BLD AUTO: 9.3 FL (ref 9–12.9)
POTASSIUM SERPL-SCNC: 3.6 MMOL/L (ref 3.6–5.5)
POTASSIUM SERPL-SCNC: 4 MMOL/L (ref 3.6–5.5)
RBC # BLD AUTO: 3.64 M/UL (ref 4.2–5.4)
SODIUM SERPL-SCNC: 138 MMOL/L (ref 135–145)
SODIUM SERPL-SCNC: 141 MMOL/L (ref 135–145)
T3FREE SERPL-MCNC: 1.29 PG/ML (ref 2–4.4)
T4 FREE SERPL-MCNC: 1.42 NG/DL (ref 0.93–1.7)
TROPONIN T SERPL-MCNC: 8 NG/L (ref 6–19)
TSH SERPL DL<=0.005 MIU/L-ACNC: 0.24 UIU/ML (ref 0.38–5.33)
WBC # BLD AUTO: 8.8 K/UL (ref 4.8–10.8)

## 2025-01-17 PROCEDURE — 99223 1ST HOSP IP/OBS HIGH 75: CPT | Mod: 25 | Performed by: INTERNAL MEDICINE

## 2025-01-17 PROCEDURE — 93306 TTE W/DOPPLER COMPLETE: CPT

## 2025-01-17 PROCEDURE — 80048 BASIC METABOLIC PNL TOTAL CA: CPT

## 2025-01-17 PROCEDURE — 700101 HCHG RX REV CODE 250: Performed by: STUDENT IN AN ORGANIZED HEALTH CARE EDUCATION/TRAINING PROGRAM

## 2025-01-17 PROCEDURE — 87040 BLOOD CULTURE FOR BACTERIA: CPT

## 2025-01-17 PROCEDURE — 93306 TTE W/DOPPLER COMPLETE: CPT | Mod: 26 | Performed by: INTERNAL MEDICINE

## 2025-01-17 PROCEDURE — 02H633Z INSERTION OF INFUSION DEVICE INTO RIGHT ATRIUM, PERCUTANEOUS APPROACH: ICD-10-PCS | Performed by: STUDENT IN AN ORGANIZED HEALTH CARE EDUCATION/TRAINING PROGRAM

## 2025-01-17 PROCEDURE — C1751 CATH, INF, PER/CENT/MIDLINE: HCPCS

## 2025-01-17 PROCEDURE — 84439 ASSAY OF FREE THYROXINE: CPT

## 2025-01-17 PROCEDURE — 700111 HCHG RX REV CODE 636 W/ 250 OVERRIDE (IP): Mod: JZ | Performed by: STUDENT IN AN ORGANIZED HEALTH CARE EDUCATION/TRAINING PROGRAM

## 2025-01-17 PROCEDURE — 36556 INSERT NON-TUNNEL CV CATH: CPT

## 2025-01-17 PROCEDURE — 83605 ASSAY OF LACTIC ACID: CPT

## 2025-01-17 PROCEDURE — 700111 HCHG RX REV CODE 636 W/ 250 OVERRIDE (IP): Performed by: STUDENT IN AN ORGANIZED HEALTH CARE EDUCATION/TRAINING PROGRAM

## 2025-01-17 PROCEDURE — 770022 HCHG ROOM/CARE - ICU (200)

## 2025-01-17 PROCEDURE — 02H63JZ INSERTION OF PACEMAKER LEAD INTO RIGHT ATRIUM, PERCUTANEOUS APPROACH: ICD-10-PCS | Performed by: INTERNAL MEDICINE

## 2025-01-17 PROCEDURE — C1894 INTRO/SHEATH, NON-LASER: HCPCS

## 2025-01-17 PROCEDURE — 84481 FREE ASSAY (FT-3): CPT

## 2025-01-17 PROCEDURE — A9270 NON-COVERED ITEM OR SERVICE: HCPCS | Performed by: STUDENT IN AN ORGANIZED HEALTH CARE EDUCATION/TRAINING PROGRAM

## 2025-01-17 PROCEDURE — 700101 HCHG RX REV CODE 250

## 2025-01-17 PROCEDURE — 700105 HCHG RX REV CODE 258: Performed by: STUDENT IN AN ORGANIZED HEALTH CARE EDUCATION/TRAINING PROGRAM

## 2025-01-17 PROCEDURE — 36556 INSERT NON-TUNNEL CV CATH: CPT | Mod: RT | Performed by: STUDENT IN AN ORGANIZED HEALTH CARE EDUCATION/TRAINING PROGRAM

## 2025-01-17 PROCEDURE — 700111 HCHG RX REV CODE 636 W/ 250 OVERRIDE (IP): Mod: JZ

## 2025-01-17 PROCEDURE — 700105 HCHG RX REV CODE 258: Performed by: NURSE PRACTITIONER

## 2025-01-17 PROCEDURE — 85025 COMPLETE CBC W/AUTO DIFF WBC: CPT

## 2025-01-17 PROCEDURE — 5A1223Z PERFORMANCE OF CARDIAC PACING, CONTINUOUS: ICD-10-PCS | Performed by: INTERNAL MEDICINE

## 2025-01-17 PROCEDURE — 700102 HCHG RX REV CODE 250 W/ 637 OVERRIDE(OP): Performed by: STUDENT IN AN ORGANIZED HEALTH CARE EDUCATION/TRAINING PROGRAM

## 2025-01-17 PROCEDURE — 93005 ELECTROCARDIOGRAM TRACING: CPT | Mod: TC | Performed by: STUDENT IN AN ORGANIZED HEALTH CARE EDUCATION/TRAINING PROGRAM

## 2025-01-17 PROCEDURE — 700111 HCHG RX REV CODE 636 W/ 250 OVERRIDE (IP): Mod: JZ | Performed by: NURSE PRACTITIONER

## 2025-01-17 PROCEDURE — 99152 MOD SED SAME PHYS/QHP 5/>YRS: CPT | Performed by: INTERNAL MEDICINE

## 2025-01-17 PROCEDURE — 33210 INSERT ELECTRD/PM CATH SNGL: CPT | Performed by: INTERNAL MEDICINE

## 2025-01-17 PROCEDURE — 84484 ASSAY OF TROPONIN QUANT: CPT

## 2025-01-17 PROCEDURE — 83735 ASSAY OF MAGNESIUM: CPT

## 2025-01-17 PROCEDURE — 84100 ASSAY OF PHOSPHORUS: CPT

## 2025-01-17 PROCEDURE — 84443 ASSAY THYROID STIM HORMONE: CPT

## 2025-01-17 RX ORDER — MIDAZOLAM HYDROCHLORIDE 1 MG/ML
INJECTION INTRAMUSCULAR; INTRAVENOUS
Status: COMPLETED
Start: 2025-01-17 | End: 2025-01-17

## 2025-01-17 RX ORDER — NOREPINEPHRINE BITARTRATE 0.03 MG/ML
0-1 INJECTION, SOLUTION INTRAVENOUS CONTINUOUS
Status: DISCONTINUED | OUTPATIENT
Start: 2025-01-17 | End: 2025-01-18

## 2025-01-17 RX ORDER — HEPARIN SODIUM 200 [USP'U]/100ML
INJECTION, SOLUTION INTRAVENOUS
Status: COMPLETED
Start: 2025-01-17 | End: 2025-01-17

## 2025-01-17 RX ORDER — HEPARIN SODIUM 1000 [USP'U]/ML
INJECTION, SOLUTION INTRAVENOUS; SUBCUTANEOUS
Status: COMPLETED
Start: 2025-01-17 | End: 2025-01-17

## 2025-01-17 RX ORDER — DIPHENHYDRAMINE HYDROCHLORIDE 50 MG/ML
25 INJECTION INTRAMUSCULAR; INTRAVENOUS ONCE
Status: COMPLETED | OUTPATIENT
Start: 2025-01-17 | End: 2025-01-17

## 2025-01-17 RX ORDER — LIDOCAINE HYDROCHLORIDE 20 MG/ML
INJECTION, SOLUTION INFILTRATION; PERINEURAL
Status: COMPLETED
Start: 2025-01-17 | End: 2025-01-17

## 2025-01-17 RX ORDER — FUROSEMIDE 10 MG/ML
20 INJECTION INTRAMUSCULAR; INTRAVENOUS ONCE
Status: COMPLETED | OUTPATIENT
Start: 2025-01-17 | End: 2025-01-17

## 2025-01-17 RX ORDER — MAGNESIUM SULFATE HEPTAHYDRATE 40 MG/ML
4 INJECTION, SOLUTION INTRAVENOUS ONCE
Status: COMPLETED | OUTPATIENT
Start: 2025-01-17 | End: 2025-01-17

## 2025-01-17 RX ORDER — POTASSIUM CHLORIDE 29.8 MG/ML
40 INJECTION INTRAVENOUS ONCE
Status: COMPLETED | OUTPATIENT
Start: 2025-01-17 | End: 2025-01-17

## 2025-01-17 RX ADMIN — ACETAMINOPHEN 1000 MG: 500 TABLET ORAL at 23:45

## 2025-01-17 RX ADMIN — PIPERACILLIN AND TAZOBACTAM 4.5 G: 4; .5 INJECTION, POWDER, FOR SOLUTION INTRAVENOUS at 13:33

## 2025-01-17 RX ADMIN — MIDAZOLAM HYDROCHLORIDE 1.5 MG: 1 INJECTION, SOLUTION INTRAMUSCULAR; INTRAVENOUS at 03:52

## 2025-01-17 RX ADMIN — ACETAMINOPHEN 1000 MG: 500 TABLET ORAL at 13:32

## 2025-01-17 RX ADMIN — NOREPINEPHRINE BITARTRATE 0.1 MCG/KG/MIN: 1 SOLUTION INTRAVENOUS at 02:39

## 2025-01-17 RX ADMIN — FUROSEMIDE 20 MG: 10 INJECTION, SOLUTION INTRAVENOUS at 03:06

## 2025-01-17 RX ADMIN — HYDROCORTISONE SODIUM SUCCINATE 100 MG: 100 INJECTION, POWDER, FOR SOLUTION INTRAMUSCULAR; INTRAVENOUS at 05:55

## 2025-01-17 RX ADMIN — PIPERACILLIN AND TAZOBACTAM 4.5 G: 4; .5 INJECTION, POWDER, FOR SOLUTION INTRAVENOUS at 06:00

## 2025-01-17 RX ADMIN — FENTANYL CITRATE 75 MCG: 50 INJECTION, SOLUTION INTRAMUSCULAR; INTRAVENOUS at 03:51

## 2025-01-17 RX ADMIN — ACETAMINOPHEN 1000 MG: 500 TABLET ORAL at 18:35

## 2025-01-17 RX ADMIN — HYDROCORTISONE SODIUM SUCCINATE 100 MG: 100 INJECTION, POWDER, FOR SOLUTION INTRAMUSCULAR; INTRAVENOUS at 21:22

## 2025-01-17 RX ADMIN — SODIUM CHLORIDE, POTASSIUM CHLORIDE, SODIUM LACTATE AND CALCIUM CHLORIDE: 600; 310; 30; 20 INJECTION, SOLUTION INTRAVENOUS at 08:45

## 2025-01-17 RX ADMIN — ENOXAPARIN SODIUM 40 MG: 100 INJECTION SUBCUTANEOUS at 18:35

## 2025-01-17 RX ADMIN — HYDROCORTISONE SODIUM SUCCINATE 100 MG: 100 INJECTION, POWDER, FOR SOLUTION INTRAMUSCULAR; INTRAVENOUS at 13:32

## 2025-01-17 RX ADMIN — MORPHINE SULFATE 2 MG: 4 INJECTION INTRAVENOUS at 01:33

## 2025-01-17 RX ADMIN — DIPHENHYDRAMINE HYDROCHLORIDE 25 MG: 50 INJECTION, SOLUTION INTRAMUSCULAR; INTRAVENOUS at 01:38

## 2025-01-17 RX ADMIN — LIDOCAINE HYDROCHLORIDE: 20 INJECTION, SOLUTION INFILTRATION; PERINEURAL at 03:40

## 2025-01-17 RX ADMIN — ACETAMINOPHEN 1000 MG: 500 TABLET ORAL at 05:55

## 2025-01-17 RX ADMIN — POTASSIUM CHLORIDE 40 MEQ: 400 INJECTION, SOLUTION INTRAVENOUS at 13:38

## 2025-01-17 RX ADMIN — PIPERACILLIN AND TAZOBACTAM 4.5 G: 4; .5 INJECTION, POWDER, FOR SOLUTION INTRAVENOUS at 21:23

## 2025-01-17 RX ADMIN — MAGNESIUM SULFATE HEPTAHYDRATE 4 G: 4 INJECTION, SOLUTION INTRAVENOUS at 03:11

## 2025-01-17 ASSESSMENT — PAIN DESCRIPTION - PAIN TYPE
TYPE: ACUTE PAIN

## 2025-01-17 NOTE — CONSULTS
CARDIOLOGY CONSULTATION NOTE      Reason of Consult: Bradycardia and hypotension, septic shock    Consulting Physician: Brandon Contreras MD    HPI:  59-year-old woman with history of ulcerative colitis , chronic anemia Hg ~ 11, hypothyroidism, who presented to the ER with 4 days of lower abdominal pain associated with nausea and anorexia, on Entyvio infusions, found to have acute appendicitis status post uncomplicated laparoscopic appendectomy 1/16 AM for acute suppurative appendicitis, with evidence of purulent fluid in the pelvis.    Also admits to myalgias and sore throat last few days.    Since yesterday afternoon through this early am she has bene bradycardic 30-40's with SBP 80's, lactate increased from 3.8 to 9.6, started on norepinephrine. She feels SOB at rest with a cough while lying in bed. +ve 5.3 L since admission. Hg 9 from 10.3. Was 11 4/2024.    Daughter at bedside  Non smoker    Personally reviewed EKGs and telemetry monitoring    Personally performed a bedside limited POCUS TTE in the TICU: preserved BiV functions, no pericardial effusion    Past Medical History:   Diagnosis Date    Bleeding from the nose     Blood transfusion without reported diagnosis     Hay fever     Hives     Measles     Mumps        Social History     Socioeconomic History    Marital status:      Spouse name: Not on file    Number of children: Not on file    Years of education: Not on file    Highest education level: Not on file   Occupational History    Not on file   Tobacco Use    Smoking status: Never    Smokeless tobacco: Never   Substance and Sexual Activity    Alcohol use: Yes     Alcohol/week: 0.0 oz     Comment: rare    Drug use: No    Sexual activity: Not Currently     Partners: Male   Other Topics Concern    Not on file   Social History Narrative    Not on file     Social Drivers of Health     Financial Resource Strain: Not on file   Food Insecurity: Not on file   Transportation Needs: Not on file    Physical Activity: Not on file   Stress: Not on file   Social Connections: Not on file   Intimate Partner Violence: Not on file   Housing Stability: Not on file       No current facility-administered medications on file prior to encounter.     Current Outpatient Medications on File Prior to Encounter   Medication Sig Dispense Refill    vedolizumab (ENTYVIO) 300 MG Recon Soln injection Infuse 300 mg into a venous catheter every 4 weeks.      VITAMIN D PO Take 1 Tablet by mouth every day.      acetaminophen (TYLENOL) 500 MG Tab Take 1,000 mg by mouth 3 times a day as needed for Mild Pain.      Calcium Carbonate (CALCIUM 600 HIGH POTENCY PO) Take 600 mg by mouth every day.      oxybutynin (DITROPAN) 5 MG Tab TAKE ONE TABLET BY MOUTH TWICE A DAY (DITROPAN) 60 Tab 4    levothyroxine (SYNTHROID) 88 MCG Tab TAKE ONE TABLET BY MOUTH EVERY MORNING ON AN EMPTY STOMACH (SYNTHROID) (Patient taking differently: Take 88 mcg by mouth every morning on an empty stomach.) 90 Tab 1    Mesalamine (APRISO) 0.375 GM CAPSULE SR 24 HR Take  by mouth 2 times a day. 1 cap BID  Per pt, weaning off      IRON PO Take 1 Tab by mouth every day.         Current Facility-Administered Medications   Medication Dose Frequency Provider Last Rate Last Admin    norepinephrine (Levophed) 8 mg in 250 mL NS infusion (premix)  0-1 mcg/kg/min (Ideal) Continuous Brandon Contreras M.D.        Respiratory Therapy Consult   Continuous RT Brandon Contreras M.D.        Pharmacy Consult Request ...Pain Management Review 1 Each  1 Each PHARMACY TO DOSE Brandon Contreras M.D.        ondansetron (Zofran) syringe/vial injection 4 mg  4 mg Q4HRS PRN Brandon Contreras M.D.        ondansetron (Zofran ODT) dispertab 4 mg  4 mg Q4HRS PRN Brandon Contreras M.D.        docusate sodium (Colace) capsule 100 mg  100 mg BID Brandon Contreras M.D.        senna-docusate (Pericolace Or Senokot S) 8.6-50 MG per tablet 1 Tablet  1 Tablet Nightly Brandon Contreras  M.D.   1 Tablet at 01/16/25 2023    senna-docusate (Pericolace Or Senokot S) 8.6-50 MG per tablet 1 Tablet  1 Tablet Q24HRS PRN Brandon Contreras M.D.        polyethylene glycol/lytes (Miralax) Packet 1 Packet  1 Packet BID Brandon Contreras M.D.        magnesium hydroxide (Milk Of Magnesia) suspension 30 mL  30 mL DAILY AT 1800 Brandon Contreras M.D.        bisacodyl (Dulcolax) suppository 10 mg  10 mg Q24HRS PRN Brandon Contreras M.D.        sodium phosphate enema 1 Enema  1 Enema Once PRN Brandon Contreras M.D.        LR infusion   Continuous Bob Ohara, A.P.N. 100 mL/hr at 01/16/25 1400 New Bag at 01/16/25 1400    acetaminophen (Tylenol) tablet 1,000 mg  1,000 mg Q6HRS Brandon Contreras M.D.   1,000 mg at 01/16/25 2318    Followed by    [START ON 1/21/2025] acetaminophen (Tylenol) tablet 1,000 mg  1,000 mg Q6HRS PRFISH Contreras M.D.        oxyCODONE immediate-release (Roxicodone) tablet 2.5 mg  2.5 mg Q3HRS PRFISH Contreras M.D.   2.5 mg at 01/16/25 2022    Or    oxyCODONE immediate-release (Roxicodone) tablet 5 mg  5 mg Q3HRS PRFISH Contreras M.D.        Or    morphine 4 MG/ML injection 2 mg  2 mg Q3HRS PRFISH Contreras M.D.   2 mg at 01/17/25 0133    piperacillin-tazobactam (Zosyn) 4.5 g in  mL IVPB  4.5 g Q8HRS Brandon Contreras M.D.   Stopped at 01/17/25 0030    acetaminophen (Tylenol) tablet 650 mg  650 mg Q4HRS PRFISH Contreras M.D.        Or    acetaminophen (Tylenol) suppository 650 mg  650 mg Q4HRS PRN Brandon Contreras M.D.        enoxaparin (Lovenox) inj 40 mg  40 mg DAILY AT 1800 MICHELLE GongNMichael   40 mg at 01/16/25 2023    hydrocortisone sodium succinate PF (Solu-CORTEF) 100 MG injection 100 mg  100 mg Q8HRS Brandon Contreras M.D.   100 mg at 01/16/25 2113   Last reviewed on 1/16/2025  8:31 AM by Lowell Andres     Sulfa drugs, Sulfasalazine, Latex, and Mesalamine    Family History   Problem Relation Age of  "Onset    Arthritis Neg Hx     Lung Disease Neg Hx     Genetic Disorder Neg Hx     Cancer Neg Hx     Psychiatric Illness Neg Hx     Diabetes Neg Hx     Heart Disease Neg Hx     Hypertension Neg Hx     Hyperlipidemia Neg Hx     Stroke Neg Hx     Alcohol/Drug Neg Hx        ROS: As per HPI all other systems reviewed and negative     Physical Exam   Blood pressure 116/69, pulse (!) 39, temperature 36.3 °C (97.4 °F), temperature source Temporal, resp. rate 18, height 1.549 m (5' 0.98\"), weight 60.6 kg (133 lb 9.6 oz), SpO2 98%, not currently breastfeeding.    Constitutional:  Appears ill  Neck: + JVD present.   Cardiovascular: bradycardic. Exam reveals no gallop and no friction rub. No murmur heard.   Pulmonary/Chest: rales in both bases  Musculoskeletal:  Pulses present.   Extremities: Exhibits no edema.   Skin: Skin is warm and dry.       Intake/Output Summary (Last 24 hours) at 1/17/2025 0231  Last data filed at 1/16/2025 2000  Gross per 24 hour   Intake 5306.25 ml   Output 5 ml   Net 5301.25 ml       Recent Labs     01/16/25  0426 01/16/25  1320 01/16/25 2035   WBC 8.8 8.1 7.0   RBC 4.37 3.56* 3.07*   HEMOGLOBIN 12.7 10.3* 9.0*   HEMATOCRIT 39.2 31.3* 28.6*   MCV 89.7 87.9 93.2   MCH 29.1 28.9 29.3   MCHC 32.4 32.9 31.5*   RDW 47.9 48.3 51.2*   PLATELETCT 307 225 186   MPV 8.8* 9.0 9.4     Recent Labs     01/16/25  0426   SODIUM 134*   POTASSIUM 3.8   CHLORIDE 99   CO2 22   GLUCOSE 130*   BUN 14   CREATININE 0.78   CALCIUM 9.7                         Imaging reviewed    Impressions:  # sinus bradycardia  # Junctional rhythm  # possible high degree AV block  # chronotropic incompetence  # post-op state  / lap appendectomy 1/16 AM  # septic shock, on pressors  # Hypervolemia    Recommendations:  - lasix 20mg IV once  - temporary pacemaker to assist with improved hemodynamics at faster HR (will set at VVI 80bpm). It is expected with her illness and septic shock state to have tachycardia rather than bradycardia with HR " 30-40's. See separate procedure note.  - TTE in am. Reassuring bedside POCUS  - check Thyroid studies    We will follow. Please contact mus with any questions.     Discussed with the referring physician and bedside nursing.    Please note that this dictation was created using voice recognition software. There may be errors I did not discover before finalizing the note.     Dmitry Mcgee MD, MPH [facility]  Interventional Cardiologist  [facility]   of Clinical Internal Medicine - [facility]

## 2025-01-17 NOTE — PROCEDURES
DATE OF OPERATION:  1/17/2025    PREOPERATIVE DIAGNOSIS: hemodynamic instability.    PROCEDURE PERFORMED: Right IJ central venous catheter placement.     SURGEON:   Brandon Contreras M.D.    INDICATIONS: The patient is a 59 year-old woman with hemodynamic instability. A central venous line is placed at the bedside.     PROCEDURE: Following informed consent consent, the patient was properly identified and optimally positioned. Intravenous analgesia was administered. The procedural site was prepped with ChloraPrep® and draped in a standard fashion. Full barrier precautions were employed. The patient was placed in shallow Trendelenburg position. The right IJ vein was accessed percutaneously using US guidance and a .032 flexible guide wire was advanced without resistance. A 7 Fr ARROWg+helio® Blue PLUS triple lumen catheter was passed using sterile Seldinger technique. The flexible guide wire was removed intact. The catheter was secured to the skin with silk sutures. A sterile dressing was applied. All ports flushed and aspirated freely.      The patient tolerated the procedure well. There were no apparent complications. A stat portable chest radiograph was ordered.       ____________________________________     Brandon Contreras M.D.    DD: 1/17/2025  2:37 AM

## 2025-01-17 NOTE — CARE PLAN
The patient is Watcher - Medium risk of patient condition declining or worsening    Shift Goals  Clinical Goals: Hemodynamic stability  Patient Goals: Rest, comfort  Family Goals: Updates    Progress made toward(s) clinical / shift goals:    Problem: Pain - Standard  Goal: Alleviation of pain or a reduction in pain to the patient’s comfort goal  Description: Target End Date:  Prior to discharge or change in level of care    Document on Vitals flowsheet    1.  Document pain using the appropriate pain scale per order or unit policy  2.  Educate and implement non-pharmacologic comfort measures (i.e. relaxation, distraction, massage, cold/heat therapy, etc.)  3.  Pain management medications as ordered  4.  Reassess pain after pain med administration per policy  5.  If opiods administered assess patient's response to pain medication is appropriate per POSS sedation scale  6.  Follow pain management plan developed in collaboration with patient and interdisciplinary team (including palliative care or pain specialists if applicable)  Outcome: Progressing     Problem: Knowledge Deficit - Standard  Goal: Patient and family/care givers will demonstrate understanding of plan of care, disease process/condition, diagnostic tests and medications  Description: Target End Date:  1-3 days or as soon as patient condition allows    Document in Patient Education    1.  Patient and family/caregiver oriented to unit, equipment, visitation policy and means for communicating concern  2.  Complete/review Learning Assessment  3.  Assess knowledge level of disease process/condition, treatment plan, diagnostic tests and medications  4.  Explain disease process/condition, treatment plan, diagnostic tests and medications  Outcome: Progressing     Problem: Fall Risk  Goal: Patient will remain free from falls  Description: Target End Date:  Prior to discharge or change in level of care    Document interventions on the [facility] Fall Risk  Assessment    1.  Assess for fall risk factors  2.  Implement fall precautions  Outcome: Progressing       Patient is not progressing towards the following goals: N/A

## 2025-01-17 NOTE — PROGRESS NOTES
Dr. Contreras at bedside. Repeat lactic ordered following 500 cc bolus. Tripathi insertion ordered for strict I&Os.

## 2025-01-17 NOTE — PROGRESS NOTES
0032 Patient noted to be in junctional rhythm, previously sinus bradycardic 30-50s.\    STAT EKG ordered.    EKG showing junctional rhythm. Dr. Contreras notified. Trop, Mag, Phos ordered. Cardiology consulted. Echo ordered.

## 2025-01-17 NOTE — ASSESSMENT & PLAN NOTE
Postoperative hypotension refractory to 1.5 L fluid bolus.  Abdominal ultrasound with no significant free fluid.  Hemoglobin 10.3. Cortisol 10.9.

## 2025-01-17 NOTE — PROGRESS NOTES
Trauma / Surgical Daily Progress Note    Date of Service  1/17/2025    Chief Complaint  59 y.o. female admitted 1/16/2025 with Abdominal pain    Interval Events  1/16/2025 Laparoscopic appendectomy   Mira Ramirez receiving care in the surgical ICU  Pacing   cardiac monitoring  Tolerating diet  Pain control adequate     Critically ill  Requires continued ICU and hospital admission  Seen on rounds and discussed with multidisciplinary team  Injuries and physiologic derangements pose a significant risk of mortality and long term morbidity  Critical care interventions include:  integration of multiple data points and associated complex medical decisions      Vital Signs for last 24 hours  Temp:  [36.3 °C (97.4 °F)] 36.3 °C (97.4 °F)  Pulse:  [35-81] 81  Resp:  [11-35] 35  BP: ()/(50-80) 96/75  SpO2:  [92 %-100 %] 92 %    Hemodynamic parameters for last 24 hours       Respiratory Data     Respiration: (!) 35, Pulse Oximetry: 92 %        RUL Breath Sounds: Crackles, RML Breath Sounds: Crackles, RLL Breath Sounds: Crackles, ZACK Breath Sounds: Crackles, LLL Breath Sounds: Crackles    Physical Exam  Physical Exam  Vitals and nursing note reviewed.   Constitutional:       General: She is not in acute distress.  HENT:      Head: Normocephalic.   Cardiovascular:      Comments: Paced skin warm palpable pulse  Pulmonary:      Effort: Pulmonary effort is normal. No respiratory distress.   Abdominal:      Palpations: Abdomen is soft.      Comments: Appropriate incisional tenderness   Skin:     General: Skin is warm and dry.   Neurological:      Mental Status: She is alert.         Laboratory  Recent Results (from the past 24 hours)   EKG    Collection Time: 01/16/25  6:50 PM   Result Value Ref Range    Report       [facility]    Test Date:  2025-01-16  Pt Name:    MIRA RAMIREZ             Department: [facility]  MRN:        [id number]                      Room:       T932  Gender:     Female                        Technician: GABRIELLE  :        1965                   Requested By:MONICA CEDILLO  Order #:    [id number]                    Reading MD: Dmitry Mcgee MD    Measurements  Intervals                                Axis  Rate:       44                           P:          17  LA:         202                          QRS:        2  QRSD:       79                           T:          21  QT:         443  QTc:        379    Interpretive Statements  Sinus bradycardia 44bpm  low voltage, extremity leads  Abnormal R-wave progression, early transition  Compared to ECG 2025 08:14:13  Sinus tachycardia no longer present  Electronically Signed On 2025 18:50:56 PST by Dmitry Mcgee MD     CBC WITHOUT DIFFERENTIAL    Collection Time: 25  8:35 PM   Result Value Ref Range    WBC 7.0 4.8 - 10.8 K/uL    RBC 3.07 (L) 4.20 - 5.40 M/uL    Hemoglobin 9.0 (L) 12.0 - 16.0 g/dL    Hematocrit 28.6 (L) 37.0 - 47.0 %    MCV 93.2 81.4 - 97.8 fL    MCH 29.3 27.0 - 33.0 pg    MCHC 31.5 (L) 32.2 - 35.5 g/dL    RDW 51.2 (H) 35.9 - 50.0 fL    Platelet Count 186 164 - 446 K/uL    MPV 9.4 9.0 - 12.9 fL   LACTIC ACID    Collection Time: 25  8:35 PM   Result Value Ref Range    Lactic Acid 3.8 (H) 0.5 - 2.0 mmol/L   LACTIC ACID    Collection Time: 25 11:14 PM   Result Value Ref Range    Lactic Acid 9.6 (HH) 0.5 - 2.0 mmol/L   TROPONIN    Collection Time: 25  1:03 AM   Result Value Ref Range    Troponin T 8 6 - 19 ng/L   MAGNESIUM    Collection Time: 25  1:03 AM   Result Value Ref Range    Magnesium 1.4 (L) 1.5 - 2.5 mg/dL   PHOSPHORUS    Collection Time: 25  1:03 AM   Result Value Ref Range    Phosphorus 2.9 2.5 - 4.5 mg/dL   TSH WITH REFLEX TO FT4    Collection Time: 25  1:03 AM   Result Value Ref Range    TSH 0.242 (L) 0.380 - 5.330 uIU/mL   FREE THYROXINE    Collection Time: 25  1:03 AM   Result Value Ref Range    Free T-4 1.42 0.93 - 1.70 ng/dL   T3 FREE    Collection Time: 25  1:03  AM   Result Value Ref Range    T3,Free 1.29 (L) 2.00 - 4.40 pg/mL   BLOOD CULTURE    Collection Time: 01/17/25  2:30 AM    Specimen: Peripheral; Blood   Result Value Ref Range    Significant Indicator NEG     Source BLD     Site PERIPHERAL     Culture Result       No Growth  Note: Blood cultures are incubated for 5 days and  are monitored continuously.Positive blood cultures  are called to the RN and reported as soon as  they are identified.     BLOOD CULTURE    Collection Time: 01/17/25  2:30 AM    Specimen: Peripheral; Blood   Result Value Ref Range    Significant Indicator NEG     Source BLD     Site PERIPHERAL     Culture Result       No Growth  Note: Blood cultures are incubated for 5 days and  are monitored continuously.Positive blood cultures  are called to the RN and reported as soon as  they are identified.     CBC with Differential: Tomorrow AM    Collection Time: 01/17/25  3:15 AM   Result Value Ref Range    WBC 8.8 4.8 - 10.8 K/uL    RBC 3.64 (L) 4.20 - 5.40 M/uL    Hemoglobin 10.4 (L) 12.0 - 16.0 g/dL    Hematocrit 33.0 (L) 37.0 - 47.0 %    MCV 90.7 81.4 - 97.8 fL    MCH 28.6 27.0 - 33.0 pg    MCHC 31.5 (L) 32.2 - 35.5 g/dL    RDW 49.9 35.9 - 50.0 fL    Platelet Count 247 164 - 446 K/uL    MPV 9.3 9.0 - 12.9 fL    Neutrophils-Polys 77.60 (H) 44.00 - 72.00 %    Lymphocytes 16.20 (L) 22.00 - 41.00 %    Monocytes 4.50 0.00 - 13.40 %    Eosinophils 0.00 0.00 - 6.90 %    Basophils 0.10 0.00 - 1.80 %    Immature Granulocytes 1.60 (H) 0.00 - 0.90 %    Nucleated RBC 0.00 0.00 - 0.20 /100 WBC    Neutrophils (Absolute) 6.84 1.82 - 7.42 K/uL    Lymphs (Absolute) 1.43 1.00 - 4.80 K/uL    Monos (Absolute) 0.40 0.00 - 0.85 K/uL    Eos (Absolute) 0.00 0.00 - 0.51 K/uL    Baso (Absolute) 0.01 0.00 - 0.12 K/uL    Immature Granulocytes (abs) 0.14 (H) 0.00 - 0.11 K/uL    NRBC (Absolute) 0.00 K/uL   Basic Metabolic Panel (BMP): Tomorrow AM    Collection Time: 01/17/25  3:15 AM   Result Value Ref Range    Sodium 138 135 -  145 mmol/L    Potassium 4.0 3.6 - 5.5 mmol/L    Chloride 103 96 - 112 mmol/L    Co2 21 20 - 33 mmol/L    Glucose 187 (H) 65 - 99 mg/dL    Bun 13 8 - 22 mg/dL    Creatinine 0.53 0.50 - 1.40 mg/dL    Calcium 8.9 8.5 - 10.5 mg/dL    Anion Gap 14.0 7.0 - 16.0   LACTIC ACID    Collection Time: 25  3:15 AM   Result Value Ref Range    Lactic Acid 1.1 0.5 - 2.0 mmol/L   ESTIMATED GFR    Collection Time: 25  3:15 AM   Result Value Ref Range    GFR (CKD-EPI) 105 >60 mL/min/1.73 m 2   EKG    Collection Time: 25  3:23 AM   Result Value Ref Range    Report       [facility]    Test Date:  2025  Pt Name:    BUBBA MULLINS             Department: [facility]  MRN:        [id number]                      Room:       [facility]  Gender:     Female                       Technician: RANDY  :        1965                   Requested By:JOSEMANUEL OSWALD  Order #:    [id number]                    Reading MD: Dmitry Mcgee MD    Measurements  Intervals                                Axis  Rate:       38                           P:          0  NC:         0                            QRS:        -1  QRSD:       102                          T:          32  QT:         493  QTc:        392    Interpretive Statements  sinus bradycardia with junctional rhythm  possible high degree AV block  Low voltage, precordial leads  Compared to ECG 2025 16:55:17  junctional rhythm with more bradycardia noted  Electronically Signed On 2025 03:23:14 PST by Dmitry Mcgee MD     EC-ECHOCARDIOGRAM COMPLETE W/O CONT    Collection Time: 25  9:25 AM   Result Value Ref Range    Eject.Frac. MOD 4C 56.3     Eject.Frac. MOD 2C 55     Left Ventrical Ejection Fraction 56    Basic Metabolic Panel    Collection Time: 25 10:42 AM   Result Value Ref Range    Sodium 141 135 - 145 mmol/L    Potassium 3.6 3.6 - 5.5 mmol/L    Chloride 103 96 - 112 mmol/L    Co2 26 20 - 33 mmol/L    Glucose 159 (H) 65 - 99 mg/dL    Bun 13 8 - 22  mg/dL    Creatinine 0.58 0.50 - 1.40 mg/dL    Calcium 8.5 8.5 - 10.5 mg/dL    Anion Gap 12.0 7.0 - 16.0   MAGNESIUM    Collection Time: 01/17/25 10:42 AM   Result Value Ref Range    Magnesium 2.5 1.5 - 2.5 mg/dL   PHOSPHORUS    Collection Time: 01/17/25 10:42 AM   Result Value Ref Range    Phosphorus 3.0 2.5 - 4.5 mg/dL   ESTIMATED GFR    Collection Time: 01/17/25 10:42 AM   Result Value Ref Range    GFR (CKD-EPI) 103 >60 mL/min/1.73 m 2       Fluids    Intake/Output Summary (Last 24 hours) at 1/17/2025 1528  Last data filed at 1/17/2025 1200  Gross per 24 hour   Intake 6028.94 ml   Output 2875 ml   Net 3153.94 ml       Core Measures & Quality Metrics  Core Measures & Quality Metrics  LONNIE Score  ETOH Screening    Assessment/Plan  Hypotension- (present on admission)  Assessment & Plan  Postoperative hypotension refractory to 1.5 L fluid bolus.  Abdominal ultrasound with no significant free fluid.  Hemoglobin 10.3. Cortisol 10.9.     Appendicitis, acute  Assessment & Plan  1/16 Laparoscopic appendectomy.  Superlatives appendicitis.    Arrhythmia  Assessment & Plan  Temporary pacing  Follow-up interventional cardiology evaluation recommendations   continue close monitoring          Discussed patient condition with Family, RN, RT, Therapies, Pharmacy, Dietary, and Charge nurse / hot rounds.  CRITICAL CARE TIME EXCLUDING PROCEDURES: 45    minutes

## 2025-01-17 NOTE — PROCEDURES
Temporary transvenous pacemaker insertion    : Dmitry Carlson MD, MPH    INDICATION:  Significant bradycardia and junctional rhythm    SEDATION AND DURATION:  Conscious sedation was administered by trained personnel, supervised by myself between 0336AM and 0351AM using fentanyl and Versed.  The patient tolerated sedation well.    PROCEDURE DESCRIPTION:  The superficial tissue overlying the LEFT neck was prepped and draped in usual sterile fashion. Subsequently, the area was infiltrated with lidocaine solution. Next, the LEFT internal jugular vein was cannulated using dynamic ultrasound and micropuncture technique.  I then introduced a 6 Syrian sheath and floated a 5 Syrian balloon tipped temporary pacemaker catheter into the right ventricle.  Appropriate pacing and sensing parameters were obtained and settings are as follows ( backup rate at 80 bpm, 5mA,).    EBL:   None    Complications:   None apparent    IMPRESSION:  Successful insertion of temporary, transvenous pacemaker, secured at 43 cm. Set at VVI 80 bpm, 5mA.    NOTIFICATION:  The patient's daughter was notified of the results of the procedure.    Dr. Contreras updated    Dmitry Carlson MD, MPH [facility]  Interventional Cardiologist  [facility]   of Clinical Internal Medicine - [facility] Amilcar COELHO

## 2025-01-17 NOTE — PROGRESS NOTES
Cardiology at bedside. Rate dropped from 80 to 60. BP cycled. If BP stable decrease rate to 40 at 1600.

## 2025-01-17 NOTE — PROGRESS NOTES
Brief cardiology update:    59-year-old female patient, admitted for septic shock secondary to acute suppurative appendicitis S/P lap appendectomy on 1/16/2025, postop complicated by hemodynamic instability secondary to sinus bradycardia/junctional bradycardia. Initial Cardiology consult done by Dr. Mcgee earlier today with placement of temporary transvenous pacemaker.    Patient evaluated at bedside by myself and Dr. Bustos on cardiology rounds today.    Patient doing well, denies any chest pain or palpitations, or orthopnea.  Vitals better.  Blood pressure improved and is off Levophed since this morning around 7 AM.    Pacemaker briefly turned off during rounds around 10:45 AM, heart rate dropped to 40s with sinus bradycardia on telemetry which is better than this morning when it had dropped to the 30s per primary RN.    Recommend reducing VVI from 80 to 60 bpm.  Monitor hemodynamics, can reduce further to 40 late afternoon if blood pressure remains stable.  Okay to increase if any hemodynamic instability.    Awaiting TTE results.      Thank you for the consult.  IP cardiology team will continue to follow the patient.  Current assessment and POC discussed with patient ,primary RN at bedside, and Dr. Bustos.    Dr. Cate Aguillon MD   [facility] IM PGY 3 Resident    Please refer to Dr. Bustos's attestation for additional comments/recommendations.

## 2025-01-17 NOTE — PROGRESS NOTES
0230: Dr Contreras at blood cultures ordered. Levo initiated.   4G Mag ordered, PRN atropine ordered, repeat lactic ordered.   0250: Cardiology at bedside to assess patient.     Patient to go to cath lab this AM for transvenous pacing catheter placement..  20 mg lasix ordered.     0318 Patient transported to cath lab on zoll monitor w/ this RN and CIC RRT.     0412 Patient returned to T932 from cath lab w/ transvenous pacing in place. CIC RRT at bedside.

## 2025-01-17 NOTE — PROGRESS NOTES
"  DATE: 1/17/2025    Post Operative Day  1 Lap appendectomy.    INTERVAL EVENTS:  Post op lap appy  Course complicated by hemodynamically unstable bradycardia  Cardiology evaluated and placed transvenous pacer  100% capture, underlying rate is 30bpm after disconnect today.    PHYSICAL EXAMINATION:  Vital Signs: /74   Pulse 80   Temp 36.3 °C (97.4 °F) (Temporal)   Resp (!) 11   Ht 1.549 m (5' 0.98\")   Wt 60.6 kg (133 lb 9.6 oz)   SpO2 95%     Resting comfortably  Minimal abdominal tenderness  Incisions well approximated.    LABORATORY VALUES:   Recent Labs     01/16/25  1320 01/16/25  2035 01/17/25  0315   WBC 8.1 7.0 8.8   RBC 3.56* 3.07* 3.64*   HEMOGLOBIN 10.3* 9.0* 10.4*   HEMATOCRIT 31.3* 28.6* 33.0*   MCV 87.9 93.2 90.7   MCH 28.9 29.3 28.6   MCHC 32.9 31.5* 31.5*   RDW 48.3 51.2* 49.9   PLATELETCT 225 186 247   MPV 9.0 9.4 9.3     Recent Labs     01/16/25  0426 01/17/25  0315   SODIUM 134* 138   POTASSIUM 3.8 4.0   CHLORIDE 99 103   CO2 22 21   GLUCOSE 130* 187*   BUN 14 13   CREATININE 0.78 0.53   CALCIUM 9.7 8.9     Recent Labs     01/16/25  0426   ASTSGOT 24   ALTSGPT 12   TBILIRUBIN 0.7   ALKPHOSPHAT 73   GLOBULIN 3.6*            IMAGING:   Reviewed     ASSESSMENT AND PLAN:  POD1 Lap appy complicated by post-operative unstable bradycardia  Now s/p transvenous pacer  Underlying rhythm this AM is sinus with rate 30 bpm    Reviewed predisposing/risk factors.  Thyroid labs checked this admission WNL except mild low T3 unlikely to cause.  No beta blocking medications.  Hypomagnesemia present, supplemented, will recheck today.    Follow up plan from cardiology regarding spontaneous recovery potential vs permanent pacemaker    Continue Zosyn until POD 4, then if clinically acceptable course transition to Augmentin    Okay for diet and Lovenox today from surgical perspective but may be worthwhile to check in with cards about PPM scheduling    Appreciate ICU and consulting physician care.       " ____________________________________     Haroon Prajapati M.D.    DD: 1/17/2025  7:29 AM

## 2025-01-17 NOTE — PROGRESS NOTES
4 Eyes Skin Assessment Completed by NAVIN Singh and NAVIN Fox.    Head WDL  Ears Redness, redness on L. Ear, blanching  Nose WDL  Mouth WDL  Neck WDL  Breast/Chest WDL  Shoulder Blades WDL  Spine WDL  (R) Arm/Elbow/Hand WDL  (L) Arm/Elbow/Hand Redness and Blanching  Abdomen Incision, lap stabs x4  Groin WDL  Scrotum/Coccyx/Buttocks WDL  (R) Leg WDL  (L) Leg WDL  (R) Heel/Foot/Toe Discoloration, dry, calloused, cracked- patient c/o pain  (L) Heel/Foot/Toe WDL, dry      Devices In Places ECG, Blood Pressure Cuff, Pulse Ox, and SCD's      Interventions In Place Gray Ear Foams, TAP System, Pillows, and Q2 Turns    Possible Skin Injury No    Pictures Uploaded Into Epic yes  Wound Consult Placed Yes  RN Wound Prevention Protocol Ordered Yes

## 2025-01-17 NOTE — ASSESSMENT & PLAN NOTE
1/17 Temporary transvenous pacemaker placed for symptomatic bradycardia.  1/19 Paced rhythm.   1/21 Electrophysiologist to see.  Dmitry Eduardo M.D., Interventional Radiology.

## 2025-01-17 NOTE — CARE PLAN
The patient is Stable - Low risk of patient condition declining or worsening    Shift Goals  Clinical Goals: Rest  Patient Goals: Rest  Family Goals: Rest    Progress made toward(s) clinical / shift goals:    Problem: Pain - Standard  Goal: Alleviation of pain or a reduction in pain to the patient’s comfort goal  Outcome: Progressing     Problem: Knowledge Deficit - Standard  Goal: Patient and family/care givers will demonstrate understanding of plan of care, disease process/condition, diagnostic tests and medications  Outcome: Progressing     Problem: Fall Risk  Goal: Patient will remain free from falls  Outcome: Progressing

## 2025-01-18 PROBLEM — R73.9 HYPERGLYCEMIA: Status: ACTIVE | Noted: 2025-01-18

## 2025-01-18 LAB
ANION GAP SERPL CALC-SCNC: 11 MMOL/L (ref 7–16)
BASOPHILS # BLD AUTO: 0 % (ref 0–1.8)
BASOPHILS # BLD: 0 K/UL (ref 0–0.12)
BUN SERPL-MCNC: 18 MG/DL (ref 8–22)
CALCIUM SERPL-MCNC: 8.5 MG/DL (ref 8.5–10.5)
CHLORIDE SERPL-SCNC: 106 MMOL/L (ref 96–112)
CO2 SERPL-SCNC: 22 MMOL/L (ref 20–33)
CREAT SERPL-MCNC: 0.47 MG/DL (ref 0.5–1.4)
EOSINOPHIL # BLD AUTO: 0 K/UL (ref 0–0.51)
EOSINOPHIL NFR BLD: 0 % (ref 0–6.9)
ERYTHROCYTE [DISTWIDTH] IN BLOOD BY AUTOMATED COUNT: 51.1 FL (ref 35.9–50)
GFR SERPLBLD CREATININE-BSD FMLA CKD-EPI: 108 ML/MIN/1.73 M 2
GLUCOSE SERPL-MCNC: 157 MG/DL (ref 65–99)
HCT VFR BLD AUTO: 31.2 % (ref 37–47)
HGB BLD-MCNC: 9.7 G/DL (ref 12–16)
IMM GRANULOCYTES # BLD AUTO: 0.03 K/UL (ref 0–0.11)
IMM GRANULOCYTES NFR BLD AUTO: 0.4 % (ref 0–0.9)
LYMPHOCYTES # BLD AUTO: 1.2 K/UL (ref 1–4.8)
LYMPHOCYTES NFR BLD: 14.7 % (ref 22–41)
MCH RBC QN AUTO: 28.5 PG (ref 27–33)
MCHC RBC AUTO-ENTMCNC: 31.1 G/DL (ref 32.2–35.5)
MCV RBC AUTO: 91.8 FL (ref 81.4–97.8)
MONOCYTES # BLD AUTO: 0.35 K/UL (ref 0–0.85)
MONOCYTES NFR BLD AUTO: 4.3 % (ref 0–13.4)
NEUTROPHILS # BLD AUTO: 6.58 K/UL (ref 1.82–7.42)
NEUTROPHILS NFR BLD: 80.6 % (ref 44–72)
NRBC # BLD AUTO: 0 K/UL
NRBC BLD-RTO: 0 /100 WBC (ref 0–0.2)
PLATELET # BLD AUTO: 235 K/UL (ref 164–446)
PMV BLD AUTO: 10 FL (ref 9–12.9)
POTASSIUM SERPL-SCNC: 4.2 MMOL/L (ref 3.6–5.5)
RBC # BLD AUTO: 3.4 M/UL (ref 4.2–5.4)
SODIUM SERPL-SCNC: 139 MMOL/L (ref 135–145)
WBC # BLD AUTO: 8.2 K/UL (ref 4.8–10.8)

## 2025-01-18 PROCEDURE — A9270 NON-COVERED ITEM OR SERVICE: HCPCS | Performed by: STUDENT IN AN ORGANIZED HEALTH CARE EDUCATION/TRAINING PROGRAM

## 2025-01-18 PROCEDURE — 99233 SBSQ HOSP IP/OBS HIGH 50: CPT | Performed by: INTERNAL MEDICINE

## 2025-01-18 PROCEDURE — 770000 HCHG ROOM/CARE - INTERMEDIATE ICU *

## 2025-01-18 PROCEDURE — 99223 1ST HOSP IP/OBS HIGH 75: CPT | Performed by: INTERNAL MEDICINE

## 2025-01-18 PROCEDURE — 700105 HCHG RX REV CODE 258: Performed by: STUDENT IN AN ORGANIZED HEALTH CARE EDUCATION/TRAINING PROGRAM

## 2025-01-18 PROCEDURE — 80048 BASIC METABOLIC PNL TOTAL CA: CPT

## 2025-01-18 PROCEDURE — 700102 HCHG RX REV CODE 250 W/ 637 OVERRIDE(OP): Performed by: STUDENT IN AN ORGANIZED HEALTH CARE EDUCATION/TRAINING PROGRAM

## 2025-01-18 PROCEDURE — 700111 HCHG RX REV CODE 636 W/ 250 OVERRIDE (IP): Mod: JZ | Performed by: STUDENT IN AN ORGANIZED HEALTH CARE EDUCATION/TRAINING PROGRAM

## 2025-01-18 PROCEDURE — 700105 HCHG RX REV CODE 258: Performed by: SURGERY

## 2025-01-18 PROCEDURE — 700111 HCHG RX REV CODE 636 W/ 250 OVERRIDE (IP): Mod: JZ | Performed by: NURSE PRACTITIONER

## 2025-01-18 PROCEDURE — 85025 COMPLETE CBC W/AUTO DIFF WBC: CPT

## 2025-01-18 RX ORDER — SODIUM CHLORIDE 9 MG/ML
1000 INJECTION, SOLUTION INTRAVENOUS ONCE
Status: COMPLETED | OUTPATIENT
Start: 2025-01-18 | End: 2025-01-18

## 2025-01-18 RX ADMIN — PIPERACILLIN AND TAZOBACTAM 4.5 G: 4; .5 INJECTION, POWDER, FOR SOLUTION INTRAVENOUS at 12:49

## 2025-01-18 RX ADMIN — ACETAMINOPHEN 1000 MG: 500 TABLET ORAL at 18:15

## 2025-01-18 RX ADMIN — PIPERACILLIN AND TAZOBACTAM 4.5 G: 4; .5 INJECTION, POWDER, FOR SOLUTION INTRAVENOUS at 21:44

## 2025-01-18 RX ADMIN — HYDROCORTISONE SODIUM SUCCINATE 100 MG: 100 INJECTION, POWDER, FOR SOLUTION INTRAMUSCULAR; INTRAVENOUS at 05:30

## 2025-01-18 RX ADMIN — ACETAMINOPHEN 1000 MG: 500 TABLET ORAL at 12:45

## 2025-01-18 RX ADMIN — SODIUM CHLORIDE 1000 ML: 9 INJECTION, SOLUTION INTRAVENOUS at 00:12

## 2025-01-18 RX ADMIN — PIPERACILLIN AND TAZOBACTAM 4.5 G: 4; .5 INJECTION, POWDER, FOR SOLUTION INTRAVENOUS at 05:34

## 2025-01-18 RX ADMIN — ENOXAPARIN SODIUM 40 MG: 100 INJECTION SUBCUTANEOUS at 18:15

## 2025-01-18 ASSESSMENT — ENCOUNTER SYMPTOMS
SENSORY CHANGE: 0
SPUTUM PRODUCTION: 0
FOCAL WEAKNESS: 0
MYALGIAS: 0
FEVER: 0
COUGH: 0
CONSTIPATION: 0
VOMITING: 0
STRIDOR: 0
BACK PAIN: 0
ORTHOPNEA: 0
NAUSEA: 0
TREMORS: 0
HEADACHES: 0
DOUBLE VISION: 0
SPEECH CHANGE: 0
DIZZINESS: 0
WEIGHT LOSS: 0
ABDOMINAL PAIN: 0
HALLUCINATIONS: 0
BLURRED VISION: 0
PHOTOPHOBIA: 0
PALPITATIONS: 0
CHILLS: 0
SHORTNESS OF BREATH: 0
TINGLING: 0
DIARRHEA: 0
EYE PAIN: 0
NECK PAIN: 0
WHEEZING: 0

## 2025-01-18 ASSESSMENT — PAIN DESCRIPTION - PAIN TYPE
TYPE: ACUTE PAIN

## 2025-01-18 ASSESSMENT — LIFESTYLE VARIABLES: SUBSTANCE_ABUSE: 0

## 2025-01-18 NOTE — PROGRESS NOTES
"  DATE: 1/18/2025    Post Operative Day  1 Lap appendectomy.    INTERVAL EVENTS:  Post op lap appy  Course complicated by hemodynamically unstable bradycardia  Cardiology evaluated and placed transvenous pacer  Abdominal exam reassuring  Tolerating liquids  Loose BM    PHYSICAL EXAMINATION:  Vital Signs: /65   Pulse (!) 40   Temp 36.3 °C (97.4 °F) (Temporal)   Resp 20   Ht 1.549 m (5' 0.98\")   Wt 60.6 kg (133 lb 9.6 oz)   SpO2 96%     Resting comfortably  Minimal abdominal tenderness  Incisions well approximated.    LABORATORY VALUES:   Recent Labs     01/16/25 2035 01/17/25  0315 01/18/25  0347   WBC 7.0 8.8 8.2   RBC 3.07* 3.64* 3.40*   HEMOGLOBIN 9.0* 10.4* 9.7*   HEMATOCRIT 28.6* 33.0* 31.2*   MCV 93.2 90.7 91.8   MCH 29.3 28.6 28.5   MCHC 31.5* 31.5* 31.1*   RDW 51.2* 49.9 51.1*   PLATELETCT 186 247 235   MPV 9.4 9.3 10.0     Recent Labs     01/17/25 0315 01/17/25  1042 01/18/25  0347   SODIUM 138 141 139   POTASSIUM 4.0 3.6 4.2   CHLORIDE 103 103 106   CO2 21 26 22   GLUCOSE 187* 159* 157*   BUN 13 13 18   CREATININE 0.53 0.58 0.47*   CALCIUM 8.9 8.5 8.5     Recent Labs     01/16/25  0426   ASTSGOT 24   ALTSGPT 12   TBILIRUBIN 0.7   ALKPHOSPHAT 73   GLOBULIN 3.6*            IMAGING:   Reviewed     ASSESSMENT AND PLAN:  POD2 Lap appy complicated by post-operative unstable bradycardia  Now s/p transvenous pacer, set to rate of 40, intrinsic rate appears to be in the 30-40 bpm range still.        Continue Zosyn until POD 4, then if clinically acceptable course transition to Augmentin    Advance to regular diet    Appreciate ICU and consulting physician care.       ____________________________________     Haroon Prajapati M.D.    DD: 1/17/2025  7:29 AM    "

## 2025-01-18 NOTE — PROGRESS NOTES
Urine output for past four hours following 1L NS measuring 15-20 cc/ hour. Dr. Ontiveros notified.

## 2025-01-18 NOTE — PROGRESS NOTES
[facility] Acceptance Note    Called by trauma service for transfer to [facility] for symptomatic bradycardia post anesthesia for laparoscopic appendectomy now with transvenous pacer in place.  The patient is a 59-year-old female, history of ulcerative colitis on Entyvio infusions, chronic anemia, hypothyroidism who presented with 4 days of abdominal pain nausea and anorexia.  She was found to have acute suppurative appendicitis and underwent laparoscopic appendectomy 1/16.  She has had symptomatic bradycardia since anesthesia, down into the 30-40 range and lactic acid increased to 9.6.  Patient was started on norepinephrine infusion and early this morning underwent temporary transvenous pacemaker placement by cardiology.  Currently has underlying junctional bradycardia 30-40, transvenous pacer has now been turned down to 40 and she is intermittently pacing.  She is currently off vasopressors, not in distress complaints of some cough and mild abdominal discomfort.  Currently no distress, lactic acid down to 1.    Her daughter is at bedside provides some additional history.  The patient and her daughter both tell me that she has had some problems with anesthesia at least a couple times in the past.  In December 2023 she underwent a left eyelid surgery at [facility] and was told she had some cardiac problems with anesthesia that almost precluded completing surgery.  It is not well-documented in epic that I can find.  Her aunt recalls prior cardiac issues with at least 1 other anesthesia episode in the past although not clearly delineated.    Cardiology is following.  Pacemaker has been turned down to 40 and she is intermittently pacing and relatively asymptomatic.  Appropriate for transfer to [facility] under the care of the hospitalist and cardiology services.  She remains on IV antibiotics with plan as per surgical recommendations.  Critical care available immediately if needed.

## 2025-01-18 NOTE — PROGRESS NOTES
Cardiology Follow Up Progress Note    Date of Service  1/18/25    Attending Physician  Brandon Contreras M.D.    Chief Complaint     Bradycardia& Hypotension post anesthesia after lap appendectomy.  Status post transvenous pacing.    HPI  Mira Ramirez is a 59 y.o. female admitted 1/16/2025 with abdominal pain, nausea/anorexia x 4 days.  Found acute appendicitis underwent laparoscopy appendectomy 1/16/2025.    PMH: Ulcerative colitis, chronic anemia, hypothyroid      Interim Events    No overnight cardiac events  Telemetry-HR 40 at rest, 70s with exertion.  Status post transvenous pacing 1/17/25.    Will keep TVP in one more day, will evaluate possible removal in the morning.  Review of Systems  Review of Systems   Respiratory:  Negative for cough, shortness of breath, wheezing and stridor.        Vital signs in last 24 hours  Temp:  [36.3 °C (97.4 °F)] 36.3 °C (97.4 °F)  Pulse:  [35-81] 81  Resp:  [11-35] 35  BP: ()/(50-80) 96/75  SpO2:  [92 %-100 %] 92 %    Physical Exam  Physical Exam  Cardiovascular:      Rate and Rhythm: Bradycardia present.      Pulses: Normal pulses.      Comments: HR 40 at rest , 70 with exertion  Pulmonary:      Effort: Pulmonary effort is normal.   Skin:     General: Skin is warm.   Neurological:      Mental Status: She is alert. Mental status is at baseline.   Psychiatric:         Mood and Affect: Mood normal.         Lab Review  Lab Results   Component Value Date/Time    WBC 8.8 01/17/2025 03:15 AM    RBC 3.64 (L) 01/17/2025 03:15 AM    HEMOGLOBIN 10.4 (L) 01/17/2025 03:15 AM    HEMATOCRIT 33.0 (L) 01/17/2025 03:15 AM    MCV 90.7 01/17/2025 03:15 AM    MCH 28.6 01/17/2025 03:15 AM    MCHC 31.5 (L) 01/17/2025 03:15 AM    MPV 9.3 01/17/2025 03:15 AM      Lab Results   Component Value Date/Time    SODIUM 141 01/17/2025 10:42 AM    POTASSIUM 3.6 01/17/2025 10:42 AM    CHLORIDE 103 01/17/2025 10:42 AM    CO2 26 01/17/2025 10:42 AM    GLUCOSE 159 (H) 01/17/2025 10:42 AM  "   BUN 13 01/17/2025 10:42 AM    CREATININE 0.58 01/17/2025 10:42 AM    GLOMRATE 74 04/10/2023 05:22 PM      Lab Results   Component Value Date/Time    ASTSGOT 24 01/16/2025 04:26 AM    ALTSGPT 12 01/16/2025 04:26 AM     Lab Results   Component Value Date/Time    CHOLSTRLTOT 178 09/26/2024 02:31 PM    LDL 91 09/26/2024 02:31 PM    HDL 44 09/26/2024 02:31 PM    TRIGLYCERIDE 217 (H) 09/26/2024 02:31 PM    TROPONINT 8 01/17/2025 01:03 AM       No results for input(s): \"NTPROBNP\" in the last 72 hours.    Cardiac Imaging and Procedures Review  EKG:  high degree AVB      Echocardiogram:    CONCLUSIONS  No prior study is available for comparison.   Normal left ventricular systolic function.   No evidence of valvular abnormality based on Doppler evaluation.           Imaging  Chest X-Ray:     Left basilar atelectasis, no focal infiltrate            Assessment/Plan    Septic shock  -S/p lap appendectomy 1/16/2025  -suppurative appendicitis.  -Hemodynamic instability with hypotension & bradycardia post anesthesia for lap appendectomy.  -S/P transvenous pacing 1/17/2025.  -Heart rate with exertion 70, at rest 40  -Keep TVP another day will evaluate removal tomorrow morning.  -Echo reassuring.    Cardiology will follow along.  I personally spent a total of 30 minutes which includes face-to-face time and non-face-to-face time spent on preparing to see the patient, reviewing hospital notes and tests, obtaining history from the patient, performing a medically appropriate exam, counseling and educating the patient, ordering medications/tests/procedures/referrals as clinically indicated, and documenting information in the electronic medical record.     Thank you for allowing me to participate in the care of this patient.      Please contact me with any questions.    ROGERIO Enamorado.   Cardiologist, [facility]  [phone]        "

## 2025-01-18 NOTE — ASSESSMENT & PLAN NOTE
Temporary pacing  I reviewed monitor summary that showed heart rate ranging between 39 to 80s.  Continue monitor closely in IMCU   Cardiology is following her.  Monitor electrolytes and replace as needed.

## 2025-01-18 NOTE — CONSULTS
Hospital Medicine Consultation    Date of Service  1/18/2025    Referring Physician  Brandon Contreras M.D.    Consulting Physician  Erick Cullen M.D.    Reason for Consultation  For management of acute bradycardia in [facility]    History of Presenting Illness    59 y.o. female with past medical history of ulcerative colitis who presented to the hospital on 1/16/2025 with lower abdominal pain.  She was diagnosed with acute appendicitis and she underwent laparoscopic appendectomy on January 16, 2025.  Patient developed bradycardia and she admitted to surgical ICU.  Due to her symptomatic and significant bradycardia cardiology was consulted and she underwent transvenous pacer on January 17, 2025.    On January 18, 2025 surgical team requested patient transferred to [facility] for ongoing bradycardia and requested hospitalist service consultation.    I ordered and examined her at the bedside.  She expressed that she is feeling better.  Her heart rate has been improving.  Continue pacer support and close monitoring in [facility].  I discussed plan of care with the and answered all her questions.    Review of Systems  Review of Systems   Constitutional:  Positive for malaise/fatigue. Negative for chills, fever and weight loss.   HENT:  Negative for hearing loss and tinnitus.    Eyes:  Negative for blurred vision, double vision, photophobia and pain.   Respiratory:  Negative for cough, sputum production and shortness of breath.    Cardiovascular:  Negative for chest pain, palpitations, orthopnea and leg swelling.   Gastrointestinal:  Negative for abdominal pain, constipation, diarrhea, nausea and vomiting.   Genitourinary:  Negative for dysuria, frequency and urgency.   Musculoskeletal:  Negative for back pain, joint pain, myalgias and neck pain.   Skin:  Negative for rash.   Neurological:  Negative for dizziness, tingling, tremors, sensory change, speech change, focal weakness and headaches.   Psychiatric/Behavioral:  Negative  for hallucinations and substance abuse.    All other systems reviewed and are negative.      Past Medical History   has a past medical history of Bleeding from the nose, Blood transfusion without reported diagnosis, Hay fever, Hives, Measles, and Mumps.    Surgical History   has a past surgical history that includes eye surgery and pr lap,appendectomy (N/A, 1/16/2025).    Family History  family history is not on file.    Social History   reports that she has never smoked. She has never used smokeless tobacco. She reports current alcohol use. She reports that she does not use drugs.    Medications  Prior to Admission Medications   Prescriptions Last Dose Informant Patient Reported? Taking?   Calcium Carbonate (CALCIUM 600 HIGH POTENCY PO) 1/13/2025 Noon Patient Yes Yes   Sig: Take 600 mg by mouth every day.   IRON PO 1/13/2025 Morning Patient Yes No   Sig: Take 1 Tab by mouth every day.   Mesalamine (APRISO) 0.375 GM CAPSULE SR 24 HR Unknown Patient Yes No   Sig: Take  by mouth 2 times a day. 1 cap BID  Per pt, weaning off   VITAMIN D PO 1/13/2025 Evening Patient Yes Yes   Sig: Take 1 Tablet by mouth every day.   acetaminophen (TYLENOL) 500 MG Tab 1/13/2025 Patient Yes Yes   Sig: Take 1,000 mg by mouth 3 times a day as needed for Mild Pain.   levothyroxine (SYNTHROID) 88 MCG Tab 1/13/2025 Morning Patient No No   Sig: TAKE ONE TABLET BY MOUTH EVERY MORNING ON AN EMPTY STOMACH (SYNTHROID)   Patient taking differently: Take 88 mcg by mouth every morning on an empty stomach.   oxybutynin (DITROPAN) 5 MG Tab  Patient No No   Sig: TAKE ONE TABLET BY MOUTH TWICE A DAY (DITROPAN)   vedolizumab (ENTYVIO) 300 MG Recon Soln injection 1/7/2025 Patient Yes Yes   Sig: Infuse 300 mg into a venous catheter every 4 weeks.      Facility-Administered Medications: None       Allergies  Allergies   Allergen Reactions    Sulfa Drugs Shortness of Breath    Sulfasalazine Shortness of Breath    Latex Unspecified     Bumps      Mesalamine  Shortness of Breath     Given new RX 7/2019, currently taking       Physical Exam  Pulse:  [39-82] 82  Resp:  [13-35] 20  BP: ()/(58-78) 108/69  SpO2:  [88 %-100 %] 88 %    Physical Exam  Vitals reviewed.   Constitutional:       General: She is not in acute distress.     Appearance: Normal appearance. She is not ill-appearing.   HENT:      Head: Normocephalic and atraumatic.      Nose: No congestion.   Eyes:      General:         Right eye: No discharge.         Left eye: No discharge.      Pupils: Pupils are equal, round, and reactive to light.   Cardiovascular:      Rate and Rhythm: Regular rhythm. Bradycardia present.      Pulses: Normal pulses.      Heart sounds: Normal heart sounds. No murmur heard.  Pulmonary:      Effort: Pulmonary effort is normal. No respiratory distress.      Breath sounds: Normal breath sounds. No stridor.   Abdominal:      General: Bowel sounds are normal. There is no distension.      Palpations: Abdomen is soft.      Tenderness: There is abdominal tenderness.      Comments: Recent surgical wound from laparoscopic appendicectomy.   Musculoskeletal:         General: No swelling or tenderness. Normal range of motion.      Cervical back: Normal range of motion. No rigidity.   Skin:     General: Skin is warm.      Capillary Refill: Capillary refill takes less than 2 seconds.      Coloration: Skin is not jaundiced or pale.      Findings: No bruising.   Neurological:      General: No focal deficit present.      Mental Status: She is alert and oriented to person, place, and time.      Cranial Nerves: No cranial nerve deficit.      Comments: She is following commands and moving her extremities   Psychiatric:         Mood and Affect: Mood normal.         Behavior: Behavior normal.         Fluids  Date 01/18/25 0700 - 01/19/25 0659   Shift 4348-8530 4934-8535 2984-5038 24 Hour Total   INTAKE   P.O. 275   275   IV Piggyback 89.2   89.2   Shift Total 364.2   364.2   OUTPUT   Urine 195   195    Shift Total 195   195   Weight (kg) 60.6 60.6 60.6 60.6       Laboratory  Recent Labs     01/16/25 2035 01/17/25 0315 01/18/25  0347   WBC 7.0 8.8 8.2   RBC 3.07* 3.64* 3.40*   HEMOGLOBIN 9.0* 10.4* 9.7*   HEMATOCRIT 28.6* 33.0* 31.2*   MCV 93.2 90.7 91.8   MCH 29.3 28.6 28.5   MCHC 31.5* 31.5* 31.1*   RDW 51.2* 49.9 51.1*   PLATELETCT 186 247 235   MPV 9.4 9.3 10.0     Recent Labs     01/17/25 0315 01/17/25  1042 01/18/25  0347   SODIUM 138 141 139   POTASSIUM 4.0 3.6 4.2   CHLORIDE 103 103 106   CO2 21 26 22   GLUCOSE 187* 159* 157*   BUN 13 13 18   CREATININE 0.53 0.58 0.47*   CALCIUM 8.9 8.5 8.5                     Imaging  EC-ECHOCARDIOGRAM COMPLETE W/O CONT   Final Result      DX-CHEST-FOR LINE PLACEMENT Perform procedure in: Patient's Room   Final Result         1.  Left basilar atelectasis, no focal infiltrate      US-ABDOMEN LTD (SOFT TISSUE)   Final Result      Trace perihepatic fluid. No other significant fluid or focal fluid collection is visualized.      CT-ABDOMEN-PELVIS WITH   Final Result         1.  Changes of acute appendicitis   2.  Fluid-filled dilated small bowel loops, appearance favoring component of ileus   3.  Trace of pleural effusion      These findings were discussed with the patient's clinician, Matt Lema, on 1/16/2025 5:58 AM.      CL-TEMPORARY PACEMAKER INSERT    (Results Pending)       Assessment/Plan  * Abdominal pain- (present on admission)  Assessment & Plan  Now abdominal pain has improved.  Continue antibiotic.      Hyperglycemia  Assessment & Plan  Patient went to hyperglycemia last HbA1c was 5.8  Ordered HbA1c for tomorrow.    Arrhythmia  Assessment & Plan  Temporary pacing  Continue monitor closely in IMCU   Discussed plan of care with surgical ICU team.      Hypotension- (present on admission)  Assessment & Plan  Postoperative hypotension refractory to 1.5 L fluid bolus.  Abdominal ultrasound with no significant free fluid.  Hemoglobin 10.3. Cortisol 10.9.    Now blood pressure has improved   A plan to transfer her to [facility] while she has transvenous pacer.  Continue to monitor closely.    Appendicitis, acute  Assessment & Plan  Patient went laparoscopic cholecystectomy.  Surgery is following her.  Continue IV Zosyn.      Thank you for allow me to participate in patient care.  Hospitalist service will continue to follow this patient.    I reviewed and summarized patient hospitalization in this document.    I discussed plan of care with bedside RN.    I discussed plan of care with surgery critical care team.    High complexity medical care due to multiorgan involvement.

## 2025-01-18 NOTE — PROGRESS NOTES
Assumed care of patient, garcia removed prior to transfer, pt urinated 25 ml using bedside commode, pt still educated to drink fluids, if patient does not urinate within 6 hours or has urgency will bladder scan.     Pt has TVP in place, pt not actively pacing SB-[id number]. Pt complains of no pain, call light in reach, all questions answered.

## 2025-01-18 NOTE — ASSESSMENT & PLAN NOTE
Patient went laparoscopic cholecystectomy.  Surgery is following her.  Continue IV Zosyn.  White blood cell count is within normal limits  General Surgery is following her.

## 2025-01-18 NOTE — PROGRESS NOTES
Urine output 10-15 cc/hr for four hours. Bladder scan showing 1cc in bladder.   Dr. Ontiveros notified, 1L NS bolus ordered.

## 2025-01-18 NOTE — ASSESSMENT & PLAN NOTE
"Subjective   Anastasiyabillie Kelly is a 55 y.o. female . 3 month f/u for vulvovaginal atrophy and chronic hematuria. DISCUSS  ANOTHER DEXA SCAN? Patient reports premarin is working for vaginal atrophy - getting ready to move to [place], [place] - Lakshmi is staying here in [place] at [facility] - Mercedez has decided to do the marines - Patient has lost 20 pounds and taking Vit D and calcium and walking -      History of Present Illness    The following portions of the patient's history were reviewed and updated as appropriate: allergies, current medications, past family history, past medical history, past social history, past surgical history and problem list.    Review of Systems   Constitutional: Negative for chills, fatigue and fever.   Gastrointestinal: Negative for abdominal distention and abdominal pain.   Genitourinary: Negative for dysuria, menstrual problem, pelvic pain, vaginal bleeding, vaginal discharge and vaginal pain.   All other systems reviewed and are negative.  /68  Ht 63\" (160 cm)  Wt 141 lb (64 kg)  LMP  (LMP Unknown)  Breastfeeding? No  BMI 24.98 kg/m2      Objective   Physical Exam   Constitutional: She is oriented to person, place, and time. She appears well-developed and well-nourished.   HENT:   Head: Normocephalic and atraumatic.   Pulmonary/Chest: Effort normal.   Neurological: She is alert and oriented to person, place, and time.   Skin: Skin is warm and dry.   Psychiatric: She has a normal mood and affect. Her behavior is normal.   Nursing note and vitals reviewed.      Assessment/Plan   Anastasiya was seen today for follow-up.    Diagnoses and all orders for this visit:    Vaginal atrophy    Screening  -     POC Urinalysis Dipstick    Osteopenia  -     DEXA Bone Density Axial; Future    Disorder of bone   -     DEXA Bone Density Axial; Future    Postmenopausal       Premarin samples given -   Counseling was given to patient for the following topics: osteoporosis prevention  . Total time of the " Now abdominal pain has improved.  Continue antibiotic.  Overall improving.  Tolerating diet.     encounter was 20 minutes and 15  minutes was spend counseling.

## 2025-01-18 NOTE — CARE PLAN
The patient is Watcher - Medium risk of patient condition declining or worsening    Shift Goals  Clinical Goals: Hemodynamic stability  Patient Goals: Rest  Family Goals: Rest    Progress made toward(s) clinical / shift goals:    Problem: Pain - Standard  Goal: Alleviation of pain or a reduction in pain to the patient’s comfort goal  Description: Target End Date:  Prior to discharge or change in level of care    Document on Vitals flowsheet    1.  Document pain using the appropriate pain scale per order or unit policy  2.  Educate and implement non-pharmacologic comfort measures (i.e. relaxation, distraction, massage, cold/heat therapy, etc.)  3.  Pain management medications as ordered  4.  Reassess pain after pain med administration per policy  5.  If opiods administered assess patient's response to pain medication is appropriate per POSS sedation scale  6.  Follow pain management plan developed in collaboration with patient and interdisciplinary team (including palliative care or pain specialists if applicable)  Outcome: Progressing     Problem: Knowledge Deficit - Standard  Goal: Patient and family/care givers will demonstrate understanding of plan of care, disease process/condition, diagnostic tests and medications  Description: Target End Date:  1-3 days or as soon as patient condition allows    Document in Patient Education    1.  Patient and family/caregiver oriented to unit, equipment, visitation policy and means for communicating concern  2.  Complete/review Learning Assessment  3.  Assess knowledge level of disease process/condition, treatment plan, diagnostic tests and medications  4.  Explain disease process/condition, treatment plan, diagnostic tests and medications  Outcome: Progressing     Problem: Fall Risk  Goal: Patient will remain free from falls  Description: Target End Date:  Prior to discharge or change in level of care    Document interventions on the Mai Montoya Fall Risk Assessment    1.   Assess for fall risk factors  2.  Implement fall precautions  Outcome: Progressing       Patient is not progressing towards the following goals: N/A

## 2025-01-18 NOTE — ASSESSMENT & PLAN NOTE
Postoperative hypotension refractory to 1.5 L fluid bolus.  Abdominal ultrasound with no significant free fluid.  Hemoglobin 10.3. Cortisol 10.9.   Now blood pressure has improved   Currently transvenous pacer in place cardiology is following therapy  TSH is low and free T4 within normal limits.  Now blood pressure has improved.  Current systolic pressure is 128 mmHg.  Echocardiogram did not show any acute abnormalities

## 2025-01-19 PROBLEM — R00.1 BRADYCARDIA: Status: ACTIVE | Noted: 2025-01-19

## 2025-01-19 LAB
ANION GAP SERPL CALC-SCNC: 8 MMOL/L (ref 7–16)
BASOPHILS # BLD AUTO: 0.1 % (ref 0–1.8)
BASOPHILS # BLD: 0.01 K/UL (ref 0–0.12)
BUN SERPL-MCNC: 24 MG/DL (ref 8–22)
CALCIUM SERPL-MCNC: 8.2 MG/DL (ref 8.5–10.5)
CHLORIDE SERPL-SCNC: 106 MMOL/L (ref 96–112)
CO2 SERPL-SCNC: 24 MMOL/L (ref 20–33)
CREAT SERPL-MCNC: 0.63 MG/DL (ref 0.5–1.4)
EOSINOPHIL # BLD AUTO: 0 K/UL (ref 0–0.51)
EOSINOPHIL NFR BLD: 0 % (ref 0–6.9)
ERYTHROCYTE [DISTWIDTH] IN BLOOD BY AUTOMATED COUNT: 51.3 FL (ref 35.9–50)
EST. AVERAGE GLUCOSE BLD GHB EST-MCNC: 128 MG/DL
GFR SERPLBLD CREATININE-BSD FMLA CKD-EPI: 101 ML/MIN/1.73 M 2
GLUCOSE SERPL-MCNC: 97 MG/DL (ref 65–99)
HBA1C MFR BLD: 6.1 % (ref 4–5.6)
HCT VFR BLD AUTO: 30.5 % (ref 37–47)
HGB BLD-MCNC: 9.3 G/DL (ref 12–16)
IMM GRANULOCYTES # BLD AUTO: 0.02 K/UL (ref 0–0.11)
IMM GRANULOCYTES NFR BLD AUTO: 0.2 % (ref 0–0.9)
LYMPHOCYTES # BLD AUTO: 2.66 K/UL (ref 1–4.8)
LYMPHOCYTES NFR BLD: 31.1 % (ref 22–41)
MCH RBC QN AUTO: 28 PG (ref 27–33)
MCHC RBC AUTO-ENTMCNC: 30.5 G/DL (ref 32.2–35.5)
MCV RBC AUTO: 91.9 FL (ref 81.4–97.8)
MONOCYTES # BLD AUTO: 0.36 K/UL (ref 0–0.85)
MONOCYTES NFR BLD AUTO: 4.2 % (ref 0–13.4)
NEUTROPHILS # BLD AUTO: 5.5 K/UL (ref 1.82–7.42)
NEUTROPHILS NFR BLD: 64.4 % (ref 44–72)
NRBC # BLD AUTO: 0 K/UL
NRBC BLD-RTO: 0 /100 WBC (ref 0–0.2)
PLATELET # BLD AUTO: 277 K/UL (ref 164–446)
PMV BLD AUTO: 9.4 FL (ref 9–12.9)
POTASSIUM SERPL-SCNC: 3.9 MMOL/L (ref 3.6–5.5)
RBC # BLD AUTO: 3.32 M/UL (ref 4.2–5.4)
SODIUM SERPL-SCNC: 138 MMOL/L (ref 135–145)
WBC # BLD AUTO: 8.6 K/UL (ref 4.8–10.8)

## 2025-01-19 PROCEDURE — 770000 HCHG ROOM/CARE - INTERMEDIATE ICU *

## 2025-01-19 PROCEDURE — A9270 NON-COVERED ITEM OR SERVICE: HCPCS | Performed by: INTERNAL MEDICINE

## 2025-01-19 PROCEDURE — 99024 POSTOP FOLLOW-UP VISIT: CPT | Performed by: NURSE PRACTITIONER

## 2025-01-19 PROCEDURE — 99232 SBSQ HOSP IP/OBS MODERATE 35: CPT | Mod: FS | Performed by: INTERNAL MEDICINE

## 2025-01-19 PROCEDURE — 700111 HCHG RX REV CODE 636 W/ 250 OVERRIDE (IP): Mod: JZ | Performed by: NURSE PRACTITIONER

## 2025-01-19 PROCEDURE — 700105 HCHG RX REV CODE 258: Performed by: STUDENT IN AN ORGANIZED HEALTH CARE EDUCATION/TRAINING PROGRAM

## 2025-01-19 PROCEDURE — 700102 HCHG RX REV CODE 250 W/ 637 OVERRIDE(OP): Performed by: STUDENT IN AN ORGANIZED HEALTH CARE EDUCATION/TRAINING PROGRAM

## 2025-01-19 PROCEDURE — 700111 HCHG RX REV CODE 636 W/ 250 OVERRIDE (IP): Mod: JZ | Performed by: STUDENT IN AN ORGANIZED HEALTH CARE EDUCATION/TRAINING PROGRAM

## 2025-01-19 PROCEDURE — 99232 SBSQ HOSP IP/OBS MODERATE 35: CPT | Performed by: INTERNAL MEDICINE

## 2025-01-19 PROCEDURE — 80048 BASIC METABOLIC PNL TOTAL CA: CPT

## 2025-01-19 PROCEDURE — 700102 HCHG RX REV CODE 250 W/ 637 OVERRIDE(OP): Performed by: INTERNAL MEDICINE

## 2025-01-19 PROCEDURE — 83036 HEMOGLOBIN GLYCOSYLATED A1C: CPT

## 2025-01-19 PROCEDURE — 85025 COMPLETE CBC W/AUTO DIFF WBC: CPT

## 2025-01-19 PROCEDURE — A9270 NON-COVERED ITEM OR SERVICE: HCPCS | Performed by: STUDENT IN AN ORGANIZED HEALTH CARE EDUCATION/TRAINING PROGRAM

## 2025-01-19 RX ORDER — LEVOTHYROXINE SODIUM 88 UG/1
88 TABLET ORAL
Status: DISCONTINUED | OUTPATIENT
Start: 2025-01-19 | End: 2025-01-21 | Stop reason: HOSPADM

## 2025-01-19 RX ADMIN — PIPERACILLIN AND TAZOBACTAM 4.5 G: 4; .5 INJECTION, POWDER, FOR SOLUTION INTRAVENOUS at 12:49

## 2025-01-19 RX ADMIN — OXYCODONE 2.5 MG: 5 TABLET ORAL at 08:20

## 2025-01-19 RX ADMIN — PIPERACILLIN AND TAZOBACTAM 4.5 G: 4; .5 INJECTION, POWDER, FOR SOLUTION INTRAVENOUS at 06:15

## 2025-01-19 RX ADMIN — ENOXAPARIN SODIUM 40 MG: 100 INJECTION SUBCUTANEOUS at 18:02

## 2025-01-19 RX ADMIN — OXYCODONE 5 MG: 5 TABLET ORAL at 11:21

## 2025-01-19 RX ADMIN — LEVOTHYROXINE SODIUM 88 MCG: 0.09 TABLET ORAL at 12:49

## 2025-01-19 RX ADMIN — PIPERACILLIN AND TAZOBACTAM 4.5 G: 4; .5 INJECTION, POWDER, FOR SOLUTION INTRAVENOUS at 21:22

## 2025-01-19 RX ADMIN — ACETAMINOPHEN 1000 MG: 500 TABLET ORAL at 00:38

## 2025-01-19 RX ADMIN — ACETAMINOPHEN 1000 MG: 500 TABLET ORAL at 18:01

## 2025-01-19 RX ADMIN — ACETAMINOPHEN 1000 MG: 500 TABLET ORAL at 12:51

## 2025-01-19 ASSESSMENT — ENCOUNTER SYMPTOMS
BLURRED VISION: 0
WEIGHT LOSS: 0
COUGH: 0
TINGLING: 0
SENSORY CHANGE: 0
NECK PAIN: 0
PALPITATIONS: 0
VOMITING: 0
FOCAL WEAKNESS: 0
NAUSEA: 0
DIZZINESS: 0
DIARRHEA: 0
WHEEZING: 0
PHOTOPHOBIA: 0
SPEECH CHANGE: 0
FEVER: 0
MYALGIAS: 0
CHILLS: 0
CONSTIPATION: 0
ORTHOPNEA: 0
SHORTNESS OF BREATH: 0
DOUBLE VISION: 0
SPUTUM PRODUCTION: 0
BACK PAIN: 0
EYE PAIN: 0
TREMORS: 0
STRIDOR: 0
HALLUCINATIONS: 0
HEADACHES: 0
ABDOMINAL PAIN: 1

## 2025-01-19 ASSESSMENT — PAIN DESCRIPTION - PAIN TYPE
TYPE: ACUTE PAIN
TYPE: SURGICAL PAIN
TYPE: ACUTE PAIN
TYPE: SURGICAL PAIN
TYPE: ACUTE PAIN
TYPE: SURGICAL PAIN
TYPE: ACUTE PAIN
TYPE: ACUTE PAIN

## 2025-01-19 ASSESSMENT — FIBROSIS 4 INDEX: FIB4 SCORE: 1.8

## 2025-01-19 ASSESSMENT — LIFESTYLE VARIABLES: SUBSTANCE_ABUSE: 0

## 2025-01-19 NOTE — CARE PLAN
The patient is Watcher - Medium risk of patient condition declining or worsening    Shift Goals  Clinical Goals: Wean transvenous pacer, minimal pacer spikes, hemodynamic stability  Patient Goals: comfort, sleep  Family Goals: Updates    Progress made toward(s) clinical / shift goals:      Problem: Pain - Standard  Goal: Alleviation of pain or a reduction in pain to the patient’s comfort goal  Outcome: Progressing     Problem: Knowledge Deficit - Standard  Goal: Patient and family/care givers will demonstrate understanding of plan of care, disease process/condition, diagnostic tests and medications  Outcome: Progressing     Problem: Fall Risk  Goal: Patient will remain free from falls  Outcome: Progressing

## 2025-01-19 NOTE — ASSESSMENT & PLAN NOTE
Patient has significant bradycardia with heart rate in 40s.  Continue transvenous pacing.  Cardiology is following her.  I reviewed cardiology note.  Plan is to consult for EP tomorrow.  Cardiology service is following her.  Continue to monitor closely in IMCU on telemetry.

## 2025-01-19 NOTE — CARE PLAN
The patient is Watcher - Medium risk of patient condition declining or worsening    Shift Goals  Clinical Goals: Wean transvenous pacer, transfer to [facility] if able, oxygen stability  Patient Goals: Comfort  Family Goals: Updates    Progress made toward(s) clinical / shift goals:    Problem: Pain - Standard  Goal: Alleviation of pain or a reduction in pain to the patient’s comfort goal  Description: Target End Date:  Prior to discharge or change in level of care    Document on Vitals flowsheet    1.  Document pain using the appropriate pain scale per order or unit policy  2.  Educate and implement non-pharmacologic comfort measures (i.e. relaxation, distraction, massage, cold/heat therapy, etc.)  3.  Pain management medications as ordered  4.  Reassess pain after pain med administration per policy  5.  If opiods administered assess patient's response to pain medication is appropriate per POSS sedation scale  6.  Follow pain management plan developed in collaboration with patient and interdisciplinary team (including palliative care or pain specialists if applicable)  Outcome: Progressing     Problem: Knowledge Deficit - Standard  Goal: Patient and family/care givers will demonstrate understanding of plan of care, disease process/condition, diagnostic tests and medications  Description: Target End Date:  1-3 days or as soon as patient condition allows    Document in Patient Education    1.  Patient and family/caregiver oriented to unit, equipment, visitation policy and means for communicating concern  2.  Complete/review Learning Assessment  3.  Assess knowledge level of disease process/condition, treatment plan, diagnostic tests and medications  4.  Explain disease process/condition, treatment plan, diagnostic tests and medications  Outcome: Progressing       Patient is not progressing towards the following goals:

## 2025-01-19 NOTE — PROGRESS NOTES
Bladder scan patient total 125ml, pt has no urgency, and bladder not distended. Pt encourage to drink more fluids.

## 2025-01-19 NOTE — PROGRESS NOTES
Cardiology Follow Up Progress Note    Date of Service  1/19/25    Attending Physician  Brandon Contreras M.D.    Chief Complaint     Bradycardia& Hypotension post anesthesia after lap appendectomy.  Status post transvenous pacing.    HPI  Mira Ramirez is a 59 y.o. female admitted 1/16/2025 with abdominal pain, nausea/anorexia x 4 days.  Found acute appendicitis underwent laparoscopy appendectomy 1/16/2025.    PMH: Ulcerative colitis, chronic anemia, hypothyroid      Interim Events    No overnight cardiac events  Telemetry-HR 40's  S/p  transvenous pacing 1/17/25.    Will discuss with EP on Monday.  Suspect vagal episode post abdominal surgery and likely no need for pacemaker.  Review of Systems  Review of Systems   Respiratory:  Negative for cough, shortness of breath, wheezing and stridor.        Vital signs in last 24 hours  Temp:  [36 °C (96.8 °F)-36.6 °C (97.8 °F)] 36.2 °C (97.1 °F)  Pulse:  [39-82] 47  Resp:  [15-41] 22  BP: ()/(55-94) 133/94  SpO2:  [88 %-98 %] 94 %    Physical Exam  Physical Exam  Cardiovascular:      Rate and Rhythm: Bradycardia present.      Pulses: Normal pulses.      Comments: HR 40-50  Pulmonary:      Effort: Pulmonary effort is normal.   Skin:     General: Skin is warm.   Neurological:      Mental Status: She is alert. Mental status is at baseline.   Psychiatric:         Mood and Affect: Mood normal.         Lab Review  Lab Results   Component Value Date/Time    WBC 8.6 01/19/2025 03:41 AM    RBC 3.32 (L) 01/19/2025 03:41 AM    HEMOGLOBIN 9.3 (L) 01/19/2025 03:41 AM    HEMATOCRIT 30.5 (L) 01/19/2025 03:41 AM    MCV 91.9 01/19/2025 03:41 AM    MCH 28.0 01/19/2025 03:41 AM    MCHC 30.5 (L) 01/19/2025 03:41 AM    MPV 9.4 01/19/2025 03:41 AM      Lab Results   Component Value Date/Time    SODIUM 138 01/19/2025 03:41 AM    POTASSIUM 3.9 01/19/2025 03:41 AM    CHLORIDE 106 01/19/2025 03:41 AM    CO2 24 01/19/2025 03:41 AM    GLUCOSE 97 01/19/2025 03:41 AM    BUN 24 (H)  "01/19/2025 03:41 AM    CREATININE 0.63 01/19/2025 03:41 AM    GLOMRATE 74 04/10/2023 05:22 PM      Lab Results   Component Value Date/Time    ASTSGOT 24 01/16/2025 04:26 AM    ALTSGPT 12 01/16/2025 04:26 AM     Lab Results   Component Value Date/Time    CHOLSTRLTOT 178 09/26/2024 02:31 PM    LDL 91 09/26/2024 02:31 PM    HDL 44 09/26/2024 02:31 PM    TRIGLYCERIDE 217 (H) 09/26/2024 02:31 PM    TROPONINT 8 01/17/2025 01:03 AM       No results for input(s): \"NTPROBNP\" in the last 72 hours.    Cardiac Imaging and Procedures Review  EKG:  high degree AVB      Echocardiogram:    CONCLUSIONS  No prior study is available for comparison.   Normal left ventricular systolic function.   No evidence of valvular abnormality based on Doppler evaluation.           Imaging  Chest X-Ray:     Left basilar atelectasis, no focal infiltrate            Assessment/Plan    Septic shock  -S/p lap appendectomy 1/16/2025  -suppurative appendicitis.  -Hemodynamic instability with hypotension & bradycardia post anesthesia for lap appendectomy.  -S/P transvenous pacing 1/17/2025.  -Heart rate 40-intermittent pacing.  -EP consult on Monday.  -Most likely vagal episode post abdominal surgery and no need for pacemaker.  -Echo reassuring.    Cardiology will follow along.      I personally spent a total of 30 minutes which includes face-to-face time and non-face-to-face time spent on preparing to see the patient, reviewing hospital notes and tests, obtaining history from the patient, performing a medically appropriate exam, counseling and educating the patient, ordering medications/tests/procedures/referrals as clinically indicated, and documenting information in the electronic medical record.     Thank you for allowing me to participate in the care of this patient.      Please contact me with any questions.    ROGERIO Enamorado.   Cardiologist, [facility]  [phone]        "

## 2025-01-19 NOTE — PROGRESS NOTES
"  DATE: 1/19/2025 1/16 Laparoscopic appendectomy.  Superlative appendicitis with no perforation.  1/17 Transvenous pacer placed secondary to symptomatic bradycardia.    INTERVAL EVENTS:    Normal white blood cell count on IV antibiotic therapy.    Transvenous pacer per cardiology.    REVIEW OF SYSTEMS:  Comprehensive review of systems is negative with the exception of the aforementioned HPI, PMH, and PSH bullets in accordance with CMS guidelines.    PHYSICAL EXAMINATION:  Vital Signs: BP 98/66   Pulse 62   Temp 36 °C (96.8 °F) (Temporal)   Resp 15   Ht 1.549 m (5' 0.98\")   Wt 64.2 kg (141 lb 8.6 oz)   SpO2 96%   Physical Exam  Vitals and nursing note reviewed.   Constitutional:       General: She is not in acute distress.     Appearance: She is not ill-appearing, toxic-appearing or diaphoretic.   HENT:      Head: Normocephalic.   Cardiovascular:      Rate and Rhythm: Bradycardia present.      Comments: Pacer  Abdominal:      General: There is no distension.      Tenderness: There is abdominal tenderness. There is no guarding or rebound.      Comments: Appropriate postoperative tenderness.  Port sites well-approximated with no overt signs and symptoms of infection.   Musculoskeletal:         General: No tenderness.   Skin:     General: Skin is warm and dry.      Capillary Refill: Capillary refill takes less than 2 seconds.   Neurological:      General: No focal deficit present.      Mental Status: She is alert.         LABORATORY VALUES:  Recent Labs     01/17/25  0315 01/18/25  0347 01/19/25  0341   WBC 8.8 8.2 8.6   RBC 3.64* 3.40* 3.32*   HEMOGLOBIN 10.4* 9.7* 9.3*   HEMATOCRIT 33.0* 31.2* 30.5*   MCV 90.7 91.8 91.9   MCH 28.6 28.5 28.0   MCHC 31.5* 31.1* 30.5*   RDW 49.9 51.1* 51.3*   PLATELETCT 247 235 277   MPV 9.3 10.0 9.4     Recent Labs     01/17/25  1042 01/18/25  0347 01/19/25  0341   SODIUM 141 139 138   POTASSIUM 3.6 4.2 3.9   CHLORIDE 103 106 106   CO2 26 22 24   GLUCOSE 159* 157* 97   BUN 13 " 18 24*   CREATININE 0.58 0.47* 0.63   CALCIUM 8.5 8.5 8.2*               IMAGING:  EC-ECHOCARDIOGRAM COMPLETE W/O CONT   Final Result      DX-CHEST-FOR LINE PLACEMENT Perform procedure in: Patient's Room   Final Result         1.  Left basilar atelectasis, no focal infiltrate      US-ABDOMEN LTD (SOFT TISSUE)   Final Result      Trace perihepatic fluid. No other significant fluid or focal fluid collection is visualized.      CT-ABDOMEN-PELVIS WITH   Final Result         1.  Changes of acute appendicitis   2.  Fluid-filled dilated small bowel loops, appearance favoring component of ileus   3.  Trace of pleural effusion      These findings were discussed with the patient's clinician, Matt Lema, on 1/16/2025 5:58 AM.      CL-TEMPORARY PACEMAKER INSERT    (Results Pending)       ASSESSMENT AND PLAN:  Arrhythmia  Assessment & Plan  1/17 Temporary transvenous pacemaker placed for symptomatic bradycardia.  1/19 Paced rhythm.   Dmitry Eduardo M.D., Interventional Radiology.       Hypotension- (present on admission)  Assessment & Plan  Postoperative hypotension refractory to 1.5 L fluid bolus.  Abdominal ultrasound with no significant free fluid.  Hemoglobin 10.3. Cortisol 10.9.     Appendicitis, acute  Assessment & Plan  1/16 Laparoscopic appendectomy.  Superlative appendicitis with no perforation.  1/19 PO day 3, continue Zosyn.  1/20 4 day course of Zosyn to complete.  Transition to 7 day course of Augmentin status post Zosyn.         ____________________________________     ROGERIO Gong.    DD: 1/19/2025  6:42 AM

## 2025-01-19 NOTE — PROGRESS NOTES
Hospital Medicine Daily Progress Note    Date of Service  1/19/2025    Chief Complaint  Mira Ramirez is a 59 y.o. female admitted 1/16/2025 with abdominal pain    Hospital Course    59 y.o. female with past medical history of ulcerative colitis who presented to the hospital on 1/16/2025 with lower abdominal pain.  She was diagnosed with acute appendicitis and she underwent laparoscopic appendectomy on January 16, 2025.  Patient developed bradycardia and she admitted to surgical ICU.  Due to her symptomatic and significant bradycardia cardiology was consulted and she underwent transvenous pacer on January 17, 2025.     On January 18, 2025 surgical team requested patient transferred to [facility] for ongoing bradycardia and requested hospitalist service consultation.       Interval Problem Update    01/19/25    I evaluated and examined her at the bedside.  She reported abdominal pain.  I reviewed monitor summary that showed heart rate ranging between 39 to 80s.  CBC showed white blood cell count of 8.6, hemoglobin of 9.3 and platelet count of 277  BMP showed sodium of 138, potassium of 3.9  She found to have HbA1c of 6.1  I discussed plan of care with him and answered all questions.    I have discussed this patient's plan of care and discharge plan at IDT rounds today with Case Management, Nursing, Nursing leadership, and other members of the IDT team.    Consultants/Specialty  cardiology and general surgery    Code Status  Full Code    Disposition  The patient is not medically cleared for discharge to home or a post-acute facility.      I have placed the appropriate orders for post-discharge needs.    Review of Systems  Review of Systems   Constitutional:  Negative for chills, fever and weight loss.   HENT:  Negative for hearing loss and tinnitus.    Eyes:  Negative for blurred vision, double vision, photophobia and pain.   Respiratory:  Negative for cough, sputum production and shortness of breath.     Cardiovascular:  Negative for chest pain, palpitations, orthopnea and leg swelling.   Gastrointestinal:  Positive for abdominal pain. Negative for constipation, diarrhea, nausea and vomiting.   Genitourinary:  Negative for dysuria, frequency and urgency.   Musculoskeletal:  Negative for back pain, joint pain, myalgias and neck pain.   Skin:  Negative for rash.   Neurological:  Negative for dizziness, tingling, tremors, sensory change, speech change, focal weakness and headaches.   Psychiatric/Behavioral:  Negative for hallucinations and substance abuse.    All other systems reviewed and are negative.       Physical Exam  Temp:  [36 °C (96.8 °F)-36.6 °C (97.8 °F)] 36 °C (96.8 °F)  Pulse:  [39-82] 62  Resp:  [15-41] 15  BP: ()/(55-76) 98/66  SpO2:  [88 %-98 %] 96 %    Physical Exam  Vitals reviewed.   Constitutional:       General: She is not in acute distress.     Appearance: Normal appearance. She is ill-appearing.   HENT:      Head: Normocephalic and atraumatic.      Nose: No congestion.   Eyes:      General:         Right eye: No discharge.         Left eye: No discharge.      Pupils: Pupils are equal, round, and reactive to light.   Cardiovascular:      Rate and Rhythm: Regular rhythm. Bradycardia present.      Pulses: Normal pulses.      Heart sounds: Normal heart sounds. No murmur heard.  Pulmonary:      Effort: Pulmonary effort is normal. No respiratory distress.      Breath sounds: Normal breath sounds. No stridor.   Abdominal:      General: Bowel sounds are normal. There is no distension.      Palpations: Abdomen is soft.      Tenderness: There is abdominal tenderness.   Musculoskeletal:         General: No swelling or tenderness. Normal range of motion.      Cervical back: Normal range of motion. No rigidity.   Skin:     General: Skin is warm.      Capillary Refill: Capillary refill takes less than 2 seconds.      Coloration: Skin is not jaundiced or pale.      Findings: No bruising.   Neurological:       General: No focal deficit present.      Mental Status: She is alert and oriented to person, place, and time.      Cranial Nerves: No cranial nerve deficit.   Psychiatric:         Mood and Affect: Mood normal.         Behavior: Behavior normal.         Fluids    Intake/Output Summary (Last 24 hours) at 1/19/2025 0826  Last data filed at 1/18/2025 2100  Gross per 24 hour   Intake 479.15 ml   Output 161 ml   Net 318.15 ml        Laboratory  Recent Labs     01/17/25  0315 01/18/25  0347 01/19/25  0341   WBC 8.8 8.2 8.6   RBC 3.64* 3.40* 3.32*   HEMOGLOBIN 10.4* 9.7* 9.3*   HEMATOCRIT 33.0* 31.2* 30.5*   MCV 90.7 91.8 91.9   MCH 28.6 28.5 28.0   MCHC 31.5* 31.1* 30.5*   RDW 49.9 51.1* 51.3*   PLATELETCT 247 235 277   MPV 9.3 10.0 9.4     Recent Labs     01/17/25  1042 01/18/25  0347 01/19/25  0341   SODIUM 141 139 138   POTASSIUM 3.6 4.2 3.9   CHLORIDE 103 106 106   CO2 26 22 24   GLUCOSE 159* 157* 97   BUN 13 18 24*   CREATININE 0.58 0.47* 0.63   CALCIUM 8.5 8.5 8.2*                   Imaging  EC-ECHOCARDIOGRAM COMPLETE W/O CONT   Final Result      DX-CHEST-FOR LINE PLACEMENT Perform procedure in: Patient's Room   Final Result         1.  Left basilar atelectasis, no focal infiltrate      US-ABDOMEN LTD (SOFT TISSUE)   Final Result      Trace perihepatic fluid. No other significant fluid or focal fluid collection is visualized.      CT-ABDOMEN-PELVIS WITH   Final Result         1.  Changes of acute appendicitis   2.  Fluid-filled dilated small bowel loops, appearance favoring component of ileus   3.  Trace of pleural effusion      These findings were discussed with the patient's clinician, Matt Lema, on 1/16/2025 5:58 AM.      CL-TEMPORARY PACEMAKER INSERT    (Results Pending)        Assessment/Plan  * Abdominal pain- (present on admission)  Assessment & Plan  Now abdominal pain has improved.  Continue antibiotic.  Overall improving.      Bradycardia  Assessment & Plan  Patient has significant  bradycardia with heart rate in 40s.  Continue transvenous pacing.  Cardiology is following her.  I reviewed cardiology note.  Plan is to consult for EP tomorrow.  Continue to monitor closely in IMCU due to significant bradycardia.    Hyperglycemia  Assessment & Plan  Patient went to hyperglycemia last HbA1c was 5.8  Current HbA1c 6.1.    Arrhythmia  Assessment & Plan  Temporary pacing  I reviewed monitor summary that showed heart rate ranging between 39 to 80s.  Continue monitor closely in IMCU   Cardiology is following her.    Hypotension- (present on admission)  Assessment & Plan  Postoperative hypotension refractory to 1.5 L fluid bolus.  Abdominal ultrasound with no significant free fluid.  Hemoglobin 10.3. Cortisol 10.9.   Now blood pressure has improved   Currently transvenous pacer in place cardiology is following therapy  TSH is low and free T4 within normal limits.  Now blood pressure has improved.    Appendicitis, acute- (present on admission)  Assessment & Plan  Patient went laparoscopic cholecystectomy.  Surgery is following her.  Continue IV Zosyn.  White blood cell count is within normal limits         I discussed plan of care during multidisciplinary rounds regarding patient's current medical condition and plan of care.      VTE prophylaxis:    enoxaparin ppx      I have performed a physical exam and reviewed and updated ROS and Plan today (1/19/2025). In review of yesterday's note (1/18/2025), there are no changes except as documented above.

## 2025-01-19 NOTE — PROGRESS NOTES
4 Eyes Skin Assessment Completed by Carmenza RN and Figueroa RN.    Head WDL  Ears WDL  Nose WDL  Mouth WDL  Neck WDL  Breast/Chest WDL  Shoulder Blades WDL  Spine WDL  (R) Arm/Elbow/Hand WDL  (L) Arm/Elbow/Hand WDL  Abdomen Incision 3 xlap incision clean dry and intact   Groin WDL  Scrotum/Coccyx/Buttocks WDL  (R) Leg WDL  (L) Leg WDL  (R) Heel/Foot/Toe WDL  (L) Heel/Foot/Toe WDL          Devices In Places Tele Box, Blood Pressure Cuff, and Pulse Ox      Interventions In Place Gray Ear Foams, Pillows, and Low Air Loss Mattress    Possible Skin Injury No    Pictures Uploaded Into Epic N/A  Wound Consult Placed N/A  RN Wound Prevention Protocol Ordered No

## 2025-01-19 NOTE — WOUND TEAM
[facility]  Inpatient Services  Wound and Skin Care Brief Evaluation    Admission Date: 1/16/2025     Last order of IP CONSULT TO WOUND CARE was found on 1/16/2025 from Hospital Encounter on 1/16/2025     HPI, PMH, SH: Reviewed    Chief Complaint   Patient presents with    Abdominal Pain     Lower abd pain 10/10 for two days.     Diarrhea     For 3 days      Diagnosis: Abdominal pain [R10.9]    Unit where seen by Wound Team: [id number]     Wound consult placed regarding Right heel. Chart and images reviewed. This discussed with bedside RN. This clinician in to assess patient. Patient pleasant and agreeable. Patient reports having pain to right heel, states she thinks she was pressing down with her heels when she was in discomfort, and intermittently uses the pillows to offload heels. Heel offloading foams applied.      No redness pressure injuries or advanced wound care needs identified. Wound consult completed. No further follow up unless indicated and consulted.          PREVENTATIVE INTERVENTIONS:    Q shift Vern - performed per nursing policy  Q shift pressure point assessments - performed per nursing policy    Surface/Positioning  ICU Low Airloss - Currently in Place  TAPs Turning system - Currently in Place    Offloading/Redistribution  Heel offloading dressing (Silicone dressing) - Applied this Visit

## 2025-01-20 PROBLEM — E83.39 HYPOPHOSPHATEMIA: Status: ACTIVE | Noted: 2025-01-20

## 2025-01-20 LAB
ANION GAP SERPL CALC-SCNC: 10 MMOL/L (ref 7–16)
BASOPHILS # BLD AUTO: 0.1 % (ref 0–1.8)
BASOPHILS # BLD: 0.01 K/UL (ref 0–0.12)
BUN SERPL-MCNC: 13 MG/DL (ref 8–22)
CALCIUM SERPL-MCNC: 8.4 MG/DL (ref 8.5–10.5)
CHLORIDE SERPL-SCNC: 105 MMOL/L (ref 96–112)
CO2 SERPL-SCNC: 23 MMOL/L (ref 20–33)
CREAT SERPL-MCNC: 0.59 MG/DL (ref 0.5–1.4)
EOSINOPHIL # BLD AUTO: 0.02 K/UL (ref 0–0.51)
EOSINOPHIL NFR BLD: 0.3 % (ref 0–6.9)
ERYTHROCYTE [DISTWIDTH] IN BLOOD BY AUTOMATED COUNT: 48.4 FL (ref 35.9–50)
GFR SERPLBLD CREATININE-BSD FMLA CKD-EPI: 102 ML/MIN/1.73 M 2
GLUCOSE SERPL-MCNC: 97 MG/DL (ref 65–99)
HCT VFR BLD AUTO: 32.3 % (ref 37–47)
HGB BLD-MCNC: 10.2 G/DL (ref 12–16)
IMM GRANULOCYTES # BLD AUTO: 0.03 K/UL (ref 0–0.11)
IMM GRANULOCYTES NFR BLD AUTO: 0.4 % (ref 0–0.9)
LYMPHOCYTES # BLD AUTO: 2.44 K/UL (ref 1–4.8)
LYMPHOCYTES NFR BLD: 31.1 % (ref 22–41)
MAGNESIUM SERPL-MCNC: 2 MG/DL (ref 1.5–2.5)
MCH RBC QN AUTO: 28.6 PG (ref 27–33)
MCHC RBC AUTO-ENTMCNC: 31.6 G/DL (ref 32.2–35.5)
MCV RBC AUTO: 90.5 FL (ref 81.4–97.8)
MONOCYTES # BLD AUTO: 0.37 K/UL (ref 0–0.85)
MONOCYTES NFR BLD AUTO: 4.7 % (ref 0–13.4)
NEUTROPHILS # BLD AUTO: 4.97 K/UL (ref 1.82–7.42)
NEUTROPHILS NFR BLD: 63.4 % (ref 44–72)
NRBC # BLD AUTO: 0 K/UL
NRBC BLD-RTO: 0 /100 WBC (ref 0–0.2)
PHOSPHATE SERPL-MCNC: 2 MG/DL (ref 2.5–4.5)
PLATELET # BLD AUTO: 304 K/UL (ref 164–446)
PMV BLD AUTO: 9 FL (ref 9–12.9)
POTASSIUM SERPL-SCNC: 3.6 MMOL/L (ref 3.6–5.5)
RBC # BLD AUTO: 3.57 M/UL (ref 4.2–5.4)
SODIUM SERPL-SCNC: 138 MMOL/L (ref 135–145)
WBC # BLD AUTO: 7.8 K/UL (ref 4.8–10.8)

## 2025-01-20 PROCEDURE — A9270 NON-COVERED ITEM OR SERVICE: HCPCS | Performed by: INTERNAL MEDICINE

## 2025-01-20 PROCEDURE — 80048 BASIC METABOLIC PNL TOTAL CA: CPT

## 2025-01-20 PROCEDURE — 700111 HCHG RX REV CODE 636 W/ 250 OVERRIDE (IP): Mod: JZ | Performed by: NURSE PRACTITIONER

## 2025-01-20 PROCEDURE — 84100 ASSAY OF PHOSPHORUS: CPT

## 2025-01-20 PROCEDURE — 99222 1ST HOSP IP/OBS MODERATE 55: CPT | Performed by: INTERNAL MEDICINE

## 2025-01-20 PROCEDURE — 700102 HCHG RX REV CODE 250 W/ 637 OVERRIDE(OP): Performed by: STUDENT IN AN ORGANIZED HEALTH CARE EDUCATION/TRAINING PROGRAM

## 2025-01-20 PROCEDURE — A9270 NON-COVERED ITEM OR SERVICE: HCPCS | Performed by: STUDENT IN AN ORGANIZED HEALTH CARE EDUCATION/TRAINING PROGRAM

## 2025-01-20 PROCEDURE — 700111 HCHG RX REV CODE 636 W/ 250 OVERRIDE (IP): Mod: JZ | Performed by: STUDENT IN AN ORGANIZED HEALTH CARE EDUCATION/TRAINING PROGRAM

## 2025-01-20 PROCEDURE — 770020 HCHG ROOM/CARE - TELE (206)

## 2025-01-20 PROCEDURE — 99024 POSTOP FOLLOW-UP VISIT: CPT | Performed by: NURSE PRACTITIONER

## 2025-01-20 PROCEDURE — 700102 HCHG RX REV CODE 250 W/ 637 OVERRIDE(OP): Performed by: INTERNAL MEDICINE

## 2025-01-20 PROCEDURE — 700105 HCHG RX REV CODE 258: Performed by: STUDENT IN AN ORGANIZED HEALTH CARE EDUCATION/TRAINING PROGRAM

## 2025-01-20 PROCEDURE — 99233 SBSQ HOSP IP/OBS HIGH 50: CPT | Performed by: INTERNAL MEDICINE

## 2025-01-20 PROCEDURE — 02PA3MZ REMOVAL OF CARDIAC LEAD FROM HEART, PERCUTANEOUS APPROACH: ICD-10-PCS | Performed by: INTERNAL MEDICINE

## 2025-01-20 PROCEDURE — 83735 ASSAY OF MAGNESIUM: CPT

## 2025-01-20 PROCEDURE — 85025 COMPLETE CBC W/AUTO DIFF WBC: CPT

## 2025-01-20 RX ORDER — POTASSIUM CHLORIDE 1500 MG/1
40 TABLET, EXTENDED RELEASE ORAL ONCE
Status: COMPLETED | OUTPATIENT
Start: 2025-01-20 | End: 2025-01-20

## 2025-01-20 RX ADMIN — ONDANSETRON 4 MG: 2 INJECTION INTRAMUSCULAR; INTRAVENOUS at 21:22

## 2025-01-20 RX ADMIN — PIPERACILLIN AND TAZOBACTAM 4.5 G: 4; .5 INJECTION, POWDER, FOR SOLUTION INTRAVENOUS at 13:13

## 2025-01-20 RX ADMIN — PIPERACILLIN AND TAZOBACTAM 4.5 G: 4; .5 INJECTION, POWDER, FOR SOLUTION INTRAVENOUS at 06:11

## 2025-01-20 RX ADMIN — OXYCODONE 5 MG: 5 TABLET ORAL at 21:11

## 2025-01-20 RX ADMIN — OXYCODONE 2.5 MG: 5 TABLET ORAL at 17:31

## 2025-01-20 RX ADMIN — PIPERACILLIN AND TAZOBACTAM 4.5 G: 4; .5 INJECTION, POWDER, FOR SOLUTION INTRAVENOUS at 21:19

## 2025-01-20 RX ADMIN — ACETAMINOPHEN 1000 MG: 500 TABLET ORAL at 17:32

## 2025-01-20 RX ADMIN — ACETAMINOPHEN 1000 MG: 500 TABLET ORAL at 13:03

## 2025-01-20 RX ADMIN — OXYCODONE 5 MG: 5 TABLET ORAL at 06:07

## 2025-01-20 RX ADMIN — DIBASIC SODIUM PHOSPHATE, MONOBASIC POTASSIUM PHOSPHATE AND MONOBASIC SODIUM PHOSPHATE 500 MG: 852; 155; 130 TABLET ORAL at 09:23

## 2025-01-20 RX ADMIN — ACETAMINOPHEN 1000 MG: 500 TABLET ORAL at 06:06

## 2025-01-20 RX ADMIN — ACETAMINOPHEN 1000 MG: 500 TABLET ORAL at 00:15

## 2025-01-20 RX ADMIN — ENOXAPARIN SODIUM 40 MG: 100 INJECTION SUBCUTANEOUS at 17:32

## 2025-01-20 RX ADMIN — LEVOTHYROXINE SODIUM 88 MCG: 0.09 TABLET ORAL at 06:07

## 2025-01-20 RX ADMIN — DIBASIC SODIUM PHOSPHATE, MONOBASIC POTASSIUM PHOSPHATE AND MONOBASIC SODIUM PHOSPHATE 500 MG: 852; 155; 130 TABLET ORAL at 17:32

## 2025-01-20 RX ADMIN — SENNOSIDES AND DOCUSATE SODIUM 1 TABLET: 50; 8.6 TABLET ORAL at 21:12

## 2025-01-20 RX ADMIN — POTASSIUM CHLORIDE 40 MEQ: 1500 TABLET, EXTENDED RELEASE ORAL at 09:23

## 2025-01-20 RX ADMIN — DIBASIC SODIUM PHOSPHATE, MONOBASIC POTASSIUM PHOSPHATE AND MONOBASIC SODIUM PHOSPHATE 500 MG: 852; 155; 130 TABLET ORAL at 13:03

## 2025-01-20 ASSESSMENT — ENCOUNTER SYMPTOMS
BACK PAIN: 0
CHILLS: 0
COUGH: 0
SPUTUM PRODUCTION: 0
WHEEZING: 0
DIZZINESS: 0
ABDOMINAL PAIN: 1
ORTHOPNEA: 0
NECK PAIN: 0
DOUBLE VISION: 0
NAUSEA: 0
STRIDOR: 0
CONSTIPATION: 0
NEUROLOGICAL NEGATIVE: 1
EYE PAIN: 0
SENSORY CHANGE: 0
DIARRHEA: 0
SHORTNESS OF BREATH: 0
HEADACHES: 0
PALPITATIONS: 0
MUSCULOSKELETAL NEGATIVE: 1
FATIGUE: 1
FEVER: 0
VOMITING: 0
MYALGIAS: 0
FOCAL WEAKNESS: 0
SPEECH CHANGE: 0
TINGLING: 0
BLURRED VISION: 0
TREMORS: 0
HALLUCINATIONS: 0
PHOTOPHOBIA: 0
WEIGHT LOSS: 0

## 2025-01-20 ASSESSMENT — PAIN DESCRIPTION - PAIN TYPE
TYPE: ACUTE PAIN
TYPE: ACUTE PAIN;SURGICAL PAIN
TYPE: SURGICAL PAIN
TYPE: ACUTE PAIN

## 2025-01-20 ASSESSMENT — FIBROSIS 4 INDEX
FIB4 SCORE: 1.39
FIB4 SCORE: 1.39

## 2025-01-20 ASSESSMENT — LIFESTYLE VARIABLES: SUBSTANCE_ABUSE: 0

## 2025-01-20 NOTE — CARE PLAN
The patient is Stable - Low risk of patient condition declining or worsening    Shift Goals  Clinical Goals: HR monitoring, pain management  Patient Goals: go home  Family Goals: JESSICA    Progress made toward(s) clinical / shift goals:  Pt mobilizing well and bradycardia is asymptomatic and stable. EP to consult     Problem: Pain - Standard  Goal: Alleviation of pain or a reduction in pain to the patient’s comfort goal  Outcome: Progressing     Problem: Knowledge Deficit - Standard  Goal: Patient and family/care givers will demonstrate understanding of plan of care, disease process/condition, diagnostic tests and medications  Outcome: Progressing     Problem: Fall Risk  Goal: Patient will remain free from falls  Outcome: Progressing     Problem: Hemodynamics  Goal: Patient's hemodynamics, fluid balance and neurologic status will be stable or improve  Outcome: Progressing     Problem: Mobility  Goal: Patient's capacity to carry out activities will improve  Outcome: Progressing     Problem: Self Care  Goal: Patient will have the ability to perform ADLs independently or with assistance (bathe, groom, dress, toilet and feed)  Outcome: Progressing       Patient is not progressing towards the following goals:

## 2025-01-20 NOTE — PROGRESS NOTES
Cardiology Follow Up Progress Note    Date of Service  1/20/25    Attending Physician  Brandon Contreras M.D.    Chief Complaint     Bradycardia& Hypotension post anesthesia after lap appendectomy.  Status post transvenous pacing.    HPI  Mira Ramirez is a 59 y.o. female admitted 1/16/2025 with abdominal pain, nausea/anorexia x 4 days.  Found acute appendicitis underwent laparoscopy appendectomy 1/16/2025.  Post op course complicated by hemodynamic instability secondary to sinus bradycardia/junctional bradycardia. Initial Cardiology consult done by Dr. Mcgee 1/17 with placement of temporary transvenous pacemaker.     PMH: Ulcerative colitis, chronic anemia, hypothyroid      Interim Events    No overnight cardiac events  Telemetry-HR 40-78, pacemaker has been off since 1/19.   SBP 120s.   EP consult today.       Review of Systems  Review of Systems   Constitutional:  Positive for fatigue.   Respiratory:  Negative for cough, shortness of breath, wheezing and stridor.    Cardiovascular:  Negative for chest pain, palpitations and leg swelling.   Gastrointestinal:  Positive for abdominal pain.   Genitourinary: Negative.    Musculoskeletal: Negative.    Neurological: Negative.        Vital signs in last 24 hours  Temp:  [36.5 °C (97.7 °F)-36.9 °C (98.4 °F)] 36.9 °C (98.4 °F)  Pulse:  [40-78] 72  Resp:  [14-39] 33  BP: ()/(64-87) 128/84  SpO2:  [90 %-98 %] 92 %    Physical Exam  Physical Exam  Constitutional:       General: She is not in acute distress.     Appearance: Normal appearance. She is normal weight. She is not ill-appearing or toxic-appearing.   HENT:      Mouth/Throat:      Mouth: Mucous membranes are moist.   Eyes:      Extraocular Movements: Extraocular movements intact.      Conjunctiva/sclera: Conjunctivae normal.      Pupils: Pupils are equal, round, and reactive to light.   Cardiovascular:      Rate and Rhythm: Regular rhythm. Bradycardia present.      Pulses: Normal pulses.      Heart  Labs normal. Resulted to mychart. "sounds: Normal heart sounds. No murmur heard.     No gallop.      Comments: HR 40-50  Pulmonary:      Effort: Pulmonary effort is normal. No respiratory distress.      Breath sounds: Normal breath sounds. No wheezing, rhonchi or rales.   Abdominal:      General: Abdomen is flat. Bowel sounds are normal.      Palpations: Abdomen is soft.      Tenderness: There is no abdominal tenderness.   Musculoskeletal:      Right lower leg: No edema.      Left lower leg: No edema.   Skin:     General: Skin is warm.      Capillary Refill: Capillary refill takes less than 2 seconds.   Neurological:      General: No focal deficit present.      Mental Status: She is alert and oriented to person, place, and time. Mental status is at baseline.   Psychiatric:         Mood and Affect: Mood normal.         Behavior: Behavior normal.         Thought Content: Thought content normal.         Judgment: Judgment normal.         Lab Review  Lab Results   Component Value Date/Time    WBC 7.8 01/20/2025 01:48 AM    RBC 3.57 (L) 01/20/2025 01:48 AM    HEMOGLOBIN 10.2 (L) 01/20/2025 01:48 AM    HEMATOCRIT 32.3 (L) 01/20/2025 01:48 AM    MCV 90.5 01/20/2025 01:48 AM    MCH 28.6 01/20/2025 01:48 AM    MCHC 31.6 (L) 01/20/2025 01:48 AM    MPV 9.0 01/20/2025 01:48 AM      Lab Results   Component Value Date/Time    SODIUM 138 01/20/2025 01:48 AM    POTASSIUM 3.6 01/20/2025 01:48 AM    CHLORIDE 105 01/20/2025 01:48 AM    CO2 23 01/20/2025 01:48 AM    GLUCOSE 97 01/20/2025 01:48 AM    BUN 13 01/20/2025 01:48 AM    CREATININE 0.59 01/20/2025 01:48 AM    GLOMRATE 74 04/10/2023 05:22 PM      Lab Results   Component Value Date/Time    ASTSGOT 24 01/16/2025 04:26 AM    ALTSGPT 12 01/16/2025 04:26 AM     Lab Results   Component Value Date/Time    CHOLSTRLTOT 178 09/26/2024 02:31 PM    LDL 91 09/26/2024 02:31 PM    HDL 44 09/26/2024 02:31 PM    TRIGLYCERIDE 217 (H) 09/26/2024 02:31 PM    TROPONINT 8 01/17/2025 01:03 AM       No results for input(s): \"NTPROBNP\" " in the last 72 hours.    Cardiac Imaging and Procedures Review  [id number] with sinus bradycardia and junctional rhythm, possible high degree AV block.      Echocardiogram:    CONCLUSIONS  No prior study is available for comparison.   Normal left ventricular systolic function.   No evidence of valvular abnormality based on Doppler evaluation.           Imaging  Chest X-Ray:     Left basilar atelectasis, no focal infiltrate            Assessment/Plan    Symptomatic bradycardia, junctional bradycardia   Hemodynamic instability with hypotension & bradycardia post anesthesia for lap appendectomy. Suspect this was secondary to increased vagal tone in the setting of intraabdominal infection and surgery.   -S/P transvenous pacing 2025.  Turned off on  and HR has improved to 40-70s with sinus rhythm on EKG. No hemodynamic instablity noted.   -EP consult pending today. Most likely will not need PPM.   -Echo reassuring.    Septic shock - secondary to acute suppurative appendicitis status post lap appendectomy 2025.  Shock has resolved.       Thank you for the consult. IP cardiology team will continue to follow. Current assessment and POC discussed with patient and Dr. Bermudez.     Dr. Cate Aguillon MD   [facility] IM PGY 3 Resident    Please refer to Dr. Bermudez's attestation for additional comments/recommendations.

## 2025-01-20 NOTE — ASSESSMENT & PLAN NOTE
Patient found to have hypophosphatemia.  I ordered phosphate replacement  Plan is to recheck Phosphate level tomorrow.

## 2025-01-20 NOTE — PROGRESS NOTES
"  DATE: 1/20/2025 1/16 Laparoscopic appendectomy.  Superlative appendicitis with no perforation.  1/17 Transvenous pacer placed secondary to symptomatic bradycardia.    INTERVAL EVENTS:    Appropriate postoperative tenderness.  Normal white blood cell count.  Tolerating oral diet.    PHYSICAL EXAMINATION:  Vital Signs: /84   Pulse 72   Temp 36.5 °C (97.7 °F)   Resp (!) 33   Ht 1.549 m (5' 0.98\")   Wt 59.1 kg (130 lb 4.7 oz)   SpO2 92%     Constitutional: No acute distress.  Afebrile.  Nontoxic in appearance.  Neurologic: Conversant.  Respiratory: Unlabored.  Cardiac: Regular rate.  Abdomen: Soft with appropriate postoperative tenderness.  Port sites approximated with no overt signs and symptoms of infection.  Tolerating oral diet.    LABORATORY VALUES:  Recent Labs     01/18/25 0347 01/19/25  0341 01/20/25  0148   WBC 8.2 8.6 7.8   RBC 3.40* 3.32* 3.57*   HEMOGLOBIN 9.7* 9.3* 10.2*   HEMATOCRIT 31.2* 30.5* 32.3*   MCV 91.8 91.9 90.5   MCH 28.5 28.0 28.6   MCHC 31.1* 30.5* 31.6*   RDW 51.1* 51.3* 48.4   PLATELETCT 235 277 304   MPV 10.0 9.4 9.0     Recent Labs     01/18/25 0347 01/19/25  0341 01/20/25  0148   SODIUM 139 138 138   POTASSIUM 4.2 3.9 3.6   CHLORIDE 106 106 105   CO2 22 24 23   GLUCOSE 157* 97 97   BUN 18 24* 13   CREATININE 0.47* 0.63 0.59   CALCIUM 8.5 8.2* 8.4*                IMAGING:  EC-ECHOCARDIOGRAM COMPLETE W/O CONT   Final Result      DX-CHEST-FOR LINE PLACEMENT Perform procedure in: Patient's Room   Final Result         1.  Left basilar atelectasis, no focal infiltrate      US-ABDOMEN LTD (SOFT TISSUE)   Final Result      Trace perihepatic fluid. No other significant fluid or focal fluid collection is visualized.      CT-ABDOMEN-PELVIS WITH   Final Result         1.  Changes of acute appendicitis   2.  Fluid-filled dilated small bowel loops, appearance favoring component of ileus   3.  Trace of pleural effusion      These findings were discussed with the patient's clinician, " Matt Lema, on 1/16/2025 5:58 AM.      CL-TEMPORARY PACEMAKER INSERT    (Results Pending)       ASSESSMENT AND PLAN:  Arrhythmia  Assessment & Plan  1/17 Temporary transvenous pacemaker placed for symptomatic bradycardia.  1/19 Paced rhythm.   1/21 Electrophysiologist to see.  Dmitry Eduardo M.D., Interventional Radiology.       Hypotension- (present on admission)  Assessment & Plan  Postoperative hypotension refractory to 1.5 L fluid bolus.  Abdominal ultrasound with no significant free fluid.  Hemoglobin 10.3. Cortisol 10.9.     Appendicitis, acute- (present on admission)  Assessment & Plan  1/16 Laparoscopic appendectomy.  Superlative appendicitis with no perforation.  1/19 PO day 3, continue Zosyn.  1/20 4 day course of Zosyn to complete.  Transition to 7 day course of Augmentin to start 1/21         ___________________________________     OLAYINKA Gong    DD: 1/20/2025  7:22 AM

## 2025-01-20 NOTE — CARE PLAN
The patient is Watcher - Medium risk of patient condition declining or worsening    Shift Goals  Clinical Goals: Pain management, hemodynamic stability  Patient Goals: REST  Family Goals: JESSICA    Progress made toward(s) clinical / shift goals:    Problem: Pain - Standard  Goal: Alleviation of pain or a reduction in pain to the patient’s comfort goal  Outcome: Progressing     Problem: Knowledge Deficit - Standard  Goal: Patient and family/care givers will demonstrate understanding of plan of care, disease process/condition, diagnostic tests and medications  Outcome: Progressing     Problem: Fall Risk  Goal: Patient will remain free from falls  Outcome: Progressing       Patient is not progressing towards the following goals:N/A

## 2025-01-20 NOTE — PROGRESS NOTES
Symptomatic bradycardia without recurrent pacemaker need EP consultation pending defer further management to electrophysiology.  General cardiology will sign off please call if there are further needs or issues.

## 2025-01-20 NOTE — PROGRESS NOTES
Hospital Medicine Daily Progress Note    Date of Service  1/20/2025    Chief Complaint  Mira Ramirez is a 59 y.o. female admitted 1/16/2025 with abdominal pain    Hospital Course    59 y.o. female with past medical history of ulcerative colitis who presented to the hospital on 1/16/2025 with lower abdominal pain.  She was diagnosed with acute appendicitis and she underwent laparoscopic appendectomy on January 16, 2025.  Patient developed bradycardia and she admitted to surgical ICU.  Due to her symptomatic and significant bradycardia cardiology was consulted and she underwent transvenous pacer on January 17, 2025.     On January 18, 2025 surgical team requested patient transferred to [facility] for ongoing bradycardia and requested hospitalist service consultation.    Interval Problem Update    01/20/25    I evaluated and examined her at the bedside.  She reported her abdominal pain has been improving.  She found to have hypophosphatemia I ordered phosphate replacement.  Her current potassium level is 3.6 to keep it above 4 I ordered 40 mEq of potassium.  CBC showed white blood cell count of 7.8, hemoglobin of 10.2 and platelet count of 304.  Echocardiogram did not show any acute abnormalities  Cardiology is following her.    I reviewed monitor summary that showed heart rate ranging between 40-78  I personally discussed plan of care with EP cardiologist Dr. Julio and he recommended that he she will need outpatient follow-up and she does not need pacemaker at this time.    I evaluated her again at the bedside and I discussed and abated plan from cardiology standpoint.  Patient expressed that she does not feel comfortable and safe going home today and she would like to be discharged tomorrow    I discussed plan of care with general surgery team.      I have discussed this patient's plan of care and discharge plan at IDT rounds today with Case Management, Nursing, Nursing leadership, and other members of the IDT  team.    Consultants/Specialty  cardiology and general surgery    Code Status  Full Code    Disposition  The patient is not medically cleared for discharge to home or a post-acute facility.      I have placed the appropriate orders for post-discharge needs.    Review of Systems  Review of Systems   Constitutional:  Negative for chills, fever and weight loss.   HENT:  Negative for hearing loss and tinnitus.    Eyes:  Negative for blurred vision, double vision, photophobia and pain.   Respiratory:  Negative for cough, sputum production and shortness of breath.    Cardiovascular:  Negative for chest pain, palpitations, orthopnea and leg swelling.   Gastrointestinal:  Positive for abdominal pain. Negative for constipation, diarrhea, nausea and vomiting.   Genitourinary:  Negative for dysuria, frequency and urgency.   Musculoskeletal:  Negative for back pain, joint pain, myalgias and neck pain.   Skin:  Negative for rash.   Neurological:  Negative for dizziness, tingling, tremors, sensory change, speech change, focal weakness and headaches.   Psychiatric/Behavioral:  Negative for hallucinations and substance abuse.    All other systems reviewed and are negative.       Physical Exam  Temp:  [36.2 °C (97.1 °F)-36.9 °C (98.4 °F)] 36.9 °C (98.4 °F)  Pulse:  [40-78] 72  Resp:  [14-39] 33  BP: ()/(64-94) 128/84  SpO2:  [90 %-98 %] 92 %    Physical Exam  Vitals reviewed.   Constitutional:       General: She is not in acute distress.     Appearance: Normal appearance. She is not ill-appearing.   HENT:      Head: Normocephalic and atraumatic.      Nose: No congestion.   Eyes:      General:         Right eye: No discharge.         Left eye: No discharge.      Pupils: Pupils are equal, round, and reactive to light.   Cardiovascular:      Rate and Rhythm: Regular rhythm. Bradycardia present.      Pulses: Normal pulses.      Heart sounds: Normal heart sounds. No murmur heard.  Pulmonary:      Effort: Pulmonary effort is normal.  No respiratory distress.      Breath sounds: Normal breath sounds. No stridor.   Abdominal:      General: Bowel sounds are normal. There is no distension.      Palpations: Abdomen is soft.      Tenderness: There is abdominal tenderness.   Musculoskeletal:         General: No swelling or tenderness. Normal range of motion.      Cervical back: Normal range of motion. No rigidity.   Skin:     General: Skin is warm.      Capillary Refill: Capillary refill takes less than 2 seconds.      Coloration: Skin is not jaundiced or pale.      Findings: No bruising.   Neurological:      General: No focal deficit present.      Mental Status: She is alert and oriented to person, place, and time.      Cranial Nerves: No cranial nerve deficit.   Psychiatric:         Mood and Affect: Mood normal.         Behavior: Behavior normal.         Fluids    Intake/Output Summary (Last 24 hours) at 1/20/2025 0819  Last data filed at 1/20/2025 0400  Gross per 24 hour   Intake --   Output 1700 ml   Net -1700 ml        Laboratory  Recent Labs     01/18/25 0347 01/19/25  0341 01/20/25  0148   WBC 8.2 8.6 7.8   RBC 3.40* 3.32* 3.57*   HEMOGLOBIN 9.7* 9.3* 10.2*   HEMATOCRIT 31.2* 30.5* 32.3*   MCV 91.8 91.9 90.5   MCH 28.5 28.0 28.6   MCHC 31.1* 30.5* 31.6*   RDW 51.1* 51.3* 48.4   PLATELETCT 235 277 304   MPV 10.0 9.4 9.0     Recent Labs     01/18/25 0347 01/19/25  0341 01/20/25  0148   SODIUM 139 138 138   POTASSIUM 4.2 3.9 3.6   CHLORIDE 106 106 105   CO2 22 24 23   GLUCOSE 157* 97 97   BUN 18 24* 13   CREATININE 0.47* 0.63 0.59   CALCIUM 8.5 8.2* 8.4*                   Imaging  EC-ECHOCARDIOGRAM COMPLETE W/O CONT   Final Result      DX-CHEST-FOR LINE PLACEMENT Perform procedure in: Patient's Room   Final Result         1.  Left basilar atelectasis, no focal infiltrate      US-ABDOMEN LTD (SOFT TISSUE)   Final Result      Trace perihepatic fluid. No other significant fluid or focal fluid collection is visualized.      CT-ABDOMEN-PELVIS WITH    Final Result         1.  Changes of acute appendicitis   2.  Fluid-filled dilated small bowel loops, appearance favoring component of ileus   3.  Trace of pleural effusion      These findings were discussed with the patient's clinician, Matt Lema, on 1/16/2025 5:58 AM.      CL-TEMPORARY PACEMAKER INSERT    (Results Pending)        Assessment/Plan  * Abdominal pain- (present on admission)  Assessment & Plan  Now abdominal pain has improved.  Continue antibiotic.  Overall improving.  Tolerating diet.      Hypophosphatemia  Assessment & Plan  Patient found to have hypophosphatemia.  I ordered phosphate replacement  Plan is to recheck Phosphate level tomorrow.    Bradycardia  Assessment & Plan  Patient has significant bradycardia with heart rate in 40s.  Continue transvenous pacing.  Cardiology is following her.  I reviewed cardiology note.  Plan is to consult for EP tomorrow.  Cardiology service is following her.  Continue to monitor closely in IMCU on telemetry.    Hyperglycemia  Assessment & Plan  Patient went to hyperglycemia last HbA1c was 5.8  Current HbA1c 6.1.  Outpatient follow-up    Arrhythmia  Assessment & Plan  Temporary pacing  I reviewed monitor summary that showed heart rate ranging between 39 to 80s.  Continue monitor closely in IMCU   Cardiology is following her.  Monitor electrolytes and replace as needed.    Hypotension- (present on admission)  Assessment & Plan  Postoperative hypotension refractory to 1.5 L fluid bolus.  Abdominal ultrasound with no significant free fluid.  Hemoglobin 10.3. Cortisol 10.9.   Now blood pressure has improved   Currently transvenous pacer in place cardiology is following therapy  TSH is low and free T4 within normal limits.  Now blood pressure has improved.  Current systolic pressure is 128 mmHg.  Echocardiogram did not show any acute abnormalities    Appendicitis, acute- (present on admission)  Assessment & Plan  Patient went laparoscopic  cholecystectomy.  Surgery is following her.  Continue IV Zosyn.  White blood cell count is within normal limits  General Surgery is following her.           I discussed plan of care during multidisciplinary rounds regarding patient's current medical condition and plan of care.    I personally discussed case with cardiologist Dr. Julio.    I discussed plan of care with general surgery team.    >51 minutes total time spent in records review, lab/radiology assessment, patient history and assessment, and directing plan of care with high level medical decision making complexity. See orders.      VTE prophylaxis:    enoxaparin ppx      I have performed a physical exam and reviewed and updated ROS and Plan today (1/20/2025). In review of yesterday's note (1/19/2025), there are no changes except as documented above.

## 2025-01-20 NOTE — CARE PLAN
The patient is Watcher - Medium risk of patient condition declining or worsening    Shift Goals  Clinical Goals: Pain management, hemodynamic stability  Patient Goals: pain control  Family Goals: Updates    Progress made toward(s) clinical / shift goals:     Problem: Pain - Standard  Goal: Alleviation of pain or a reduction in pain to the patient’s comfort goal  Outcome: Progressing     Problem: Knowledge Deficit - Standard  Goal: Patient and family/care givers will demonstrate understanding of plan of care, disease process/condition, diagnostic tests and medications  Outcome: Progressing     Problem: Fall Risk  Goal: Patient will remain free from falls  Outcome: Progressing       Patient is not progressing towards the following goals:

## 2025-01-21 ENCOUNTER — PHARMACY VISIT (OUTPATIENT)
Dept: PHARMACY | Facility: MEDICAL CENTER | Age: 60
End: 2025-01-21
Payer: COMMERCIAL

## 2025-01-21 VITALS
OXYGEN SATURATION: 90 % | RESPIRATION RATE: 17 BRPM | DIASTOLIC BLOOD PRESSURE: 80 MMHG | SYSTOLIC BLOOD PRESSURE: 127 MMHG | BODY MASS INDEX: 24.6 KG/M2 | TEMPERATURE: 97.7 F | HEIGHT: 61 IN | HEART RATE: 55 BPM | WEIGHT: 130.29 LBS

## 2025-01-21 LAB
ANION GAP SERPL CALC-SCNC: 10 MMOL/L (ref 7–16)
BASOPHILS # BLD AUTO: 0.2 % (ref 0–1.8)
BASOPHILS # BLD: 0.01 K/UL (ref 0–0.12)
BUN SERPL-MCNC: 12 MG/DL (ref 8–22)
CALCIUM SERPL-MCNC: 8.7 MG/DL (ref 8.5–10.5)
CHLORIDE SERPL-SCNC: 104 MMOL/L (ref 96–112)
CO2 SERPL-SCNC: 23 MMOL/L (ref 20–33)
CREAT SERPL-MCNC: 0.57 MG/DL (ref 0.5–1.4)
EOSINOPHIL # BLD AUTO: 0.17 K/UL (ref 0–0.51)
EOSINOPHIL NFR BLD: 2.7 % (ref 0–6.9)
ERYTHROCYTE [DISTWIDTH] IN BLOOD BY AUTOMATED COUNT: 46 FL (ref 35.9–50)
GFR SERPLBLD CREATININE-BSD FMLA CKD-EPI: 103 ML/MIN/1.73 M 2
GLUCOSE SERPL-MCNC: 88 MG/DL (ref 65–99)
HCT VFR BLD AUTO: 32.3 % (ref 37–47)
HGB BLD-MCNC: 10.4 G/DL (ref 12–16)
IMM GRANULOCYTES # BLD AUTO: 0.03 K/UL (ref 0–0.11)
IMM GRANULOCYTES NFR BLD AUTO: 0.5 % (ref 0–0.9)
LYMPHOCYTES # BLD AUTO: 2.13 K/UL (ref 1–4.8)
LYMPHOCYTES NFR BLD: 33.9 % (ref 22–41)
MAGNESIUM SERPL-MCNC: 2.1 MG/DL (ref 1.5–2.5)
MCH RBC QN AUTO: 28.2 PG (ref 27–33)
MCHC RBC AUTO-ENTMCNC: 32.2 G/DL (ref 32.2–35.5)
MCV RBC AUTO: 87.5 FL (ref 81.4–97.8)
MONOCYTES # BLD AUTO: 0.42 K/UL (ref 0–0.85)
MONOCYTES NFR BLD AUTO: 6.7 % (ref 0–13.4)
NEUTROPHILS # BLD AUTO: 3.52 K/UL (ref 1.82–7.42)
NEUTROPHILS NFR BLD: 56 % (ref 44–72)
NRBC # BLD AUTO: 0 K/UL
NRBC BLD-RTO: 0 /100 WBC (ref 0–0.2)
PHOSPHATE SERPL-MCNC: 4.1 MG/DL (ref 2.5–4.5)
PLATELET # BLD AUTO: 341 K/UL (ref 164–446)
PMV BLD AUTO: 8.8 FL (ref 9–12.9)
POTASSIUM SERPL-SCNC: 3.6 MMOL/L (ref 3.6–5.5)
RBC # BLD AUTO: 3.69 M/UL (ref 4.2–5.4)
SODIUM SERPL-SCNC: 137 MMOL/L (ref 135–145)
WBC # BLD AUTO: 6.3 K/UL (ref 4.8–10.8)

## 2025-01-21 PROCEDURE — 83735 ASSAY OF MAGNESIUM: CPT

## 2025-01-21 PROCEDURE — 84100 ASSAY OF PHOSPHORUS: CPT

## 2025-01-21 PROCEDURE — A9270 NON-COVERED ITEM OR SERVICE: HCPCS | Performed by: INTERNAL MEDICINE

## 2025-01-21 PROCEDURE — 85025 COMPLETE CBC W/AUTO DIFF WBC: CPT

## 2025-01-21 PROCEDURE — RXMED WILLOW AMBULATORY MEDICATION CHARGE: Performed by: INTERNAL MEDICINE

## 2025-01-21 PROCEDURE — 700102 HCHG RX REV CODE 250 W/ 637 OVERRIDE(OP): Performed by: STUDENT IN AN ORGANIZED HEALTH CARE EDUCATION/TRAINING PROGRAM

## 2025-01-21 PROCEDURE — 80048 BASIC METABOLIC PNL TOTAL CA: CPT

## 2025-01-21 PROCEDURE — 700102 HCHG RX REV CODE 250 W/ 637 OVERRIDE(OP): Performed by: NURSE PRACTITIONER

## 2025-01-21 PROCEDURE — 99239 HOSP IP/OBS DSCHRG MGMT >30: CPT | Performed by: INTERNAL MEDICINE

## 2025-01-21 PROCEDURE — 700102 HCHG RX REV CODE 250 W/ 637 OVERRIDE(OP): Performed by: INTERNAL MEDICINE

## 2025-01-21 PROCEDURE — A9270 NON-COVERED ITEM OR SERVICE: HCPCS | Performed by: NURSE PRACTITIONER

## 2025-01-21 PROCEDURE — A9270 NON-COVERED ITEM OR SERVICE: HCPCS | Performed by: STUDENT IN AN ORGANIZED HEALTH CARE EDUCATION/TRAINING PROGRAM

## 2025-01-21 RX ORDER — ONDANSETRON 4 MG/1
4 TABLET, ORALLY DISINTEGRATING ORAL EVERY 4 HOURS PRN
Qty: 10 TABLET | Refills: 0 | Status: SHIPPED | OUTPATIENT
Start: 2025-01-21

## 2025-01-21 RX ORDER — OXYCODONE HYDROCHLORIDE 5 MG/1
2.5 TABLET ORAL EVERY 6 HOURS PRN
Qty: 9 TABLET | Refills: 0 | Status: SHIPPED | OUTPATIENT
Start: 2025-01-21 | End: 2025-01-26

## 2025-01-21 RX ADMIN — ACETAMINOPHEN 1000 MG: 500 TABLET ORAL at 00:57

## 2025-01-21 RX ADMIN — LEVOTHYROXINE SODIUM 88 MCG: 0.09 TABLET ORAL at 05:36

## 2025-01-21 RX ADMIN — AMOXICILLIN AND CLAVULANATE POTASSIUM 1 TABLET: 875; 125 TABLET, FILM COATED ORAL at 05:36

## 2025-01-21 RX ADMIN — ACETAMINOPHEN 1000 MG: 500 TABLET ORAL at 05:35

## 2025-01-21 RX ADMIN — DIBASIC SODIUM PHOSPHATE, MONOBASIC POTASSIUM PHOSPHATE AND MONOBASIC SODIUM PHOSPHATE 500 MG: 852; 155; 130 TABLET ORAL at 00:57

## 2025-01-21 RX ADMIN — POLYETHYLENE GLYCOL 3350 1 PACKET: 17 POWDER, FOR SOLUTION ORAL at 05:35

## 2025-01-21 RX ADMIN — DOCUSATE SODIUM 100 MG: 100 CAPSULE, LIQUID FILLED ORAL at 05:37

## 2025-01-21 ASSESSMENT — COGNITIVE AND FUNCTIONAL STATUS - GENERAL
CLIMB 3 TO 5 STEPS WITH RAILING: A LITTLE
WALKING IN HOSPITAL ROOM: A LITTLE
SUGGESTED CMS G CODE MODIFIER DAILY ACTIVITY: CJ
DRESSING REGULAR LOWER BODY CLOTHING: A LITTLE
MOVING FROM LYING ON BACK TO SITTING ON SIDE OF FLAT BED: A LITTLE
DRESSING REGULAR UPPER BODY CLOTHING: A LITTLE
MOVING TO AND FROM BED TO CHAIR: A LITTLE
STANDING UP FROM CHAIR USING ARMS: A LITTLE
TURNING FROM BACK TO SIDE WHILE IN FLAT BAD: A LITTLE
HELP NEEDED FOR BATHING: A LITTLE
SUGGESTED CMS G CODE MODIFIER MOBILITY: CK
MOBILITY SCORE: 18
DAILY ACTIVITIY SCORE: 20
TOILETING: A LITTLE

## 2025-01-21 ASSESSMENT — PATIENT HEALTH QUESTIONNAIRE - PHQ9
SUM OF ALL RESPONSES TO PHQ9 QUESTIONS 1 AND 2: 0
2. FEELING DOWN, DEPRESSED, IRRITABLE, OR HOPELESS: NOT AT ALL
1. LITTLE INTEREST OR PLEASURE IN DOING THINGS: NOT AT ALL

## 2025-01-21 ASSESSMENT — PAIN DESCRIPTION - PAIN TYPE: TYPE: SURGICAL PAIN

## 2025-01-21 NOTE — DISCHARGE PLANNING
Care Transition Team Assessment    Patient, Mira Ramirez, is a 59-year-old admitted for abdominal pain. Patient is alert and oriented x4; information was given by the patient. Please see pt's H&P for prior medical history. Patient reports living alone in a two story home with one step to enter; [address] [place] [address]. Pt confirmed contacts on facesheet, son Bushra (lives locally) [phone] and daughter Annette (lives in [place]) [phone]. Patient does have a PCP, Chava Senior. Patient reports good support from son. Patient does have transportation once medically cleared for discharge, reports son can . Patient reports no concerns with food insecurity. Confirmed medical insurance is [employer]. Preferred pharmacy is [facility] in [facility]. Patient is independent. Patient utilizes no DME. Patient denies concerns and history with substance use and mental health.    Identified DC needs at this time:    None    Information Source  Orientation Level: Oriented X4  Information Given By: Patient  Who is responsible for making decisions for patient? : Patient    Readmission Evaluation  Is this a readmission?: No    Elopement Risk  Legal Hold: No  Ambulatory or Self Mobile in Wheelchair: Yes  Disoriented: No  Psychiatric Symptoms: None  History of Wandering: No  Elopement this Admit: No  Vocalizing Wanting to Leave: No  Displays Behaviors, Body Language Wanting to Leave: No-Not at Risk for Elopement  Elopement Risk: Not at Risk for Elopement    Interdisciplinary Discharge Planning  Primary Care Physician: Chava Senior  Patient or legal guardian wants to designate a caregiver: No  Support Systems: Family Member(s)  Housing / Facility: 2 Story House    Discharge Preparedness  What are your discharge supports?: Child  Prior Functional Level: Ambulatory, Drives Self, Independent with Activities of Daily Living, Independent with Medication Management    Functional Assesment  Prior  Functional Level: Ambulatory, Drives Self, Independent with Activities of Daily Living, Independent with Medication Management    Domestic Abuse  Have you ever been the victim of abuse or violence?: No    Psychological Assessment  History of Substance Abuse: None  History of Psychiatric Problems: No    Anticipated Discharge Information  Discharge Disposition: Discharged to home/self care (01)

## 2025-01-21 NOTE — PROGRESS NOTES
Discharge orders received.  Patient arrived to the discharge lounge.  PIV removed by floor RN. Meds to beds medications verified by discharge RN, bag of medications given to patient.  Instructions given, medications reviewed and general discharge education provided to patient.  Follow up appointments discussed.  Patient verbalized understanding of dc instructions and prescriptions.  Patient signed discharge instructions.  Patient verbalized she had all belongings with her, Denied having any home medications locked in our inpatient pharmacy that  she needs back. Patient wheeled from discharge lounge to private vehicle. Patient left via car with son to home in stable condition.

## 2025-01-21 NOTE — PROGRESS NOTES
2 RN Skin Assessment Completed by NAVIN Albert and ÁLVARO Hunt.     Head: WDL  Ears: WDL  Nose: WDL  Mouth: WDL  Neck: bruising  Breasts/Chest: WDL  Shoulder Blades: WDL  Spine: WDL  (R) Arm/Elbow/hand: bruising  (L) Arm/Elbow/hand: bruising  Abdomen: 3 lap sites, CDI.  Groin: WDL  Sacrum/Coccyx/Buttocks: blanching  (R) Leg: WDL  (L) Leg: WDL  (R) Heel/Foot/Toe: blanching  (L) Heel/Foot/Toe: blanching              Devices in place: BP Cuff and Pulse Ox     Interventions in place: Gray ear foams and Pillows     Possible skin injury found: No     Pictures uploaded into Epic: N/A  Wound Consult Placed: N/A

## 2025-01-21 NOTE — PROGRESS NOTES
Bedside report received from Bety HOLDEN. Pt assessment complete. Pt AO x 4. Reviewed plan of care with pt. Pt is tele monitored. Chart and labs reviewed. Bed in lowest position, and 2 side rails up. Pt educated on call lights, call light within reach. Hourly rounding in place

## 2025-01-21 NOTE — PROGRESS NOTES
Assessment completed. Pt A&Ox4. Respirations are even and unlabored on 1L n/c. Pt denies pain at this time. Monitors applied, call light and belongings within reach. POC updated (monitor overnight). Pt educated on room and call light, pt verbalized understanding. Communication board updated. Needs met.

## 2025-01-21 NOTE — PROGRESS NOTES
"Transvenous pacer wires removed by Dr. Julio approx 1500. Told pt that he does not believe she needs a permanent pacemaker, pt expressed desire for second opinion since she has had \"problems with [her] heart 4 times now.\" Assured pt that we could arrange for an outpatient cardiology follow-up to discuss concerns and future monitoring. Dr Cullen put in orders for Telemetry to monitor for continuing bradycardia at least overnight, to which the patient was very amenable.   "

## 2025-01-21 NOTE — CARE PLAN
The patient is Watcher - Medium risk of patient condition declining or worsening    Shift Goals  Clinical Goals: HR monitoring, pain management  Patient Goals: go home  Family Goals: JESSICA    Progress made toward(s) clinical / shift goals:    Problem: Pain - Standard  Goal: Alleviation of pain or a reduction in pain to the patient’s comfort goal  Outcome: Progressing     Problem: Knowledge Deficit - Standard  Goal: Patient and family/care givers will demonstrate understanding of plan of care, disease process/condition, diagnostic tests and medications  Outcome: Progressing     Problem: Fall Risk  Goal: Patient will remain free from falls  Outcome: Progressing     Problem: Hemodynamics  Goal: Patient's hemodynamics, fluid balance and neurologic status will be stable or improve  Outcome: Progressing     Problem: Mobility  Goal: Patient's capacity to carry out activities will improve  Outcome: Progressing     Problem: Self Care  Goal: Patient will have the ability to perform ADLs independently or with assistance (bathe, groom, dress, toilet and feed)  Outcome: Progressing       Patient is not progressing towards the following goals:

## 2025-01-21 NOTE — PROGRESS NOTES
Monitor summary per monitor tech report:    Sinusbrady w/1st degree, rate 44-53  No ectopy  .22/.05/.42

## 2025-01-22 LAB
BACTERIA BLD CULT: NORMAL
BACTERIA BLD CULT: NORMAL
SIGNIFICANT IND 70042: NORMAL
SIGNIFICANT IND 70042: NORMAL
SITE SITE: NORMAL
SITE SITE: NORMAL
SOURCE SOURCE: NORMAL
SOURCE SOURCE: NORMAL

## 2025-01-22 NOTE — HOSPITAL COURSE
59 y.o. female with past medical history of ulcerative colitis who presented to the hospital on 1/16/2025 with lower abdominal pain.  She was diagnosed with acute appendicitis and she underwent laparoscopic appendectomy on January 16, 2025.  Patient developed bradycardia and she admitted to surgical ICU.  Due to her symptomatic and significant bradycardia cardiology was consulted and she underwent transvenous pacer on January 17, 2025.   On January 18, 2025 surgical team requested patient transferred to [facility] for ongoing bradycardia and requested hospitalist service consultation. Temporaty pacing catheter removed 01/20. Ongoing care by electrophysiology, determined in tact sinus node function with no intrinsic conduction issues warranting need for permanent device. Patient was further monitored with no further bradycardic events noted. Patient improved clinically and was agreeable to discharge. Patient was determined satisfactory for discharge with appropriate follow up.

## 2025-01-22 NOTE — DISCHARGE SUMMARY
Discharge Summary    CHIEF COMPLAINT ON ADMISSION  Chief Complaint   Patient presents with    Abdominal Pain     Lower abd pain 10/10 for two days.     Diarrhea     For 3 days        Reason for Admission  Abdominal pain. SOB.     Admission Date  1/16/2025    CODE STATUS  Prior    HPI & HOSPITAL COURSE  59 y.o. female with past medical history of ulcerative colitis who presented to the hospital on 1/16/2025 with lower abdominal pain.  She was diagnosed with acute appendicitis and she underwent laparoscopic appendectomy on January 16, 2025.  Patient developed bradycardia and she admitted to surgical ICU.  Due to her symptomatic and significant bradycardia cardiology was consulted and she underwent transvenous pacer on January 17, 2025.   On January 18, 2025 surgical team requested patient transferred to [facility] [place] for ongoing bradycardia and requested hospitalist service consultation. Temporaty pacing catheter removed 01/20. Ongoing care by electrophysiology, determined in tact sinus node function with no intrinsic conduction issues warranting need for permanent device. Patient was further monitored with no further bradycardic events noted. Patient improved clinically and was agreeable to discharge. Patient was determined satisfactory for discharge with appropriate follow up.    Therefore, she is discharged in fair and stable condition to home with close outpatient follow-up.    The patient met 2-midnight criteria for an inpatient stay at the time of discharge.    Discharge Date  1/21/2025    FOLLOW UP ITEMS POST DISCHARGE  Please follow up with PCP in 3-5 days for post hospitalization follow up and medication reconciliation.     DISCHARGE DIAGNOSES  Principal Problem:    Abdominal pain (POA: Yes)  Active Problems:    Appendicitis, acute (POA: Yes)    Hypotension (POA: Yes)    Arrhythmia (POA: Clinically Undetermined)      Overview: Significant bradycardia and junctional rhythm     Hyperglycemia (POA: Unknown)    Bradycardia  (POA: Unknown)    Hypophosphatemia (POA: Unknown)  Resolved Problems:    * No resolved hospital problems. *      FOLLOW UP  No future appointments.  [facility]  [address]  [place] [address]  [phone]  Schedule an appointment as soon as possible for a visit in 2 week(s)  Follow up 2 weeks time post operatively.   Status post lap appendectomy.  No perforation.  Superacute of drainage.  Discharged on a 7-day course of Augmentin    Chava Senior M.D.  [address] [facility] [address]  [place] [address] [phone]    Schedule an appointment as soon as possible for a visit in 1 week(s)        MEDICATIONS ON DISCHARGE     Medication List        START taking these medications        Instructions   amoxicillin-clavulanate 875-125 MG Tabs  Commonly known as: Augmentin   Take 1 Tablet by mouth every 12 hours for 7 days.  Dose: 1 Tablet     ondansetron 4 MG Tbdp  Commonly known as: Zofran ODT   Dissolve 1 Tablet by mouth every four hours as needed for Nausea/Vomiting  Dose: 4 mg     oxyCODONE immediate-release 5 MG Tabs  Commonly known as: Roxicodone   Take one-half (0.5) Tablet by mouth every 6 hours as needed for Severe Pain for up to 5 days.  Dose: 2.5 mg            CONTINUE taking these medications        Instructions   acetaminophen 500 MG Tabs  Commonly known as: Tylenol   Take 1,000 mg by mouth 3 times a day as needed for Mild Pain.  Dose: 1,000 mg     Apriso 0.375 GM Cp24  Generic drug: Mesalamine   Take  by mouth 2 times a day. 1 cap BID  Per pt, weaning off     CALCIUM 600 HIGH POTENCY PO   Take 600 mg by mouth every day.  Dose: 600 mg     IRON PO   Take 1 Tab by mouth every day.  Dose: 1 Tablet     levothyroxine 88 MCG Tabs  Commonly known as: Synthroid   TAKE ONE TABLET BY MOUTH EVERY MORNING ON AN EMPTY STOMACH (SYNTHROID)     oxybutynin 5 MG Tabs  Commonly known as: Ditropan   TAKE ONE TABLET BY MOUTH TWICE A DAY (DITROPAN)     vedolizumab 300 MG Solr injection  Commonly known  as: Entyvio   Infuse 300 mg into a venous catheter every 4 weeks.  Dose: 300 mg     VITAMIN D PO   Take 1 Tablet by mouth every day.  Dose: 1 Tablet              Allergies  Allergies   Allergen Reactions    Sulfa Drugs Shortness of Breath    Sulfasalazine Shortness of Breath    Latex Unspecified     Bumps      Mesalamine Shortness of Breath     Given new RX 7/2019, currently taking       DIET  No orders of the defined types were placed in this encounter.      LABORATORY  Lab Results   Component Value Date    SODIUM 137 01/21/2025    POTASSIUM 3.6 01/21/2025    CHLORIDE 104 01/21/2025    CO2 23 01/21/2025    GLUCOSE 88 01/21/2025    BUN 12 01/21/2025    CREATININE 0.57 01/21/2025    GLOMRATE 74 04/10/2023        Lab Results   Component Value Date    WBC 6.3 01/21/2025    HEMOGLOBIN 10.4 (L) 01/21/2025    HEMATOCRIT 32.3 (L) 01/21/2025    PLATELETCT 341 01/21/2025        Total time of the discharge process exceeds 37 minutes.

## 2025-01-31 ENCOUNTER — OFFICE VISIT (OUTPATIENT)
Dept: SURGERY | Facility: MEDICAL CENTER | Age: 60
End: 2025-01-31
Attending: SURGERY
Payer: COMMERCIAL

## 2025-01-31 VITALS
TEMPERATURE: 98.6 F | HEIGHT: 62 IN | SYSTOLIC BLOOD PRESSURE: 106 MMHG | OXYGEN SATURATION: 98 % | WEIGHT: 125.4 LBS | DIASTOLIC BLOOD PRESSURE: 72 MMHG | HEART RATE: 72 BPM | BODY MASS INDEX: 23.08 KG/M2

## 2025-01-31 DIAGNOSIS — Z90.49 STATUS POST LAPAROSCOPIC APPENDECTOMY: ICD-10-CM

## 2025-01-31 DIAGNOSIS — R00.1 BRADYCARDIA: ICD-10-CM

## 2025-01-31 DIAGNOSIS — I49.9 CARDIAC ARRHYTHMIA, UNSPECIFIED CARDIAC ARRHYTHMIA TYPE: ICD-10-CM

## 2025-01-31 ASSESSMENT — ENCOUNTER SYMPTOMS
FEVER: 0
VOMITING: 0
CHILLS: 0
ABDOMINAL PAIN: 1
PALPITATIONS: 0
NAUSEA: 0

## 2025-01-31 ASSESSMENT — FIBROSIS 4 INDEX: FIB4 SCORE: 1.24

## 2025-01-31 NOTE — PROGRESS NOTES
Subjective     Mira Ramirez is a 59 y.o. female who presents with Post-op (ACS 1/16/25/[place]/lap appendectomy /)            This is a pleasant 59-year-old patient here today for follow-up after laparoscopic appendectomy 1/16/2025.  Since the patient was discharged from the hospital she has been tolerating a regular diet and her bowel movements have been regular. She was given a one week course of antibiotics that she took over the last 14 days at night time for her belly pain. She has had no fever or chills. She sees GI regularly for Crohn's disease. Her last colonoscopy was one week prior to her surgery.        Review of Systems   Constitutional:  Negative for chills and fever.   Cardiovascular:  Negative for palpitations.   Gastrointestinal:  Positive for abdominal pain. Negative for nausea and vomiting.              Objective         Physical Exam  Vitals and nursing note reviewed.   Constitutional:       Appearance: Normal appearance. She is not ill-appearing or toxic-appearing.   Abdominal:      Tenderness: There is abdominal tenderness in the right lower quadrant.      Comments: Three laparoscopic incisions CDI   Neurological:      Mental Status: She is alert.   Psychiatric:         Behavior: Behavior is cooperative.            Assessment & Plan        Assessment & Plan  Status post laparoscopic appendectomy  Satisfactory progress after laparoscopic appendectomy. She will take tylenol for pain going forward. She will follow up here in clinic as needed.    Bradycardia  - Cardiology referral  Cardiac arrhythmia, unspecified cardiac arrhythmia type  - Cardiology referral    Pathology showed acute suppurative and necrotizing appendicitis with histologic features suggestive of perforation. This was discussed with the patient.

## 2025-01-31 NOTE — DISCHARGE INSTRUCTIONS
Laparoscopic Appendectomy, Adult, Care After  What can I expect after the procedure?  After a laparoscopic appendectomy, it is common to have:  Little energy for normal activities.  Mild pain in the area where the cuts from surgery (incisions) were made.  Trouble pooping (constipation). This may be caused by:  Pain medicine.  A lack of activity.  Gas pain that can spread to your shoulders.  Follow these instructions at home:  Your doctor may give you more instructions. If you have problems, contact your doctor.  Medicines  Take over-the-counter and prescription medicines only as told by your doctor.  Do not stop taking antibiotics even if you start to feel better.  Take pain medicines before your pain gets very bad.  If told, take steps to prevent problems with pooping (constipation). You may need to:  Drink enough fluid to keep your pee (urine) pale yellow.  Take medicines. You will be told what medicines to take.  Eat foods that are high in fiber. These include beans, whole grains, and fresh fruits and vegetables.  Limit foods that are high in fat and sugar. These include fried or sweet foods.  Ask your doctor if you should avoid driving or using machines while you are taking your medicine.  Incision care    Follow instructions from your doctor about how to take care of your incisions. Make sure you:  Wash your hands with soap and water for at least 20 seconds before and after you change your bandage. If you cannot use soap and water, use hand .  Change your bandage.  Leave stitches, staples, or skin glue in place for at least 2 weeks.  Leave tape strips alone unless you are told to take them off. You may trim the edges of the tape strips if they curl up.  Check your incisions every day for signs of infection. Check for:  Redness, swelling, or pain.  Fluid or blood.  Warmth.  Pus or a bad smell.  Bathing  Do not take baths, swim, or use a hot tub. Ask your doctor about taking showers or sponge  Pt called clinic requesting a new referral to a Ophthalmologist. States the referral to the eye clinic has been rescheduled to late June 2025. Pt also c/o leg pain , requesting pain meds. Inform patient that a message will be sent to PCP regarding her request. Informed patient it will be best for her to place call to insurance to assist with finding a doctor .      baths.  Keep your incisions clean and dry. Clean them as told by your doctor. To do this:  Gently wash the incisions with soap and water.  Rinse the incisions with water to get all the soap off.  Pat the incisions dry with a clean towel. Do not rub the incisions.  Activity    If you were given a sedative during your procedure, do not drive or use machines until your doctor says that it is safe. A sedative is a medicine that helps you relax.  Rest as told by your doctor.  Do not lift anything that is heavier than 10 lb (4.5 kg).  Do not play contact sports until your doctor says that it is safe.  Return to your normal activities when your doctor says that it is safe.  General instructions  If you were sent home with a drain, follow instructions from your doctor on how to care for it.  Take deep breaths. This helps to keep your lungs from getting an infection (pneumonia).  If you need to cough or sneeze, place a pillow or blanket on your belly before you do so. This will help you control the pain.  Keep all follow-up visits.  Contact a doctor if:  You have any of these signs of infection in an incision:  Redness, swelling, or pain.  Fluid or blood.  Warmth.  Pus or a bad smell.  Your incisions open after the stitches have been taken out.  You lose your appetite.  You feel as if you may vomit.  You vomit.  You get watery poop (diarrhea), or you cannot control your poop.  You get a rash.  Get help right away if:  You have a fever.  You have trouble breathing.  You have sharp pains in your chest.  You have swelling or pain in your legs.  You faint.  These symptoms may be an emergency. Get help right away. Call 911.  Do not wait to see if the symptoms will go away.  Do not drive yourself to the hospital.  Summary  After the procedure, it is common to have low energy, mild pain, trouble pooping, and gas pain.  Follow your doctor's instructions about caring for yourself after the procedure.  Rest after the procedure. Return  to your normal activities as told by your doctor.  Contact your doctor if you see signs of infection around your incisions. Get help right away if you have a fever, chest pain, or trouble breathing.  This information is not intended to replace advice given to you by your health care provider. Make sure you discuss any questions you have with your health care provider.  Document Revised: 09/29/2022 Document Reviewed: 09/29/2022  [employer] Patient Education © 2023 [employer]

## 2025-02-05 ENCOUNTER — TELEPHONE (OUTPATIENT)
Dept: SURGERY | Facility: MEDICAL CENTER | Age: 60
End: 2025-02-05
Payer: COMMERCIAL

## 2025-02-05 ENCOUNTER — APPOINTMENT (OUTPATIENT)
Dept: RADIOLOGY | Facility: MEDICAL CENTER | Age: 60
End: 2025-02-05
Attending: EMERGENCY MEDICINE
Payer: COMMERCIAL

## 2025-02-05 ENCOUNTER — HOSPITAL ENCOUNTER (EMERGENCY)
Facility: MEDICAL CENTER | Age: 60
End: 2025-02-05
Attending: EMERGENCY MEDICINE
Payer: COMMERCIAL

## 2025-02-05 VITALS
SYSTOLIC BLOOD PRESSURE: 124 MMHG | WEIGHT: 125.22 LBS | BODY MASS INDEX: 23.04 KG/M2 | HEIGHT: 62 IN | TEMPERATURE: 97.9 F | RESPIRATION RATE: 16 BRPM | OXYGEN SATURATION: 94 % | HEART RATE: 55 BPM | DIASTOLIC BLOOD PRESSURE: 83 MMHG

## 2025-02-05 DIAGNOSIS — R10.30 LOWER ABDOMINAL PAIN: ICD-10-CM

## 2025-02-05 LAB
ALBUMIN SERPL BCP-MCNC: 4.2 G/DL (ref 3.2–4.9)
ALBUMIN/GLOB SERPL: 1.1 G/DL
ALP SERPL-CCNC: 89 U/L (ref 30–99)
ALT SERPL-CCNC: 13 U/L (ref 2–50)
ANION GAP SERPL CALC-SCNC: 11 MMOL/L (ref 7–16)
APPEARANCE UR: CLEAR
AST SERPL-CCNC: 19 U/L (ref 12–45)
BACTERIA #/AREA URNS HPF: ABNORMAL /HPF
BASOPHILS # BLD AUTO: 0.8 % (ref 0–1.8)
BASOPHILS # BLD: 0.06 K/UL (ref 0–0.12)
BILIRUB SERPL-MCNC: 0.7 MG/DL (ref 0.1–1.5)
BILIRUB UR QL STRIP.AUTO: NEGATIVE
BUN SERPL-MCNC: 14 MG/DL (ref 8–22)
CALCIUM ALBUM COR SERPL-MCNC: 9.8 MG/DL (ref 8.5–10.5)
CALCIUM SERPL-MCNC: 10 MG/DL (ref 8.5–10.5)
CASTS URNS QL MICRO: ABNORMAL /LPF (ref 0–2)
CHLORIDE SERPL-SCNC: 105 MMOL/L (ref 96–112)
CO2 SERPL-SCNC: 23 MMOL/L (ref 20–33)
COLOR UR: YELLOW
CREAT SERPL-MCNC: 0.78 MG/DL (ref 0.5–1.4)
EKG IMPRESSION: NORMAL
EOSINOPHIL # BLD AUTO: 0.1 K/UL (ref 0–0.51)
EOSINOPHIL NFR BLD: 1.3 % (ref 0–6.9)
EPITHELIAL CELLS 1715: ABNORMAL /HPF (ref 0–5)
ERYTHROCYTE [DISTWIDTH] IN BLOOD BY AUTOMATED COUNT: 47.7 FL (ref 35.9–50)
GFR SERPLBLD CREATININE-BSD FMLA CKD-EPI: 86 ML/MIN/1.73 M 2
GLOBULIN SER CALC-MCNC: 4 G/DL (ref 1.9–3.5)
GLUCOSE SERPL-MCNC: 105 MG/DL (ref 65–99)
GLUCOSE UR STRIP.AUTO-MCNC: NEGATIVE MG/DL
HCT VFR BLD AUTO: 37.7 % (ref 37–47)
HGB BLD-MCNC: 11.9 G/DL (ref 12–16)
HYALINE CAST   1831: PRESENT /LPF
IMM GRANULOCYTES # BLD AUTO: 0.01 K/UL (ref 0–0.11)
IMM GRANULOCYTES NFR BLD AUTO: 0.1 % (ref 0–0.9)
KETONES UR STRIP.AUTO-MCNC: ABNORMAL MG/DL
LEUKOCYTE ESTERASE UR QL STRIP.AUTO: ABNORMAL
LIPASE SERPL-CCNC: 47 U/L (ref 11–82)
LYMPHOCYTES # BLD AUTO: 2.45 K/UL (ref 1–4.8)
LYMPHOCYTES NFR BLD: 31.6 % (ref 22–41)
MCH RBC QN AUTO: 28.7 PG (ref 27–33)
MCHC RBC AUTO-ENTMCNC: 31.6 G/DL (ref 32.2–35.5)
MCV RBC AUTO: 91.1 FL (ref 81.4–97.8)
MICRO URNS: ABNORMAL
MONOCYTES # BLD AUTO: 0.49 K/UL (ref 0–0.85)
MONOCYTES NFR BLD AUTO: 6.3 % (ref 0–13.4)
NEUTROPHILS # BLD AUTO: 4.64 K/UL (ref 1.82–7.42)
NEUTROPHILS NFR BLD: 59.9 % (ref 44–72)
NITRITE UR QL STRIP.AUTO: NEGATIVE
NRBC # BLD AUTO: 0 K/UL
NRBC BLD-RTO: 0 /100 WBC (ref 0–0.2)
PH UR STRIP.AUTO: 6 [PH] (ref 5–8)
PLATELET # BLD AUTO: 594 K/UL (ref 164–446)
PMV BLD AUTO: 8.7 FL (ref 9–12.9)
POTASSIUM SERPL-SCNC: 4.6 MMOL/L (ref 3.6–5.5)
PROT SERPL-MCNC: 8.2 G/DL (ref 6–8.2)
PROT UR QL STRIP: NEGATIVE MG/DL
RBC # BLD AUTO: 4.14 M/UL (ref 4.2–5.4)
RBC # URNS HPF: ABNORMAL /HPF (ref 0–2)
RBC UR QL AUTO: NEGATIVE
SODIUM SERPL-SCNC: 139 MMOL/L (ref 135–145)
SP GR UR STRIP.AUTO: 1.02
UROBILINOGEN UR STRIP.AUTO-MCNC: 0.2 EU/DL
WBC # BLD AUTO: 7.8 K/UL (ref 4.8–10.8)
WBC #/AREA URNS HPF: ABNORMAL /HPF

## 2025-02-05 PROCEDURE — 36415 COLL VENOUS BLD VENIPUNCTURE: CPT

## 2025-02-05 PROCEDURE — 96374 THER/PROPH/DIAG INJ IV PUSH: CPT

## 2025-02-05 PROCEDURE — 85025 COMPLETE CBC W/AUTO DIFF WBC: CPT

## 2025-02-05 PROCEDURE — 93005 ELECTROCARDIOGRAM TRACING: CPT | Mod: TC | Performed by: EMERGENCY MEDICINE

## 2025-02-05 PROCEDURE — 700117 HCHG RX CONTRAST REV CODE 255: Performed by: EMERGENCY MEDICINE

## 2025-02-05 PROCEDURE — 80053 COMPREHEN METABOLIC PANEL: CPT

## 2025-02-05 PROCEDURE — 99285 EMERGENCY DEPT VISIT HI MDM: CPT

## 2025-02-05 PROCEDURE — 83690 ASSAY OF LIPASE: CPT

## 2025-02-05 PROCEDURE — 74177 CT ABD & PELVIS W/CONTRAST: CPT

## 2025-02-05 PROCEDURE — 700111 HCHG RX REV CODE 636 W/ 250 OVERRIDE (IP): Performed by: EMERGENCY MEDICINE

## 2025-02-05 PROCEDURE — 96375 TX/PRO/DX INJ NEW DRUG ADDON: CPT

## 2025-02-05 PROCEDURE — 81001 URINALYSIS AUTO W/SCOPE: CPT

## 2025-02-05 RX ORDER — MORPHINE SULFATE 15 MG/1
7.5 TABLET ORAL EVERY 6 HOURS PRN
Qty: 6 TABLET | Refills: 0 | Status: SHIPPED | OUTPATIENT
Start: 2025-02-05 | End: 2025-02-08

## 2025-02-05 RX ORDER — ONDANSETRON 2 MG/ML
4 INJECTION INTRAMUSCULAR; INTRAVENOUS ONCE
Status: COMPLETED | OUTPATIENT
Start: 2025-02-05 | End: 2025-02-05

## 2025-02-05 RX ORDER — KETOROLAC TROMETHAMINE 15 MG/ML
15 INJECTION, SOLUTION INTRAMUSCULAR; INTRAVENOUS ONCE
Status: COMPLETED | OUTPATIENT
Start: 2025-02-05 | End: 2025-02-05

## 2025-02-05 RX ORDER — CYCLOBENZAPRINE HCL 5 MG
5-10 TABLET ORAL 3 TIMES DAILY PRN
Qty: 30 TABLET | Refills: 0 | Status: SHIPPED | OUTPATIENT
Start: 2025-02-05

## 2025-02-05 RX ORDER — MORPHINE SULFATE 4 MG/ML
2 INJECTION INTRAVENOUS
Status: ACTIVE | OUTPATIENT
Start: 2025-02-05 | End: 2025-02-05

## 2025-02-05 RX ADMIN — IOHEXOL 100 ML: 350 INJECTION, SOLUTION INTRAVENOUS at 10:45

## 2025-02-05 RX ADMIN — ONDANSETRON 4 MG: 2 INJECTION INTRAMUSCULAR; INTRAVENOUS at 14:33

## 2025-02-05 RX ADMIN — KETOROLAC TROMETHAMINE 15 MG: 15 INJECTION, SOLUTION INTRAMUSCULAR; INTRAVENOUS at 14:33

## 2025-02-05 ASSESSMENT — PAIN DESCRIPTION - PAIN TYPE: TYPE: ACUTE PAIN

## 2025-02-05 ASSESSMENT — FIBROSIS 4 INDEX: FIB4 SCORE: 1.24

## 2025-02-05 NOTE — TELEPHONE ENCOUNTER
"Patient called stating that she is in \"severe pain from her surgery and can barely get out of bed and can't use the restroom and feels like something is very wrong\". I paged the on call [facility] provider and she recommended the patient to go to the ER if the patient is in that much pain. I told the patient and she refused to go to the ER and wanted to be seen again at another post op visit on Friday. I explained to her that we can't do anything in the office for the pain or run any test on her to see what is wrong at that appointment and that she would have to get that done at the ER. Patient understood and is scheduled for Friday. No further questions.   "

## 2025-02-05 NOTE — TELEPHONE ENCOUNTER
Telephoned patient at the request of RN in clinic.  The patient has had increased abdominal pain, painful bowel movements, anorexia and fever and chills.  Have advised the patient to go directly to the emergency room for evaluation rather than wait 2 more days for a clinic appointment.  She is in agreement.

## 2025-02-05 NOTE — ED NOTES
Chief Complaint   Patient presents with    Abdominal Pain     Ambulated in triage report rlq abdominal pain x 1 week. Denies n/v/d.     Painful Urination     X 1 week. Denies blood in urine.     S/p lap appendectomy on 1/16  Educated on triage process. Instructed to notify staff for any worsening symptoms. Denies any recent travel. Denies exposure to known covid positive patients. Denies any respiratory symptoms.

## 2025-02-05 NOTE — ED PROVIDER NOTES
ER Provider Note    Scribed for Brandon Rinaldi M.D. by Carmelita Ramirez. 2/5/2025   2:03 PM    Primary Care Provider: Chava Senior M.D.    CHIEF COMPLAINT  Chief Complaint   Patient presents with    Abdominal Pain     Ambulated in triage report rlq abdominal pain x 1 week. Denies n/v/d.     Painful Urination     X 1 week. Denies blood in urine.     EXTERNAL RECORDS REVIEWED  Inpatient Notes Reviewed medical records which show history of ulcerative colitis. The patient was admitted for appendicitis and had septic shock. She had a temporary pacemaker placed and was admitted to the ICU.       HPI/ROS  LIMITATION TO HISTORY   Select: : None  OUTSIDE HISTORIAN(S):  None    Mira Ramirez is a 59 y.o. female who presents to the ED for evaluation of abdominal pain onset 5 days ago. The patient reports that she had her appendix removed 1/16/25. She states that her abdominal pain returned on 1/31/25. She describes her pain as being located around the surgical site and feeling similar to before she had her appendix removed. She has associated nausea, feet pain, and difficulty walking. She explains that she was seen in the clinic on 1/31/25 and was told her pain was apart of the recovery process. She also reports that she started to have painful urination 1 week ago. She has been taking Tylenol for her pain with no alleviation. She denies having any fever, vomiting, diarrhea, or blood in her urine. No alleviating or exacerbating factors were noted.    PAST MEDICAL HISTORY  Past Medical History:   Diagnosis Date    Bleeding from the nose     Blood transfusion without reported diagnosis     Hay fever     Hives     Measles     Mumps        SURGICAL HISTORY  Past Surgical History:   Procedure Laterality Date    CO LAP,APPENDECTOMY N/A 1/16/2025    Procedure: APPENDECTOMY, LAPAROSCOPIC;  Surgeon: Brandon Contreras M.D.;  Location: SURGERY [facility];  Service: Trauma    EYE SURGERY         FAMILY HISTORY  Family  "History   Problem Relation Age of Onset    Arthritis Neg Hx     Lung Disease Neg Hx     Genetic Disorder Neg Hx     Cancer Neg Hx     Psychiatric Illness Neg Hx     Diabetes Neg Hx     Heart Disease Neg Hx     Hypertension Neg Hx     Hyperlipidemia Neg Hx     Stroke Neg Hx     Alcohol/Drug Neg Hx        SOCIAL HISTORY   reports that she has never smoked. She has never used smokeless tobacco. She reports current alcohol use. She reports that she does not use drugs.    CURRENT MEDICATIONS  Current Outpatient Medications   Medication Instructions    acetaminophen (TYLENOL) 1,000 mg, Oral, 3 TIMES DAILY PRN    Calcium Carbonate (CALCIUM 600 HIGH POTENCY PO) 600 mg, Oral, DAILY    IRON PO 1 Tablet, Oral, DAILY    levothyroxine (SYNTHROID) 88 MCG Tab TAKE ONE TABLET BY MOUTH EVERY MORNING ON AN EMPTY STOMACH (SYNTHROID)    Mesalamine (APRISO) 0.375 GM CAPSULE SR 24 HR Oral, 2 Times Daily (BID), 1 cap BID  Per pt, weaning off    ondansetron (ZOFRAN ODT) 4 MG TABLET DISPERSIBLE Dissolve 1 Tablet by mouth every four hours as needed for Nausea/Vomiting    oxybutynin (DITROPAN) 5 MG Tab TAKE ONE TABLET BY MOUTH TWICE A DAY (DITROPAN)    vedolizumab (ENTYVIO) 300 mg, Intravenous, Every 4 Weeks    VITAMIN D PO 1 Tablet, Oral, DAILY      ALLERGIES  Allergies   Allergen Reactions    Sulfa Drugs Shortness of Breath    Sulfasalazine Shortness of Breath    Latex Unspecified     Bumps      Mesalamine Shortness of Breath     Given new RX 7/2019, currently taking        PHYSICAL EXAM  /73   Pulse 86   Temp 36.1 °C (97 °F) (Temporal)   Resp 16   Ht 1.575 m (5' 2\")   Wt 56.8 kg (125 lb 3.5 oz)   LMP  (LMP Unknown)   SpO2 95%   BMI 22.90 kg/m²      Constitutional: Well developed, Well nourished, Moderate distress.   HENT: Normocephalic, Atraumatic   Eyes: PERRLA, EOMI, Conjunctiva normal, No discharge.   Neck: No tenderness, Supple, No stridor.   Cardiovascular: Normal heart rate, Normal rhythm.   Thorax & Lungs: Clear to " auscultation bilaterally, No respiratory distress.   Abdomen: Tenderness across the lower abdomen. Soft, No masses.   Skin: Warm, Dry, No rash.    Musculoskeletal: No major deformities noted.  Neurologic: Awake, alert. Moves all extremities spontaneously.  Psychiatric: Odd affect, Judgment normal, Mood normal.     DIAGNOSTIC STUDIES    Labs:   Results for orders placed or performed during the hospital encounter of 02/05/25   CBC with Differential    Collection Time: 02/05/25  1:36 PM   Result Value Ref Range    WBC 7.8 4.8 - 10.8 K/uL    RBC 4.14 (L) 4.20 - 5.40 M/uL    Hemoglobin 11.9 (L) 12.0 - 16.0 g/dL    Hematocrit 37.7 37.0 - 47.0 %    MCV 91.1 81.4 - 97.8 fL    MCH 28.7 27.0 - 33.0 pg    MCHC 31.6 (L) 32.2 - 35.5 g/dL    RDW 47.7 35.9 - 50.0 fL    Platelet Count 594 (H) 164 - 446 K/uL    MPV 8.7 (L) 9.0 - 12.9 fL    Neutrophils-Polys 59.90 44.00 - 72.00 %    Lymphocytes 31.60 22.00 - 41.00 %    Monocytes 6.30 0.00 - 13.40 %    Eosinophils 1.30 0.00 - 6.90 %    Basophils 0.80 0.00 - 1.80 %    Immature Granulocytes 0.10 0.00 - 0.90 %    Nucleated RBC 0.00 0.00 - 0.20 /100 WBC    Neutrophils (Absolute) 4.64 1.82 - 7.42 K/uL    Lymphs (Absolute) 2.45 1.00 - 4.80 K/uL    Monos (Absolute) 0.49 0.00 - 0.85 K/uL    Eos (Absolute) 0.10 0.00 - 0.51 K/uL    Baso (Absolute) 0.06 0.00 - 0.12 K/uL    Immature Granulocytes (abs) 0.01 0.00 - 0.11 K/uL    NRBC (Absolute) 0.00 K/uL   Complete Metabolic Panel    Collection Time: 02/05/25  1:36 PM   Result Value Ref Range    Sodium 139 135 - 145 mmol/L    Potassium 4.6 3.6 - 5.5 mmol/L    Chloride 105 96 - 112 mmol/L    Co2 23 20 - 33 mmol/L    Anion Gap 11.0 7.0 - 16.0    Glucose 105 (H) 65 - 99 mg/dL    Bun 14 8 - 22 mg/dL    Creatinine 0.78 0.50 - 1.40 mg/dL    Calcium 10.0 8.5 - 10.5 mg/dL    Correct Calcium 9.8 8.5 - 10.5 mg/dL    AST(SGOT) 19 12 - 45 U/L    ALT(SGPT) 13 2 - 50 U/L    Alkaline Phosphatase 89 30 - 99 U/L    Total Bilirubin 0.7 0.1 - 1.5 mg/dL    Albumin 4.2  3.2 - 4.9 g/dL    Total Protein 8.2 6.0 - 8.2 g/dL    Globulin 4.0 (H) 1.9 - 3.5 g/dL    A-G Ratio 1.1 g/dL   Lipase    Collection Time: 25  1:36 PM   Result Value Ref Range    Lipase 47 11 - 82 U/L   ESTIMATED GFR    Collection Time: 25  1:36 PM   Result Value Ref Range    GFR (CKD-EPI) 86 >60 mL/min/1.73 m 2   Urinalysis    Collection Time: 25  2:00 PM    Specimen: Blood   Result Value Ref Range    Color Yellow     Character Clear     Specific Gravity 1.019 <1.035    Ph 6.0 5.0 - 8.0    Glucose Negative Negative mg/dL    Ketones Trace (A) Negative mg/dL    Protein Negative Negative mg/dL    Bilirubin Negative Negative    Urobilinogen, Urine 0.2 <=1.0 EU/dL    Nitrite Negative Negative    Leukocyte Esterase Small (A) Negative    Occult Blood Negative Negative    Micro Urine Req Microscopic    URINE MICROSCOPIC (W/UA)    Collection Time: 25  2:00 PM   Result Value Ref Range    WBC 6-10 (A) /hpf    RBC 3-5 (A) 0 - 2 /hpf    Bacteria None Seen None /hpf    Epithelial Cells 3-5 0 - 5 /hpf    Urine Casts 0-2 0 - 2 /lpf    Hyaline Cast Present /lpf   EKG (NOW)    Collection Time: 25  5:55 PM   Result Value Ref Range    Report       [facility]    Test Date:  2025  Pt Name:    BUBBA MULLINS             Department: ER  MRN:        [id number]                      Room:       [facility]  Gender:     Female                       Technician: [id number]  :        1965                   Requested By:JOSEMANUEL MAYORGA  Order #:    [id number]                    Reading MD: JOSEMANUEL MAYORGA MD    Measurements  Intervals                                Axis  Rate:       58                           P:          0  NV:         219                          QRS:        2  QRSD:       102                          T:          24  QT:         396  QTc:        389    Interpretive Statements  Sinus bradycardia  Borderline prolonged NV interval  Low voltage, precordial  leads  ST elevation, consider lateral injury  Baseline wander in lead(s) I,II,III,aVL,aVF,V1,V2,V3,V5,V6  Compared to ECG 01/17/2025 00:39:13  ST (T wave) deviation now present  Myocardial infarct finding now present  Junctional  rhythm no longer present  Electronically Signed On 02- 17:55:55 PST by JOSEMANUEL MAYORGA MD         Radiology:   This attending emergency physician has independently interpreted the diagnostic imaging associated with this visit and is awaiting the final reading from the radiologist.   Preliminary interpretation is a follows: No masses or free fluid  Radiologist interpretation:   CT-ABDOMEN-PELVIS WITH   Final Result      1.  Unremarkable CT scan status post appendectomy.             COURSE & MEDICAL DECISION MAKING     INITIAL ASSESSMENT, COURSE AND PLAN  Differential diagnoses include but not limited to: Abdominal wall pain, abscess, sepsis.      Care Narrative: Patient is coming in secondary to lower abdominal pain.  She states been going on for last several weeks.  She is run out of her medicine, she continues to have pain, she followed up with her surgeon who reassured her.  CT scan was obtained, laboratory tests are unremarkable, CT scan does not show any acute pathology.  I believe it is likely abdominal wall pain.  The patient will get a prescription for Flexeril, pain medicines, have the patient follow-up with her primary care physician, have the patient return with worsening symptoms.    2:03 PM - Patient was evaluated at bedside. Ordered for CT-Abdomen-Pelvis, CBC with diff, CMP, Lipase, UA, Urine Micro, and EKG to evaluate. The patient will be medicated with 4 mg morphine, 4 mg Zofran, and 15 mg Toradol for her symptoms. Patient verbalizes understanding and support with my plan of care.       DISPOSITION AND DISCUSSIONS  Escalation of care considered, and ultimately not performed: acute inpatient care management, however at this time, the patient is most appropriate for  outpatient management.    Barriers to care at this time, including but not limited to:  None .     Decision tools and prescription drugs considered including, but not limited to: Pain Medications   .    I reviewed prescription monitoring program for patient's narcotic use before prescribing a scheduled drug.The patient will not drink alcohol nor drive with prescribed medications.} The patient will return for new or worsening symptoms and is stable at the time of discharge.    The patient is referred to a primary physician for blood pressure management, diabetic screening, and for all other preventative health concerns.    In prescribing controlled substances to this patient, I certify that I have obtained and reviewed the medical history of Mira Ramirez. I have also made a good cindy effort to obtain applicable records from other providers who have treated the patient and records did not demonstrate any increased risk of substance abuse that would prevent me from prescribing controlled substances.     I have conducted a physical exam and documented it. I have reviewed Ms. Aden Ramirez’s prescription history as maintained by the [place] Prescription Monitoring Program.     I have assessed the patient’s risk for abuse, dependency, and addiction using the validated Opioid Risk Tool available at https://www.mdcalc.com/xcounz-xqgt-tggj-ort-narcotic-abuse.     Given the above, I believe the benefits of controlled substance therapy outweigh the risks. The reasons for prescribing controlled substances include non-narcotic, oral analgesic alternatives have been inadequate for pain control. Accordingly, I have discussed the risk and benefits, treatment plan, and alternative therapies with the patient.       DISPOSITION:  Patient will be discharged home in stable condition.    FOLLOW UP:  [facility], Emergency Dept  [address] [address] [phone]    If symptoms  [facility]    Chava Senior M.D.  [address] [address]  [phone]            OUTPATIENT MEDICATIONS:  New Prescriptions    CYCLOBENZAPRINE (FLEXERIL) 5 MG TABLET    Take 1-2 Tablets by mouth 3 times a day as needed for Moderate Pain or Mild Pain.    MORPHINE (MS IR) 15 MG TABLET    Take 0.5 Tablets by mouth every 6 hours as needed for Severe Pain for up to 3 days.         FINAL DIAGNOSIS  1. Lower abdominal pain         ICarmelita (Bernardibkarlos), am scribing for, and in the presence of, Brandon Rinaldi M.D..    Electronically signed by: Carmelita Ramirez (Bernardibkarlos), 2/5/2025    IBrandon M.D. personally performed the services described in this documentation, as scribed by Carmelita Ramirez in my presence, and it is both accurate and complete.      The note accurately reflects work and decisions made by me.  Brandon Rinaldi M.D.  2/5/2025  6:00 PM

## 2025-02-06 ENCOUNTER — TELEPHONE (OUTPATIENT)
Dept: HEALTH INFORMATION MANAGEMENT | Facility: OTHER | Age: 60
End: 2025-02-06
Payer: COMMERCIAL

## 2025-02-07 ENCOUNTER — APPOINTMENT (OUTPATIENT)
Dept: SURGERY | Facility: MEDICAL CENTER | Age: 60
End: 2025-02-07
Attending: STUDENT IN AN ORGANIZED HEALTH CARE EDUCATION/TRAINING PROGRAM
Payer: COMMERCIAL

## 2025-02-07 NOTE — PROGRESS NOTES
HISTORY OF PRESENT ILLNESS: The patient is a 59 year-old woman who returns to the [facility] for routine follow-up after undergoing a laparoscopic appendectomy for acute appendicitis on 1/16/2025 by Brandon Contreras MD.    Patient when to the ED on 2/5/2025 for increased abdominal pain, painful bowel movements, anorexia, fever and chills. CT abdomen/pelvis unremarkable.     CURRENT MEDICATIONS:  Current Outpatient Medications   Medication Sig    cyclobenzaprine (FLEXERIL) 5 mg tablet Take 1-2 Tablets by mouth 3 times a day as needed for Moderate Pain or Mild Pain.    morphine (MS IR) 15 MG tablet Take 0.5 Tablets by mouth every 6 hours as needed for Severe Pain for up to 3 days.    ondansetron (ZOFRAN ODT) 4 MG TABLET DISPERSIBLE Dissolve 1 Tablet by mouth every four hours as needed for Nausea/Vomiting    vedolizumab (ENTYVIO) 300 MG Recon Soln injection Infuse 300 mg into a venous catheter every 4 weeks.    VITAMIN D PO Take 1 Tablet by mouth every day.    acetaminophen (TYLENOL) 500 MG Tab Take 1,000 mg by mouth 3 times a day as needed for Mild Pain.    Calcium Carbonate (CALCIUM 600 HIGH POTENCY PO) Take 600 mg by mouth every day.    oxybutynin (DITROPAN) 5 MG Tab TAKE ONE TABLET BY MOUTH TWICE A DAY (DITROPAN)    levothyroxine (SYNTHROID) 88 MCG Tab TAKE ONE TABLET BY MOUTH EVERY MORNING ON AN EMPTY STOMACH (SYNTHROID) (Patient taking differently: Take 88 mcg by mouth every morning on an empty stomach.)    Mesalamine (APRISO) 0.375 GM CAPSULE SR 24 HR Take  by mouth 2 times a day. 1 cap BID  Per pt, weaning off    IRON PO Take 1 Tab by mouth every day.       PHYSICAL EXAMINATION:  Vital Signs: There were no vitals taken for this visit.  Physical Exam    LABORATORY VALUES:  Lab Results   Component Value Date/Time    WBC 7.8 02/05/2025 01:36 PM    RBC 4.14 (L) 02/05/2025 01:36 PM    HEMOGLOBIN 11.9 (L) 02/05/2025 01:36 PM    HEMATOCRIT 37.7 02/05/2025 01:36 PM    MCV 91.1  02/05/2025 01:36 PM    MCH 28.7 02/05/2025 01:36 PM    MCHC 31.6 (L) 02/05/2025 01:36 PM    MPV 8.7 (L) 02/05/2025 01:36 PM    NEUTSPOLYS 59.90 02/05/2025 01:36 PM    LYMPHOCYTES 31.60 02/05/2025 01:36 PM    MONOCYTES 6.30 02/05/2025 01:36 PM    EOSINOPHILS 1.30 02/05/2025 01:36 PM    BASOPHILS 0.80 02/05/2025 01:36 PM     Lab Results   Component Value Date/Time    SODIUM 139 02/05/2025 01:36 PM    POTASSIUM 4.6 02/05/2025 01:36 PM    CHLORIDE 105 02/05/2025 01:36 PM    CO2 23 02/05/2025 01:36 PM    GLUCOSE 105 (H) 02/05/2025 01:36 PM    BUN 14 02/05/2025 01:36 PM    CREATININE 0.78 02/05/2025 01:36 PM    GLOMRATE 74 04/10/2023 05:22 PM     Lab Results   Component Value Date/Time    PROTHROMBTM 14.1 08/07/2018 02:51 PM    INR 1.12 08/07/2018 02:51 PM       IMAGING:  No orders to display       PATHOLOGY: Final pathology demonstrated Pathology showed acute suppurative and necrotizing appendicitis with histologic features suggestive of perforation. This was discussed with the patient.     ASSESSMENT AND PLAN:  There are no diagnoses linked to this encounter.  {D/C Instructions:46692}       ____________________________________     VANNA Bae    DD: 2/7/2025  8:30 AM

## 2025-02-20 ENCOUNTER — TELEPHONE (OUTPATIENT)
Dept: CARDIOLOGY | Facility: MEDICAL CENTER | Age: 60
End: 2025-02-20
Payer: COMMERCIAL

## 2025-02-20 NOTE — TELEPHONE ENCOUNTER
Called patient in regards to records for NP appointment with Dr. HIDALGO To review most recent lab results, ekg, any cardiac testing or ,if patient has been treated by a cardiologist. No answer, left voicemail to call back

## 2025-02-24 NOTE — TELEPHONE ENCOUNTER
In regards to records for NP appointment with VR.     Patient has seen a cardiologist before?  No    Any recent cardiac testing outside of [facility]?  No    Were any records requested?  No

## 2025-02-24 NOTE — TELEPHONE ENCOUNTER
[facility]    Caller: Mira Ramirez     Topic/issue: No prior cardio  Labs: [facility] ED 2/5/25  EKG: [facility] 2/5/25    Callback Number: [phone]     Thank you,  Modesta SALCEDO     - - -

## 2025-03-12 ENCOUNTER — OFFICE VISIT (OUTPATIENT)
Dept: CARDIOLOGY | Facility: MEDICAL CENTER | Age: 60
End: 2025-03-12
Attending: PHYSICIAN ASSISTANT
Payer: COMMERCIAL

## 2025-03-12 VITALS
SYSTOLIC BLOOD PRESSURE: 112 MMHG | RESPIRATION RATE: 16 BRPM | DIASTOLIC BLOOD PRESSURE: 70 MMHG | BODY MASS INDEX: 23 KG/M2 | HEIGHT: 62 IN | HEART RATE: 72 BPM | OXYGEN SATURATION: 98 % | WEIGHT: 125 LBS

## 2025-03-12 DIAGNOSIS — R06.83 SNORING: ICD-10-CM

## 2025-03-12 DIAGNOSIS — R00.1 BRADYCARDIA: ICD-10-CM

## 2025-03-12 LAB — EKG IMPRESSION: NORMAL

## 2025-03-12 PROCEDURE — 99212 OFFICE O/P EST SF 10 MIN: CPT | Performed by: INTERNAL MEDICINE

## 2025-03-12 PROCEDURE — 99204 OFFICE O/P NEW MOD 45 MIN: CPT | Mod: 25 | Performed by: INTERNAL MEDICINE

## 2025-03-12 PROCEDURE — 3074F SYST BP LT 130 MM HG: CPT | Performed by: INTERNAL MEDICINE

## 2025-03-12 PROCEDURE — 93005 ELECTROCARDIOGRAM TRACING: CPT | Mod: TC | Performed by: INTERNAL MEDICINE

## 2025-03-12 PROCEDURE — 93010 ELECTROCARDIOGRAM REPORT: CPT | Performed by: INTERNAL MEDICINE

## 2025-03-12 PROCEDURE — 3078F DIAST BP <80 MM HG: CPT | Performed by: INTERNAL MEDICINE

## 2025-03-12 ASSESSMENT — ENCOUNTER SYMPTOMS
BACK PAIN: 0
NAUSEA: 0
ORTHOPNEA: 0
CONSTIPATION: 0
DEPRESSION: 0
FLANK PAIN: 0
WEIGHT LOSS: 0
BLURRED VISION: 0
COUGH: 0
PALPITATIONS: 0
ABDOMINAL PAIN: 0
ALTERED MENTAL STATUS: 0
PND: 0
SHORTNESS OF BREATH: 0
WEIGHT GAIN: 0
SYNCOPE: 0
DIARRHEA: 0
HEARTBURN: 0
VOMITING: 0
DYSPNEA ON EXERTION: 0
FEVER: 0
DECREASED APPETITE: 0
DIZZINESS: 0
NEAR-SYNCOPE: 0
CLAUDICATION: 0
IRREGULAR HEARTBEAT: 0

## 2025-03-12 ASSESSMENT — FIBROSIS 4 INDEX: FIB4 SCORE: 0.54

## 2025-03-12 NOTE — PROGRESS NOTES
Cardiology Note    Chief Complaint   Patient presents with    Bradycardia       History of Present Illness: Mira Ramirez is a 59 y.o. female PMH IBD, adjustment disorder, hypothyroidism who presents for follow up visit. Referred by Adrienne Noriega PA-C for bradycardia and arrhythmia.     Doing well. No cardiac complaints. Walks her dog about a mile without cardiovascular limitations. Heart rate responds when doing exercise. No toxic social habits. Sister with heart murmur nos. Referred due to bradycardia associated with anesthesia/procedures.     Review of Systems   Constitutional: Negative for decreased appetite, fever, malaise/fatigue, weight gain and weight loss.   HENT:  Negative for congestion and nosebleeds.    Eyes:  Negative for blurred vision.   Cardiovascular:  Negative for chest pain, claudication, dyspnea on exertion, irregular heartbeat, leg swelling, near-syncope, orthopnea, palpitations, paroxysmal nocturnal dyspnea and syncope.   Respiratory:  Negative for cough and shortness of breath.    Endocrine: Negative for cold intolerance and heat intolerance.   Skin:  Negative for rash.   Musculoskeletal:  Negative for back pain.   Gastrointestinal:  Negative for abdominal pain, constipation, diarrhea, heartburn, melena, nausea and vomiting.   Genitourinary:  Negative for dysuria, flank pain and hematuria.   Neurological:  Negative for dizziness.   Psychiatric/Behavioral:  Negative for altered mental status and depression.          Past Medical History:   Diagnosis Date    Bleeding from the nose     Blood transfusion without reported diagnosis     Hay fever     Hives     Measles     Mumps          Past Surgical History:   Procedure Laterality Date    MA LAP,APPENDECTOMY N/A 1/16/2025    Procedure: APPENDECTOMY, LAPAROSCOPIC;  Surgeon: Brandon Contreras M.D.;  Location: SURGERY [facility];  Service: Trauma    EYE SURGERY           Current Outpatient Medications   Medication Sig Dispense Refill     vedolizumab (ENTYVIO) 300 MG Recon Soln injection Infuse 300 mg into a venous catheter every 4 weeks.      VITAMIN D PO Take 1 Tablet by mouth every day.      acetaminophen (TYLENOL) 500 MG Tab Take 1,000 mg by mouth 3 times a day as needed for Mild Pain.      Calcium Carbonate (CALCIUM 600 HIGH POTENCY PO) Take 600 mg by mouth every day.      oxybutynin (DITROPAN) 5 MG Tab TAKE ONE TABLET BY MOUTH TWICE A DAY (DITROPAN) 60 Tab 4    levothyroxine (SYNTHROID) 88 MCG Tab TAKE ONE TABLET BY MOUTH EVERY MORNING ON AN EMPTY STOMACH (SYNTHROID) (Patient taking differently: Take 75 mcg by mouth every morning on an empty stomach.) 90 Tab 1    Mesalamine (APRISO) 0.375 GM CAPSULE SR 24 HR Take  by mouth 2 times a day. 1 cap BID  Per pt, weaning off      IRON PO Take 1 Tab by mouth every day.      cyclobenzaprine (FLEXERIL) 5 mg tablet Take 1-2 Tablets by mouth 3 times a day as needed for Moderate Pain or Mild Pain. (Patient not taking: Reported on 3/12/2025) 30 Tablet 0    ondansetron (ZOFRAN ODT) 4 MG TABLET DISPERSIBLE Dissolve 1 Tablet by mouth every four hours as needed for Nausea/Vomiting (Patient not taking: Reported on 3/12/2025) 10 Tablet 0     No current facility-administered medications for this visit.         Allergies   Allergen Reactions    Sulfa Drugs Shortness of Breath    Sulfasalazine Shortness of Breath    Latex Unspecified     Bumps      Mesalamine Shortness of Breath     Given new RX 7/2019, currently taking         Family History   Problem Relation Age of Onset    Arthritis Neg Hx     Lung Disease Neg Hx     Genetic Disorder Neg Hx     Cancer Neg Hx     Psychiatric Illness Neg Hx     Diabetes Neg Hx     Heart Disease Neg Hx     Hypertension Neg Hx     Hyperlipidemia Neg Hx     Stroke Neg Hx     Alcohol/Drug Neg Hx          Social History     Socioeconomic History    Marital status:      Spouse name: Not on file    Number of children: Not on file    Years of education: Not on file    Highest  "education level: Not on file   Occupational History    Not on file   Tobacco Use    Smoking status: Never    Smokeless tobacco: Never   Substance and Sexual Activity    Alcohol use: Yes     Alcohol/week: 0.0 oz     Comment: rare    Drug use: No    Sexual activity: Not Currently     Partners: Male   Other Topics Concern    Not on file   Social History Narrative    Not on file     Social Drivers of Health     Financial Resource Strain: Not on file   Food Insecurity: Not on file   Transportation Needs: Not on file   Physical Activity: Not on file   Stress: Not on file   Social Connections: Not on file   Intimate Partner Violence: Not on file   Housing Stability: Not on file         Physical Exam:  Ambulatory Vitals  /70 (BP Location: Left arm, Patient Position: Sitting, BP Cuff Size: Adult)   Pulse 72   Resp 16   Ht 1.575 m (5' 2\")   Wt 56.7 kg (125 lb)   SpO2 98%    BP Readings from Last 4 Encounters:   03/12/25 112/70   02/05/25 124/83   01/31/25 106/72   01/21/25 127/80     Weight/BMI:   Vitals:    03/12/25 0946   BP: 112/70   Weight: 56.7 kg (125 lb)   Height: 1.575 m (5' 2\")    Body mass index is 22.86 kg/m².  Wt Readings from Last 4 Encounters:   03/12/25 56.7 kg (125 lb)   02/05/25 56.8 kg (125 lb 3.5 oz)   01/31/25 56.9 kg (125 lb 6.4 oz)   01/20/25 (P) 58.5 kg (128 lb 15.5 oz)       Physical Exam  Constitutional:       General: She is not in acute distress.  HENT:      Head: Normocephalic and atraumatic.   Eyes:      Conjunctiva/sclera: Conjunctivae normal.      Pupils: Pupils are equal, round, and reactive to light.   Neck:      Vascular: No JVD.   Cardiovascular:      Rate and Rhythm: Normal rate and regular rhythm.      Heart sounds: Normal heart sounds. No murmur heard.     No friction rub. No gallop.   Pulmonary:      Effort: Pulmonary effort is normal. No respiratory distress.      Breath sounds: Normal breath sounds. No wheezing or rales.   Chest:      Chest wall: No tenderness.   Abdominal: " "     General: Bowel sounds are normal. There is no distension.      Palpations: Abdomen is soft.   Musculoskeletal:      Cervical back: Normal range of motion and neck supple.   Skin:     General: Skin is warm and dry.   Neurological:      Mental Status: She is alert and oriented to person, place, and time.   Psychiatric:         Mood and Affect: Affect normal.         Judgment: Judgment normal.         Lab Data Review:  Lab Results   Component Value Date/Time    CHOLSTRLTOT 178 09/26/2024 02:31 PM    LDL 91 09/26/2024 02:31 PM    HDL 44 09/26/2024 02:31 PM    TRIGLYCERIDE 217 (H) 09/26/2024 02:31 PM       Lab Results   Component Value Date/Time    SODIUM 139 02/05/2025 01:36 PM    POTASSIUM 4.6 02/05/2025 01:36 PM    CHLORIDE 105 02/05/2025 01:36 PM    CO2 23 02/05/2025 01:36 PM    GLUCOSE 105 (H) 02/05/2025 01:36 PM    BUN 14 02/05/2025 01:36 PM    CREATININE 0.78 02/05/2025 01:36 PM    GLOMRATE 74 04/10/2023 05:22 PM     CrCl cannot be calculated (Patient's most recent lab result is older than the maximum 7 days allowed.).  Lab Results   Component Value Date/Time    ALKPHOSPHAT 89 02/05/2025 01:36 PM    ASTSGOT 19 02/05/2025 01:36 PM    ALTSGPT 13 02/05/2025 01:36 PM    TBILIRUBIN 0.7 02/05/2025 01:36 PM      Lab Results   Component Value Date/Time    WBC 7.8 02/05/2025 01:36 PM     Lab Results   Component Value Date/Time    HBA1C 6.1 (H) 01/19/2025 03:41 AM     No components found for: \"TROP\"      Cardiac Imaging and Procedures Review:      EKG 3/12/25 interpreted by me sinus 71 bpm    Medical Decision Making:  Problem List Items Addressed This Visit       Bradycardia    Relevant Orders    EKG (Completed)    Cardiac Event Monitor    Snoring    Relevant Orders    OVERNIGHT PULSE OXIMETRY       Check event monitor document chronotropic competence to reassure for future procedures. Check OPO due to snoring as may affect sha under anesthesia.     It was my pleasure to meet with Ms. Aden Ashley.              "

## 2025-03-13 ENCOUNTER — TELEPHONE (OUTPATIENT)
Dept: CARDIOLOGY | Facility: MEDICAL CENTER | Age: 60
End: 2025-03-13
Payer: COMMERCIAL

## 2025-03-13 NOTE — TELEPHONE ENCOUNTER
OPO order was completed and faxed to:  [facility]:  Phone: [phone] Fax: [phone]    Confirmation fax has been sent to [email or web address].

## 2025-03-14 ENCOUNTER — NON-PROVIDER VISIT (OUTPATIENT)
Dept: CARDIOLOGY | Facility: MEDICAL CENTER | Age: 60
End: 2025-03-14
Attending: INTERNAL MEDICINE
Payer: COMMERCIAL

## 2025-03-14 DIAGNOSIS — R00.1 BRADYCARDIA: ICD-10-CM

## 2025-03-14 NOTE — PROGRESS NOTES
Patient enrolled in the 14 day [facility] Holter monitoring program per Yobany Ireland MD.  >Monitor shipped to patient by [employer].  >Currently pending EOS.

## 2025-04-10 ENCOUNTER — HOSPITAL ENCOUNTER (OUTPATIENT)
Facility: MEDICAL CENTER | Age: 60
End: 2025-04-10
Attending: FAMILY MEDICINE
Payer: COMMERCIAL

## 2025-04-10 LAB
T3FREE SERPL-MCNC: 2.94 PG/ML (ref 2–4.4)
T4 FREE SERPL-MCNC: 1.44 NG/DL (ref 0.93–1.7)
TSH SERPL DL<=0.005 MIU/L-ACNC: 3.6 UIU/ML (ref 0.38–5.33)

## 2025-04-10 PROCEDURE — 36415 COLL VENOUS BLD VENIPUNCTURE: CPT

## 2025-04-10 PROCEDURE — 84443 ASSAY THYROID STIM HORMONE: CPT

## 2025-04-10 PROCEDURE — 84481 FREE ASSAY (FT-3): CPT

## 2025-04-10 PROCEDURE — 84439 ASSAY OF FREE THYROXINE: CPT

## 2025-04-29 ENCOUNTER — HOSPITAL ENCOUNTER (OUTPATIENT)
Facility: MEDICAL CENTER | Age: 60
End: 2025-04-29
Attending: INTERNAL MEDICINE
Payer: COMMERCIAL

## 2025-04-29 LAB
25(OH)D3 SERPL-MCNC: 42 NG/ML (ref 30–100)
ALBUMIN SERPL BCP-MCNC: 4.4 G/DL (ref 3.2–4.9)
ALBUMIN/GLOB SERPL: 1.3 G/DL
ALP SERPL-CCNC: 95 U/L (ref 30–99)
ALT SERPL-CCNC: 24 U/L (ref 2–50)
ANION GAP SERPL CALC-SCNC: 11 MMOL/L (ref 7–16)
AST SERPL-CCNC: 17 U/L (ref 12–45)
BILIRUB SERPL-MCNC: 0.8 MG/DL (ref 0.1–1.5)
BUN SERPL-MCNC: 20 MG/DL (ref 8–22)
CALCIUM ALBUM COR SERPL-MCNC: 9.6 MG/DL (ref 8.5–10.5)
CALCIUM SERPL-MCNC: 9.9 MG/DL (ref 8.5–10.5)
CHLORIDE SERPL-SCNC: 103 MMOL/L (ref 96–112)
CO2 SERPL-SCNC: 25 MMOL/L (ref 20–33)
CREAT SERPL-MCNC: 0.71 MG/DL (ref 0.5–1.4)
CRP SERPL HS-MCNC: <0.3 MG/DL (ref 0–0.75)
ERYTHROCYTE [DISTWIDTH] IN BLOOD BY AUTOMATED COUNT: 47.2 FL (ref 35.9–50)
GFR SERPLBLD CREATININE-BSD FMLA CKD-EPI: 96 ML/MIN/1.73 M 2
GLOBULIN SER CALC-MCNC: 3.4 G/DL (ref 1.9–3.5)
GLUCOSE SERPL-MCNC: 103 MG/DL (ref 65–99)
HCT VFR BLD AUTO: 38.1 % (ref 37–47)
HGB BLD-MCNC: 12.2 G/DL (ref 12–16)
MCH RBC QN AUTO: 29.7 PG (ref 27–33)
MCHC RBC AUTO-ENTMCNC: 32 G/DL (ref 32.2–35.5)
MCV RBC AUTO: 92.7 FL (ref 81.4–97.8)
PLATELET # BLD AUTO: 388 K/UL (ref 164–446)
PMV BLD AUTO: 9.1 FL (ref 9–12.9)
POTASSIUM SERPL-SCNC: 3.9 MMOL/L (ref 3.6–5.5)
PROT SERPL-MCNC: 7.8 G/DL (ref 6–8.2)
RBC # BLD AUTO: 4.11 M/UL (ref 4.2–5.4)
SODIUM SERPL-SCNC: 139 MMOL/L (ref 135–145)
WBC # BLD AUTO: 6.8 K/UL (ref 4.8–10.8)

## 2025-04-29 PROCEDURE — 36415 COLL VENOUS BLD VENIPUNCTURE: CPT

## 2025-04-29 PROCEDURE — 85027 COMPLETE CBC AUTOMATED: CPT

## 2025-04-29 PROCEDURE — 86140 C-REACTIVE PROTEIN: CPT

## 2025-04-29 PROCEDURE — 80053 COMPREHEN METABOLIC PANEL: CPT

## 2025-04-29 PROCEDURE — 82306 VITAMIN D 25 HYDROXY: CPT

## 2025-04-30 ENCOUNTER — HOSPITAL ENCOUNTER (OUTPATIENT)
Facility: MEDICAL CENTER | Age: 60
End: 2025-04-30
Attending: INTERNAL MEDICINE
Payer: COMMERCIAL

## 2025-04-30 PROCEDURE — 83993 ASSAY FOR CALPROTECTIN FECAL: CPT

## 2025-05-03 LAB — CALPROTECTIN STL-MCNT: 83 UG/G

## 2025-05-14 ENCOUNTER — TELEPHONE (OUTPATIENT)
Dept: CARDIOLOGY | Facility: MEDICAL CENTER | Age: 60
End: 2025-05-14
Payer: COMMERCIAL

## 2025-05-15 ENCOUNTER — RESULTS FOLLOW-UP (OUTPATIENT)
Dept: CARDIOLOGY | Facility: MEDICAL CENTER | Age: 60
End: 2025-05-15
Payer: COMMERCIAL

## (undated) DEVICE — GLOVE BIOGEL PI INDICATOR SZ 7.5 SURGICAL PF LF -(50/BX 4BX/CA)

## (undated) DEVICE — GOWN WARMING STANDARD FLEX - (30/CA)

## (undated) DEVICE — CANNULA W/SEAL12X100ZTHREAD - (12/BX)

## (undated) DEVICE — TROCAR 5X100 NON BLADED Z-TH - READ KII (6/BX)

## (undated) DEVICE — ELECTRODE DUAL RETURN W/ CORD - (50/PK)

## (undated) DEVICE — SUTURE GENERAL

## (undated) DEVICE — CANNULA W/SEAL 5X100 Z-THRE - ADED KII (12/BX)

## (undated) DEVICE — SUCTION INSTRUMENT YANKAUER BULBOUS TIP W/O VENT (50EA/CA)

## (undated) DEVICE — CANISTER SUCTION 3000ML MECHANICAL FILTER AUTO SHUTOFF MEDI-VAC NONSTERILE LF DISP (40EA/CA)

## (undated) DEVICE — STAPLE 45MM BLUE 3.5MM ECHELON (12EA/BX)

## (undated) DEVICE — NEEDLE INSFL 120MM 14GA VRRS - (20/BX)

## (undated) DEVICE — COVER LIGHT HANDLE ALC PLUS DISP (18EA/BX)

## (undated) DEVICE — STAPLE 45MM BLUE 4.5MM (12EA/BX)

## (undated) DEVICE — STAPLER 45MM ARTICULATING - ENDO (3EA/BX)

## (undated) DEVICE — TROCAR Z THREAD12MM OPTICAL - NON BLADED (6/BX)

## (undated) DEVICE — GLOVE BIOGEL SZ 7.5 SURGICAL PF LTX - (50PR/BX 4BX/CA)

## (undated) DEVICE — TUBE E-T HI-LO CUFF 6.5MM (10EA/BX)

## (undated) DEVICE — SET EXTENSION WITH 2 PORTS (48EA/CA) ***PART #2C8610 IS A SUBSTITUTE*****

## (undated) DEVICE — STAPLE 45MM VASCULAR WHITE 2.5MM (12EA/BX)

## (undated) DEVICE — LACTATED RINGERS INJ 1000 ML - (14EA/CA 60CA/PF)

## (undated) DEVICE — SENSOR OXIMETER ADULT SPO2 RD SET (20EA/BX)

## (undated) DEVICE — PACK LAP CHOLE OR - (2EA/CA)

## (undated) DEVICE — PAD GROUNDING BOVIE PEDS - (25/CA)

## (undated) DEVICE — SUTURE 0 VICRYL PLUS UR-6 - 27 INCH (36/BX)

## (undated) DEVICE — SODIUM CHL IRRIGATION 0.9% 1000ML (12EA/CA)

## (undated) DEVICE — TUBING CLEARLINK DUO-VENT - C-FLO (48EA/CA)

## (undated) DEVICE — SUTURE 4-0 MONOCRYL PLUS PS-2 - 27 INCH (36/BX)

## (undated) DEVICE — BAG RETRIEVAL 10ML (10EA/BX)

## (undated) DEVICE — DERMABOND ADVANCED - (12EA/BX)

## (undated) DEVICE — SET SUCTION/IRRIGATION WITH DISPOSABLE TIP (6/CA )PART #0250-070-520 IS A SUB

## (undated) DEVICE — SET LEADWIRE 5 LEAD BEDSIDE DISPOSABLE ECG (1SET OF 5/EA)